# Patient Record
Sex: FEMALE | Race: WHITE | NOT HISPANIC OR LATINO | Employment: OTHER | URBAN - METROPOLITAN AREA
[De-identification: names, ages, dates, MRNs, and addresses within clinical notes are randomized per-mention and may not be internally consistent; named-entity substitution may affect disease eponyms.]

---

## 2017-02-27 RX ORDER — SERTRALINE HYDROCHLORIDE 100 MG/1
TABLET, FILM COATED ORAL
Qty: 180 TABLET | Refills: 0 | Status: SHIPPED | OUTPATIENT
Start: 2017-02-27 | End: 2017-06-03 | Stop reason: SDUPTHER

## 2017-02-27 NOTE — TELEPHONE ENCOUNTER
----- Message from Samantha Aldo sent at 2/27/2017  2:26 PM CST -----  Contact: patient  Patient calling in regards to requesting a refill for Sertraline (Zoloft). She is completely out.  Call back . Please advise.  Thanks!  [address] [place] - [address]  [address]  [place] [address]  Phone: [phone] Fax: [phone]

## 2017-03-31 DIAGNOSIS — Z12.39 BREAST CANCER SCREENING: Primary | ICD-10-CM

## 2017-06-06 RX ORDER — SERTRALINE HYDROCHLORIDE 100 MG/1
TABLET, FILM COATED ORAL
Qty: 180 TABLET | Refills: 0 | Status: SHIPPED | OUTPATIENT
Start: 2017-06-06 | End: 2017-09-08 | Stop reason: SDUPTHER

## 2017-06-07 RX ORDER — SERTRALINE HYDROCHLORIDE 100 MG/1
TABLET, FILM COATED ORAL
Qty: 180 TABLET | Refills: 0 | OUTPATIENT
Start: 2017-06-07

## 2017-09-08 RX ORDER — SERTRALINE HYDROCHLORIDE 100 MG/1
TABLET, FILM COATED ORAL
Qty: 120 TABLET | Refills: 0 | Status: SHIPPED | OUTPATIENT
Start: 2017-09-08 | End: 2017-10-03 | Stop reason: SDUPTHER

## 2017-09-08 NOTE — TELEPHONE ENCOUNTER
I have refilled Rx x 2 months. She needs to f/u with  upon her rtc, as it has been > 1 year since LOV.

## 2017-09-08 NOTE — TELEPHONE ENCOUNTER
----- Message from Chantal Ring sent at 9/8/2017 11:35 AM CDT -----  Contact: self  Patient needs a refill on Sertraline 100 mg 180 pills 90 day supply called into [facility] pharmacy at [phone].  Please call patient at [phone] if you have any questions. Thanks!     [address] [place] - [address] [facility] [address]  [address] [facility]  [place] [address]  Phone: [phone] Fax: [phone]

## 2017-09-08 NOTE — TELEPHONE ENCOUNTER
Spoke w/ pt. Verified pharmacy and allergies. Please review!! Thank you!    Last seen on 8/26/2016.    Last lab set done on 8/19/2016.    Last refill was 6/6/2017 w/ 180 tabs no refills by dr. Adam.    Pt was informed that she may need an appointment prior to refill and she stated that she was fine and did not feel that she needed to be seen by if she does she only wants to be seen my dr. Adam upon return.

## 2017-09-19 ENCOUNTER — PATIENT OUTREACH (OUTPATIENT)
Dept: ADMINISTRATIVE | Facility: HOSPITAL | Age: 72
End: 2017-09-19

## 2017-09-19 NOTE — LETTER
September 19, 2017    Zara Ann  [address] [place] [address]             [facility]  [address]  [facility] [address]  Phone: [phone] Dear Mrs. Ann:    [facility] is committed to your overall health.  To help you get the most out of each of your visits, we will review your information to make sure you are up to date on all of your recommended tests and/or procedures.      Magdalena Shrestha [facility] has found that you may be due for a mammogram and a flu immunization.     Medicare does not cover all immunizations to be given in the clinic.  Check your benefits to ensure that you do not need to receive your immunizations at the pharmacy.    If you have had any of the above done at another facility, please bring the records or information with you so that your record at [facility] will be complete.  If you would like to schedule any of these, please contact me.    If you are currently taking medication, please bring it with you to your appointment for review.    Also, if you have any type of Advanced Directives, please bring them with you to your office visit so we may scan them into your chart.    If you have any questions or concerns, please don't hesitate to call.    Thank you for letting us care for you,  Danae Fry LPN Clinical Care Coordinator  [facility]  [phone]

## 2017-10-03 ENCOUNTER — OFFICE VISIT (OUTPATIENT)
Dept: FAMILY MEDICINE | Facility: CLINIC | Age: 72
End: 2017-10-03
Payer: MEDICARE

## 2017-10-03 VITALS
SYSTOLIC BLOOD PRESSURE: 130 MMHG | HEART RATE: 69 BPM | WEIGHT: 155.13 LBS | OXYGEN SATURATION: 98 % | BODY MASS INDEX: 28.55 KG/M2 | DIASTOLIC BLOOD PRESSURE: 75 MMHG | TEMPERATURE: 98 F | HEIGHT: 62 IN

## 2017-10-03 DIAGNOSIS — K51.00 ULCERATIVE PANCOLITIS WITHOUT COMPLICATION: ICD-10-CM

## 2017-10-03 DIAGNOSIS — F33.40 RECURRENT MAJOR DEPRESSIVE DISORDER, IN REMISSION: ICD-10-CM

## 2017-10-03 DIAGNOSIS — R09.89 CAROTID BRUIT, UNSPECIFIED LATERALITY: ICD-10-CM

## 2017-10-03 DIAGNOSIS — Z00.00 GENERAL MEDICAL EXAM: Primary | ICD-10-CM

## 2017-10-03 DIAGNOSIS — J45.20 MILD INTERMITTENT REACTIVE AIRWAY DISEASE WITHOUT COMPLICATION: ICD-10-CM

## 2017-10-03 DIAGNOSIS — K76.0 FATTY LIVER: ICD-10-CM

## 2017-10-03 DIAGNOSIS — M85.80 OSTEOPENIA, UNSPECIFIED LOCATION: ICD-10-CM

## 2017-10-03 PROCEDURE — 99499 UNLISTED E&M SERVICE: CPT | Mod: S$GLB,,, | Performed by: NURSE PRACTITIONER

## 2017-10-03 PROCEDURE — 99397 PER PM REEVAL EST PAT 65+ YR: CPT | Mod: S$GLB,,, | Performed by: NURSE PRACTITIONER

## 2017-10-03 PROCEDURE — 99999 PR PBB SHADOW E&M-EST. PATIENT-LVL V: CPT | Mod: PBBFAC,,, | Performed by: NURSE PRACTITIONER

## 2017-10-03 RX ORDER — SERTRALINE HYDROCHLORIDE 100 MG/1
TABLET, FILM COATED ORAL
Qty: 120 TABLET | Refills: 0 | Status: SHIPPED | OUTPATIENT
Start: 2017-10-03 | End: 2017-12-17 | Stop reason: SDUPTHER

## 2017-10-03 RX ORDER — NAPROXEN 500 MG/1
TABLET ORAL
COMMUNITY
Start: 2017-08-19 | End: 2018-06-13

## 2017-10-03 NOTE — PROGRESS NOTES
Subjective:       Patient ID: Zara Ann is a 72 y.o. female.    Chief Complaint: Annual Exam    HPI     Patient presents to clinic for an annual exam.   Reports that she slipped and fell while visiting [place]. R leg pain has improved. Xrays were negative.   Recurrent depression - maintained on Zoloft for several years with relief of sx. Colonoscopy was last update in 2016 with Dr. Dunham. She will be due to repeat in 2 years.   She is not interested in a mammogram. She would prefer to obtain a flu shot at her local pharmacy.     Review of Systems   Constitutional: Negative for chills and fever.   Respiratory: Negative for cough, shortness of breath and wheezing.    Cardiovascular: Negative for chest pain, palpitations and leg swelling.   Gastrointestinal: Negative for constipation, diarrhea, nausea and vomiting.   Genitourinary: Negative for difficulty urinating, dysuria, frequency, hematuria and urgency.   Skin: Negative for rash and wound.   Neurological: Positive for headaches (occasional ). Negative for dizziness and light-headedness.   Psychiatric/Behavioral: Positive for dysphoric mood (relates to marital stress and recent death of her sister. ) and sleep disturbance. The patient is not nervous/anxious.        Objective:      Physical Exam   Constitutional: She is oriented to person, place, and time. She appears well-developed and well-nourished.   HENT:   Head: Normocephalic and atraumatic.   Right Ear: External ear normal.   Left Ear: External ear normal.   Nose: Nose normal.   Mouth/Throat: Oropharynx is clear and moist.   Eyes: Pupils are equal, round, and reactive to light. Right eye exhibits no discharge. Left eye exhibits no discharge.   Neck: Normal range of motion. Neck supple. Carotid bruit is present. No thyromegaly present.   Cardiovascular: Normal rate, regular rhythm, normal heart sounds and intact distal pulses.    No murmur heard.  Pulmonary/Chest: Effort normal and breath sounds  normal. No respiratory distress. She has no wheezes. She has no rales.   Abdominal: Soft. Bowel sounds are normal. She exhibits no distension and no mass. There is no tenderness. There is no guarding.   Musculoskeletal: Normal range of motion. She exhibits no edema, tenderness or deformity.   Lymphadenopathy:     She has no cervical adenopathy.   Neurological: She is alert and oriented to person, place, and time. No cranial nerve deficit.   Skin: Skin is warm and dry. Capillary refill takes less than 2 seconds.   Psychiatric: She has a normal mood and affect. Her behavior is normal.   Nursing note and vitals reviewed.      Assessment:       1. General medical exam    2. Recurrent major depressive disorder, in remission    3. Ulcerative pancolitis without complication    4. Mild intermittent reactive airway disease without complication    5. Osteopenia, unspecified location    6. Fatty liver    7. Carotid bruit, unspecified laterality        Plan:   Zara was seen today for annual exam.    Diagnoses and all orders for this visit:    General medical exam  -     Comprehensive metabolic panel; Future  -     Lipid panel; Future  -     CBC auto differential; Future  -     TSH; Future  -     Hemoglobin A1c; Future  -     Vitamin D; Future  Update labs. Anticipatory guidance reviewed.   Update colonoscopy 2018.    Recurrent major depressive disorder, in remission  -     sertraline (ZOLOFT) 100 MG tablet; TAKE ONE TABLET BY MOUTH TWICE DAILY  Side effects and precautions of medication use reviewed with patient, expressed understanding. No questions or concerns.  Self-care, sx monitoring, and counseling reviewed. Patient receptive to discussion.   Will place referral for therapy due to recent losses.     Ulcerative pancolitis without complication  Stable. Repeat colonoscopy in 2018.    Mild intermittent reactive airway disease without complication  Stable. Continue current regimen.     Osteopenia, unspecified  location  Stable. Continue current regimen.     Fatty liver  Stable. Continue current regimen.     Carotid bruit, unspecified laterality  -     US Carotid Bilateral; Future    .

## 2017-10-12 ENCOUNTER — HOSPITAL ENCOUNTER (OUTPATIENT)
Dept: RADIOLOGY | Facility: HOSPITAL | Age: 72
Discharge: HOME | End: 2017-10-12
Attending: NURSE PRACTITIONER
Payer: MEDICARE

## 2017-10-12 DIAGNOSIS — R09.89 CAROTID BRUIT, UNSPECIFIED LATERALITY: ICD-10-CM

## 2017-10-12 PROCEDURE — 93880 EXTRACRANIAL BILAT STUDY: CPT | Mod: 26,,, | Performed by: RADIOLOGY

## 2017-10-12 PROCEDURE — 93880 EXTRACRANIAL BILAT STUDY: CPT | Mod: TC,PO

## 2017-10-19 ENCOUNTER — TELEPHONE (OUTPATIENT)
Dept: FAMILY MEDICINE | Facility: CLINIC | Age: 72
End: 2017-10-19

## 2017-10-19 NOTE — TELEPHONE ENCOUNTER
----- Message from Mohsen Merlos sent at 10/19/2017  9:17 AM CDT -----  Contact: pt   Pt is calling to see if she can get her Lab Orders faxed over to [facility] in [place]  Call Back#[phone]   Thanks

## 2017-10-27 LAB
1,25(OH)2D SERPL-MCNC: 33 PG/ML (ref 18–72)
1,25(OH)2D2 SERPL-MCNC: <8 PG/ML
1,25(OH)2D3 SERPL-MCNC: 33 PG/ML
ALBUMIN SERPL-MCNC: 4.7 G/DL (ref 3.6–5.1)
ALBUMIN/GLOB SERPL: 1.7 (CALC) (ref 1–2.5)
ALP SERPL-CCNC: 72 U/L (ref 33–130)
ALT SERPL-CCNC: 17 U/L (ref 6–29)
AST SERPL-CCNC: 24 U/L (ref 10–35)
BASOPHILS # BLD AUTO: 18 CELLS/UL (ref 0–200)
BASOPHILS NFR BLD AUTO: 0.4 %
BILIRUB SERPL-MCNC: 0.8 MG/DL (ref 0.2–1.2)
BUN SERPL-MCNC: 18 MG/DL (ref 7–25)
BUN/CREAT SERPL: NORMAL (CALC) (ref 6–22)
CALCIUM SERPL-MCNC: 9.7 MG/DL (ref 8.6–10.4)
CHLORIDE SERPL-SCNC: 104 MMOL/L (ref 98–110)
CHOLEST SERPL-MCNC: 211 MG/DL
CHOLEST/HDLC SERPL: 3.8 (CALC)
CO2 SERPL-SCNC: 30 MMOL/L (ref 20–31)
COPY(IES) SENT TO:: NORMAL
CREAT SERPL-MCNC: 0.83 MG/DL (ref 0.6–0.93)
EOSINOPHIL # BLD AUTO: 149 CELLS/UL (ref 15–500)
EOSINOPHIL NFR BLD AUTO: 3.3 %
ERYTHROCYTE [DISTWIDTH] IN BLOOD BY AUTOMATED COUNT: 13.4 % (ref 11–15)
GFR SERPL CREATININE-BSD FRML MDRD: 71 ML/MIN/1.73M2
GLOBULIN SER CALC-MCNC: 2.8 G/DL (CALC) (ref 1.9–3.7)
GLUCOSE SERPL-MCNC: 97 MG/DL (ref 65–99)
HBA1C MFR BLD: 4.5 % OF TOTAL HGB
HCT VFR BLD AUTO: 39.1 % (ref 35–45)
HDLC SERPL-MCNC: 56 MG/DL
HGB BLD-MCNC: 13.4 G/DL (ref 11.7–15.5)
LDLC SERPL CALC-MCNC: 135 MG/DL (CALC)
LYMPHOCYTES # BLD AUTO: 842 CELLS/UL (ref 850–3900)
LYMPHOCYTES NFR BLD AUTO: 18.7 %
MCH RBC QN AUTO: 30.9 PG (ref 27–33)
MCHC RBC AUTO-ENTMCNC: 34.3 G/DL (ref 32–36)
MCV RBC AUTO: 90.3 FL (ref 80–100)
MONOCYTES # BLD AUTO: 405 CELLS/UL (ref 200–950)
MONOCYTES NFR BLD AUTO: 9 %
NEUTROPHILS # BLD AUTO: 3087 CELLS/UL (ref 1500–7800)
NEUTROPHILS NFR BLD AUTO: 68.6 %
NONHDLC SERPL-MCNC: 155 MG/DL (CALC)
PLATELET # BLD AUTO: 154 THOUSAND/UL (ref 140–400)
PMV BLD REES-ECKER: 9.2 FL (ref 7.5–12.5)
POTASSIUM SERPL-SCNC: 4.4 MMOL/L (ref 3.5–5.3)
PROT SERPL-MCNC: 7.5 G/DL (ref 6.1–8.1)
RBC # BLD AUTO: 4.33 MILLION/UL (ref 3.8–5.1)
SODIUM SERPL-SCNC: 143 MMOL/L (ref 135–146)
TRIGL SERPL-MCNC: 96 MG/DL
TSH SERPL-ACNC: 2.04 MIU/L (ref 0.4–4.5)
WBC # BLD AUTO: 4.5 THOUSAND/UL (ref 3.8–10.8)

## 2017-11-21 ENCOUNTER — TELEPHONE (OUTPATIENT)
Dept: FAMILY MEDICINE | Facility: CLINIC | Age: 72
End: 2017-11-21

## 2017-11-21 NOTE — TELEPHONE ENCOUNTER
----- Message from Wilfrido Poe sent at 11/21/2017 10:38 AM CST -----  Contact: patient  Patient called requesting labs be faxed to Dr. Angel [phone]. If any questions call back at [phone]. Thanks,

## 2017-12-17 DIAGNOSIS — F33.40 RECURRENT MAJOR DEPRESSIVE DISORDER, IN REMISSION: ICD-10-CM

## 2017-12-18 RX ORDER — SERTRALINE HYDROCHLORIDE 100 MG/1
TABLET, FILM COATED ORAL
Qty: 120 TABLET | Refills: 0 | Status: SHIPPED | OUTPATIENT
Start: 2017-12-18 | End: 2018-06-13 | Stop reason: SDUPTHER

## 2018-01-26 ENCOUNTER — TELEPHONE (OUTPATIENT)
Dept: ADMINISTRATIVE | Facility: HOSPITAL | Age: 73
End: 2018-01-26

## 2018-03-20 RX ORDER — SERTRALINE HYDROCHLORIDE 100 MG/1
TABLET, FILM COATED ORAL
Qty: 180 TABLET | Refills: 1 | Status: SHIPPED | OUTPATIENT
Start: 2018-03-20 | End: 2018-09-25 | Stop reason: SDUPTHER

## 2018-05-24 ENCOUNTER — TELEPHONE (OUTPATIENT)
Dept: FAMILY MEDICINE | Facility: CLINIC | Age: 73
End: 2018-05-24

## 2018-05-24 DIAGNOSIS — R30.0 DYSURIA: Primary | ICD-10-CM

## 2018-05-24 NOTE — TELEPHONE ENCOUNTER
----- Message from Thomas Fry sent at 5/24/2018 10:24 AM CDT -----  Contact: Patient  Type: Needs Medical Advice    Who Called:  Zara  Symptoms (please be specific):  UTI  How long has patient had these symptoms:  About four days now  Pharmacy name and phone #:  [facility] Phone: [phone] Fax: [phone]  Best Call Back Number: [phone]  Additional Information:  Would like to know if she can just come in for a urine sample, instead of getting an appointment.

## 2018-05-24 NOTE — TELEPHONE ENCOUNTER
Urine orders done. Please ask her to be seen in UC or [facility] for full eval as well given bleeding and fever.

## 2018-05-24 NOTE — TELEPHONE ENCOUNTER
----- Message from Kiki Blount sent at 5/24/2018  4:13 PM CDT -----  Contact: Pt  Type:  Patient Returning Call    Who Called: Joanne  Who Left Message for Patient: Racheal  Does the patient know what this is regarding?: results for UTI  Best Call Back Number: .[phone] (home)  Additional Information:

## 2018-05-24 NOTE — TELEPHONE ENCOUNTER
"Pt c/o dysuria x 4-5 days, she states she has seen blood after wiping on the tissue in the beginning but no longer. She is taking OTC cranberry pills and drinking cranberry juice.  She c/o low grade fever, no chills but states she "just does not feel good".  Pt dropped off urine sample to  and is requesting urinalysis.  Please advise.      "

## 2018-05-24 NOTE — TELEPHONE ENCOUNTER
----- Message from Juan Riley sent at 5/24/2018  3:54 PM CDT -----  Contact: same  Patient called in and stated she dropped off a urine sample this morning and wanted to see if results were in and when the Rx would be called in for her??      [address] [place] - [address]  [address]  [place] [address]  Phone: [phone] Fax: [phone]    Patient call back number is [phone] or cell number is [phone]

## 2018-05-25 ENCOUNTER — LAB VISIT (OUTPATIENT)
Dept: LAB | Facility: HOSPITAL | Age: 73
End: 2018-05-25
Attending: FAMILY MEDICINE
Payer: MEDICARE

## 2018-05-25 DIAGNOSIS — R30.0 DYSURIA: ICD-10-CM

## 2018-05-25 LAB
BACTERIA #/AREA URNS AUTO: ABNORMAL /HPF
BILIRUB UR QL STRIP: NEGATIVE
CAOX CRY UR QL COMP ASSIST: ABNORMAL
CLARITY UR REFRACT.AUTO: ABNORMAL
COLOR UR AUTO: YELLOW
GLUCOSE UR QL STRIP: NEGATIVE
HGB UR QL STRIP: NEGATIVE
KETONES UR QL STRIP: NEGATIVE
LEUKOCYTE ESTERASE UR QL STRIP: ABNORMAL
MICROSCOPIC COMMENT: ABNORMAL
NITRITE UR QL STRIP: NEGATIVE
PH UR STRIP: 7 [PH] (ref 5–8)
PROT UR QL STRIP: NEGATIVE
RBC #/AREA URNS AUTO: 3 /HPF (ref 0–4)
SP GR UR STRIP: 1.02 (ref 1–1.03)
URN SPEC COLLECT METH UR: ABNORMAL
UROBILINOGEN UR STRIP-ACNC: NEGATIVE EU/DL
WBC #/AREA URNS AUTO: >100 /HPF (ref 0–5)
WBC CLUMPS UR QL AUTO: ABNORMAL

## 2018-05-25 PROCEDURE — 87088 URINE BACTERIA CULTURE: CPT

## 2018-05-25 PROCEDURE — 87086 URINE CULTURE/COLONY COUNT: CPT

## 2018-05-25 PROCEDURE — 81001 URINALYSIS AUTO W/SCOPE: CPT

## 2018-05-25 RX ORDER — CIPROFLOXACIN 500 MG/1
500 TABLET ORAL 2 TIMES DAILY
Qty: 10 TABLET | Refills: 0 | Status: SHIPPED | OUTPATIENT
Start: 2018-05-25 | End: 2018-06-13

## 2018-05-25 RX ORDER — MESALAMINE 800 MG/1
800 TABLET, DELAYED RELEASE ORAL 2 TIMES DAILY
COMMUNITY
End: 2020-07-22

## 2018-05-25 NOTE — TELEPHONE ENCOUNTER
ua suggestive of UTI. cx will take 72hrs. Given her extensive allergy list, we can start her on cipro over the weekend in tx. If cx indicates cipro will be ineffective, we'll call her to discuss.  Increase water intake. Needs to be seen over the weekend if worsening or febrile.

## 2018-05-27 LAB — BACTERIA UR CULT: NORMAL

## 2018-05-28 ENCOUNTER — TELEPHONE (OUTPATIENT)
Dept: FAMILY MEDICINE | Facility: CLINIC | Age: 73
End: 2018-05-28

## 2018-05-28 RX ORDER — CLINDAMYCIN HYDROCHLORIDE 150 MG/1
150 CAPSULE ORAL 3 TIMES DAILY
Qty: 21 CAPSULE | Refills: 0 | Status: SHIPPED | OUTPATIENT
Start: 2018-05-28 | End: 2018-06-13 | Stop reason: ALTCHOICE

## 2018-05-28 NOTE — TELEPHONE ENCOUNTER
She was started on Cipro on Friday. Find out how her symptoms are doing. If not improved, we will change there antibiotics

## 2018-05-28 NOTE — TELEPHONE ENCOUNTER
----- Message from Claudette Agarwal sent at 5/28/2018 11:56 AM CDT -----  Type:  Test Results    Who Called:  patient  Name of Test (Lab/Mammo/Etc): urine  Date of Test:  5/25/18  Ordering Provider:  Elder  Where the test was performed:  [facility]  Best Call Back Number:  [phone] (home)     Additional Information:  Na

## 2018-05-28 NOTE — TELEPHONE ENCOUNTER
Pt states that burning during urination is gone. However pt states she is weak and has pretty much sleeping the last 3 days. Pt would like advise on how to proceed.    Review of patient's allergies indicates:  Allergen Reactions   Macrobid [nitrofurantoin monohyd/m-cryst] Shortness Of Breath   Pcn [penicillins] Hives   Sulfa (sulfonamide antibiotics) Hives   Cephalosporins    Hydrocod-cpm-pe-acetaminophen    Hydrocortisone Nausea Only      Pt wanted provider to be aware of allergies again.

## 2018-06-11 ENCOUNTER — TELEPHONE (OUTPATIENT)
Dept: FAMILY MEDICINE | Facility: CLINIC | Age: 73
End: 2018-06-11

## 2018-06-11 NOTE — TELEPHONE ENCOUNTER
----- Message from Shani Nava sent at 6/11/2018 11:21 AM CDT -----  Type:  Sooner Apoointment Request    Caller is requesting a sooner appointment.  Caller declined first available appointment listed below.  Caller will not accept being placed on the waitlist and is requesting a message be sent to doctor.    Name of Caller:  Patient  When is the first available appointment?  7/19/18  Symptoms:  Fingers on both hands feeling numb, pins and needles  Best Call Back Number:  [phone]  Additional Information:  Patient is scared and would like to come in today. Please call back to advice.

## 2018-06-11 NOTE — TELEPHONE ENCOUNTER
Pt states the fingers in both hand went numb at 5 am this morning. Pt states its only her fingers. Pt was advised to go to  pt declined. Offered pt 1st opening on 6-, pt was wondering if she needed to be seen sooner. Pt wants a message sent to provider.

## 2018-06-13 ENCOUNTER — HOSPITAL ENCOUNTER (OUTPATIENT)
Dept: RADIOLOGY | Facility: HOSPITAL | Age: 73
Discharge: HOME | End: 2018-06-13
Attending: FAMILY MEDICINE
Payer: MEDICARE

## 2018-06-13 ENCOUNTER — OFFICE VISIT (OUTPATIENT)
Dept: FAMILY MEDICINE | Facility: CLINIC | Age: 73
End: 2018-06-13
Payer: MEDICARE

## 2018-06-13 VITALS
TEMPERATURE: 98 F | SYSTOLIC BLOOD PRESSURE: 100 MMHG | OXYGEN SATURATION: 97 % | DIASTOLIC BLOOD PRESSURE: 70 MMHG | HEIGHT: 62 IN | WEIGHT: 157.31 LBS | BODY MASS INDEX: 28.95 KG/M2 | HEART RATE: 76 BPM

## 2018-06-13 DIAGNOSIS — G62.9 NEUROPATHY: ICD-10-CM

## 2018-06-13 DIAGNOSIS — F33.40 RECURRENT MAJOR DEPRESSIVE DISORDER, IN REMISSION: ICD-10-CM

## 2018-06-13 DIAGNOSIS — Z12.39 SCREENING BREAST EXAMINATION: ICD-10-CM

## 2018-06-13 DIAGNOSIS — Z78.0 POSTMENOPAUSAL STATE: ICD-10-CM

## 2018-06-13 DIAGNOSIS — K76.0 FATTY LIVER: ICD-10-CM

## 2018-06-13 DIAGNOSIS — R73.9 HYPERGLYCEMIA: ICD-10-CM

## 2018-06-13 DIAGNOSIS — R20.0 NUMBNESS IN BOTH HANDS: ICD-10-CM

## 2018-06-13 DIAGNOSIS — M54.2 NECK PAIN: ICD-10-CM

## 2018-06-13 DIAGNOSIS — F33.1 MODERATE EPISODE OF RECURRENT MAJOR DEPRESSIVE DISORDER: ICD-10-CM

## 2018-06-13 DIAGNOSIS — D48.9 NEOPLASM, UNCERTAIN WHETHER BENIGN OR MALIGNANT: Primary | ICD-10-CM

## 2018-06-13 DIAGNOSIS — M10.9 GOUT, UNSPECIFIED CAUSE, UNSPECIFIED CHRONICITY, UNSPECIFIED SITE: ICD-10-CM

## 2018-06-13 PROCEDURE — 99214 OFFICE O/P EST MOD 30 MIN: CPT | Mod: S$GLB,,, | Performed by: FAMILY MEDICINE

## 2018-06-13 PROCEDURE — 72040 X-RAY EXAM NECK SPINE 2-3 VW: CPT | Mod: TC,PN

## 2018-06-13 PROCEDURE — 72040 X-RAY EXAM NECK SPINE 2-3 VW: CPT | Mod: 26,,, | Performed by: RADIOLOGY

## 2018-06-13 PROCEDURE — 77067 SCR MAMMO BI INCL CAD: CPT | Mod: TC,PO

## 2018-06-13 PROCEDURE — 77080 DXA BONE DENSITY AXIAL: CPT | Mod: 26,,, | Performed by: RADIOLOGY

## 2018-06-13 PROCEDURE — 99499 UNLISTED E&M SERVICE: CPT | Mod: S$GLB,,, | Performed by: FAMILY MEDICINE

## 2018-06-13 PROCEDURE — 99999 PR PBB SHADOW E&M-EST. PATIENT-LVL V: CPT | Mod: PBBFAC,,, | Performed by: FAMILY MEDICINE

## 2018-06-13 PROCEDURE — 77080 DXA BONE DENSITY AXIAL: CPT | Mod: TC,PO

## 2018-06-13 PROCEDURE — 77067 SCR MAMMO BI INCL CAD: CPT | Mod: 26,,, | Performed by: RADIOLOGY

## 2018-06-13 PROCEDURE — 77063 BREAST TOMOSYNTHESIS BI: CPT | Mod: 26,,, | Performed by: RADIOLOGY

## 2018-06-13 RX ORDER — GABAPENTIN 300 MG/1
300 CAPSULE ORAL NIGHTLY
Qty: 30 CAPSULE | Refills: 1 | Status: SHIPPED | OUTPATIENT
Start: 2018-06-13 | End: 2019-02-11 | Stop reason: SDUPTHER

## 2018-06-13 RX ORDER — ASPIRIN 81 MG/1
81 TABLET ORAL NIGHTLY
Status: ON HOLD | COMMUNITY
End: 2018-10-24 | Stop reason: SDUPTHER

## 2018-06-13 NOTE — PROGRESS NOTES
Subjective:       Patient ID: Zara Ann is a 73 y.o. female.    Chief Complaint: Numbness (both hands)    HPI  Patient in the office to review numbness in both hands.  Recalls onset 3 days ago in the early morning. R hand in particular was completely numb for about a day. Improved over the course of the next 24hrs starting with pins and needles. It recurred the next morning again with numbness. This morning, to note, her hands were not numb. No weakness. She was still able to hold a pen and write.    Incidentally, had completed course of cipro prior to onset.    Also, notes 20 years ago, saw neurology for difficulty raising her arms above her head. States that imaging showed cervical spine issues and did exercises thru [facility] to resolve/improve. She does not recall any prior dx of neuropathy.    Also, a mole on her L arm that is new, increasing in size.  Also, soles of feet are numb/change in sensation x over 1 year.     Bladder sx are resolved. Reports dyspnea while on cipro and clinda which has since resolved.    Review of Systems   Constitutional: Negative for chills, fever and unexpected weight change.   HENT: Negative for congestion and postnasal drip.    Respiratory: Negative for cough and shortness of breath.    Cardiovascular: Negative for chest pain, palpitations and leg swelling.   Gastrointestinal: Negative for constipation, diarrhea and nausea.   Genitourinary: Negative for difficulty urinating, dysuria and hematuria.        Uti resolved   Musculoskeletal: Negative for back pain, gait problem and joint swelling.   Skin: Negative for color change and rash.   Neurological: Positive for numbness. Negative for dizziness, facial asymmetry, speech difficulty, weakness and headaches.   Psychiatric/Behavioral: Negative for sleep disturbance. The patient is not nervous/anxious.        Objective:      Physical Exam   Constitutional: She is oriented to person, place, and time. She appears well-developed  and well-nourished. No distress.   HENT:   Head: Normocephalic and atraumatic.   Eyes: Conjunctivae are normal. Right eye exhibits no discharge. Left eye exhibits no discharge. No scleral icterus.   Neck: Normal range of motion. Neck supple.   Cardiovascular: Normal rate and regular rhythm.    Pulmonary/Chest: Effort normal and breath sounds normal. No respiratory distress.   Abdominal: Soft. She exhibits no distension.   Musculoskeletal: Normal range of motion. She exhibits no edema.   Tinel, phalens neg.   Neurological: She is alert and oriented to person, place, and time. She has normal strength. A sensory deficit (great toe, bilateral; hands intact) is present. No cranial nerve deficit.   Skin: Skin is warm and dry. No rash noted.        Psychiatric: Her behavior is normal. She exhibits a depressed mood (having marital issues).   occ scattered thought process   Nursing note and vitals reviewed.        Neoplasm, uncertain whether benign or malignant  -     Ambulatory referral to Dermatology  For review with derm.  Neck pain  -     X-Ray Cervical Spine AP And Lateral; Future; Expected date: 06/13/2018  -     MRI Cervical Spine Without Contrast; Future; Expected date: 06/13/2018  -     Ambulatory referral to Neurology  -     gabapentin (NEURONTIN) 300 MG capsule; Take 1 capsule (300 mg total) by mouth every evening.  Dispense: 30 capsule; Refill: 1  CTS vs cervical stenosis. Update imaging and f/u in neuro. Can trial gabapentin for sx relief.   Numbness in both hands  As above.    Neuropathy  Pedal neuropathic change. Will need to consider emgs for assessment in neuro. A1c last checked 10/2017 and wnl.  Recurrent major depressive disorder, in remission  Uncontrolled of late due to personal stressors. On max dose of zoloft. Relatively med avoidant. Recommend counseling, which pt agrees with.  Fatty liver  Noted previously, will monitor.  Gout, unspecified cause, unspecified chronicity, unspecified site  Update uric  acid level.  Hyperglycemia  -     Hemoglobin A1c; Future; Expected date: 06/13/2018  Update A1c for monitoring. States nonfasting BS at home in excess of 170 on 's glucometer.  Moderate episode of recurrent major depressive disorder  -     Ambulatory referral to Psychology  As above.

## 2018-06-26 ENCOUNTER — HOSPITAL ENCOUNTER (OUTPATIENT)
Dept: RADIOLOGY | Facility: HOSPITAL | Age: 73
Discharge: HOME | End: 2018-06-26
Attending: FAMILY MEDICINE
Payer: MEDICARE

## 2018-06-26 DIAGNOSIS — R20.0 NUMBNESS IN BOTH HANDS: ICD-10-CM

## 2018-06-26 DIAGNOSIS — M54.2 NECK PAIN: ICD-10-CM

## 2018-06-26 DIAGNOSIS — G62.9 NEUROPATHY: ICD-10-CM

## 2018-06-26 PROCEDURE — 72141 MRI NECK SPINE W/O DYE: CPT | Mod: TC,PO

## 2018-06-26 PROCEDURE — 72141 MRI NECK SPINE W/O DYE: CPT | Mod: 26,,, | Performed by: RADIOLOGY

## 2018-06-28 ENCOUNTER — TELEPHONE (OUTPATIENT)
Dept: FAMILY MEDICINE | Facility: CLINIC | Age: 73
End: 2018-06-28

## 2018-06-28 DIAGNOSIS — M50.00 CERVICAL DISC DISEASE WITH MYELOPATHY: Primary | ICD-10-CM

## 2018-06-28 NOTE — TELEPHONE ENCOUNTER
Spoke w/ pt. Informed pt about results and recommendations per provider. pt verbalized understanding.    appt on 8-.

## 2018-06-28 NOTE — TELEPHONE ENCOUNTER
Multilevel degenerative changes, disc issues, narrowing noted on exam. Recommend she schedule an appt with Dr Gomez to discuss injections or similar that may be of sx benefit. Referral done.   A copy of her result was also sent to their office.

## 2018-06-28 NOTE — TELEPHONE ENCOUNTER
----- Message from Juan Riley sent at 6/28/2018  3:52 PM CDT -----  Contact: same  Type:  Test Results    Who Called:  patient  Name of Test (Lab/Mammo/Etc):  MRI  Date of Test:  6/26/18  Ordering Provider:  Elder  Where the test was performed:  [address] [facility] [address]  Best Call Back Number:  [phone]  Additional Information:  n/a

## 2018-06-28 NOTE — TELEPHONE ENCOUNTER
Tried to reach pt. No answer, left msg to call back.    Contacting to inform pt of results and recommendations.

## 2018-07-16 ENCOUNTER — TELEPHONE (OUTPATIENT)
Dept: FAMILY MEDICINE | Facility: CLINIC | Age: 73
End: 2018-07-16

## 2018-07-16 ENCOUNTER — TELEPHONE (OUTPATIENT)
Dept: NEUROLOGY | Facility: CLINIC | Age: 73
End: 2018-07-16

## 2018-07-16 ENCOUNTER — OFFICE VISIT (OUTPATIENT)
Dept: FAMILY MEDICINE | Facility: CLINIC | Age: 73
End: 2018-07-16
Payer: MEDICARE

## 2018-07-16 VITALS
OXYGEN SATURATION: 96 % | WEIGHT: 159 LBS | RESPIRATION RATE: 16 BRPM | BODY MASS INDEX: 29.26 KG/M2 | TEMPERATURE: 98 F | SYSTOLIC BLOOD PRESSURE: 116 MMHG | DIASTOLIC BLOOD PRESSURE: 82 MMHG | HEIGHT: 62 IN | HEART RATE: 68 BPM

## 2018-07-16 DIAGNOSIS — B02.31 HERPES ZOSTER CONJUNCTIVITIS: Primary | ICD-10-CM

## 2018-07-16 PROCEDURE — 99214 OFFICE O/P EST MOD 30 MIN: CPT | Mod: S$GLB,,, | Performed by: FAMILY MEDICINE

## 2018-07-16 PROCEDURE — 99999 PR PBB SHADOW E&M-EST. PATIENT-LVL IV: CPT | Mod: PBBFAC,,, | Performed by: FAMILY MEDICINE

## 2018-07-16 RX ORDER — PREDNISONE 20 MG/1
20 TABLET ORAL DAILY
Qty: 5 TABLET | Refills: 0 | Status: SHIPPED | OUTPATIENT
Start: 2018-07-16 | End: 2018-07-26

## 2018-07-16 RX ORDER — VALACYCLOVIR HYDROCHLORIDE 1 G/1
1000 TABLET, FILM COATED ORAL 3 TIMES DAILY
Qty: 21 TABLET | Refills: 0 | Status: SHIPPED | OUTPATIENT
Start: 2018-07-16 | End: 2018-07-25 | Stop reason: SDUPTHER

## 2018-07-16 NOTE — TELEPHONE ENCOUNTER
----- Message from Rissa Whitt sent at 7/16/2018 11:46 AM CDT -----  Contact: Aneta doss/ Gabriele doss/ Jessica calling to speak to the Nurse regarding prescription for predniSONE (DELTASONE) 20 MG tablet. They are needing to verify quantity and directions. Please advise. Call to pod. No answer.   Call back   Thanks!     [facility] [address] [place]  [address]  [address]  [place] [address]  Phone: [phone] Fax: [phone]

## 2018-07-16 NOTE — PROGRESS NOTES
Subjective:       Patient ID: Zara Ann is a 73 y.o. female.    Chief Complaint: Pain (pt c/o pain to left forehead and scalp)    HPI  Patient with c/o rash and burning pain to L forehead and scalp about 1 week ago. She notes there is now a rash on her eyelid and forehead. Afebrile. Vision on affected side is intact. Her L hearing was prev down, but she doesn't note any recent change.  Pain is localized to L forehead and scalp.    Review of Systems   Constitutional: Negative for fatigue and fever.   HENT: Negative for congestion and postnasal drip.    Respiratory: Negative for cough and shortness of breath.    Skin: Positive for rash. Negative for wound.   Neurological: Positive for headaches. Negative for light-headedness and numbness.       Objective:      Physical Exam   Constitutional: She is oriented to person, place, and time. She appears well-developed and well-nourished. No distress.   HENT:   Head: Normocephalic and atraumatic.       Eyes: Conjunctivae are normal. Right eye exhibits no discharge. Left eye exhibits no discharge. No scleral icterus.   Neck: Normal range of motion. Neck supple.   Cardiovascular: Normal rate.    Pulmonary/Chest: Effort normal. No respiratory distress.   Abdominal: Soft. She exhibits no distension.   Musculoskeletal: Normal range of motion. She exhibits no edema.   Neurological: She is alert and oriented to person, place, and time.   Skin: Skin is warm and dry. No rash noted.   Psychiatric: She has a normal mood and affect. Her behavior is normal.   Nursing note and vitals reviewed.        Herpes zoster conjunctivitis  -     valACYclovir (VALTREX) 1000 MG tablet; Take 1 tablet (1,000 mg total) by mouth 3 (three) times daily. for 7 days  Dispense: 21 tablet; Refill: 0  -     predniSONE (DELTASONE) 20 MG tablet; Take 1 tablet (20 mg total) by mouth once daily. for 10 days  Dispense: 5 tablet; Refill: 0  -     Ambulatory referral to Ophthalmology    Side effects and  precautions of medication use reviewed with patient, expressed understanding. No questions or concerns. Expected course of illness and sx tx incl otc med use reviewed. Notify MD if sx persist or worsen.   Pt has gabapentin at home, recommend qd-tid prn pain. Sedation risks reviewed.

## 2018-07-16 NOTE — TELEPHONE ENCOUNTER
Spoke to Aneta at [facility] pharmacy re: prednisone 20mg, clarify script 1 tab (20mg) once daily for 5 days per Dr Adam verbal confirmation.

## 2018-07-16 NOTE — TELEPHONE ENCOUNTER
Called and spoke with patient. Appointment rescheduled to a sooner date. Patient verbalized understanding.

## 2018-07-17 ENCOUNTER — OFFICE VISIT (OUTPATIENT)
Dept: OPTOMETRY | Facility: CLINIC | Age: 73
End: 2018-07-17
Payer: MEDICARE

## 2018-07-17 DIAGNOSIS — B02.39 HERPES ZOSTER DERMATITIS OF EYELID: Primary | ICD-10-CM

## 2018-07-17 PROCEDURE — 99999 PR PBB SHADOW E&M-EST. PATIENT-LVL III: CPT | Mod: PBBFAC,,, | Performed by: OPTOMETRIST

## 2018-07-17 PROCEDURE — 92004 COMPRE OPH EXAM NEW PT 1/>: CPT | Mod: S$GLB,,, | Performed by: OPTOMETRIST

## 2018-07-17 RX ORDER — ERYTHROMYCIN 5 MG/G
OINTMENT OPHTHALMIC
Qty: 3.5 G | Refills: 1 | Status: SHIPPED | OUTPATIENT
Start: 2018-07-17 | End: 2018-07-24

## 2018-07-17 NOTE — PROGRESS NOTES
HPI     Eye Problem    Additional comments: ref by Dr. Adam for shingles with lesions left side   x 1 week, started on valtrex x 1 day -- OS looks clear, no visual   complaints           Eye Pain    Additional comments: severe pain around OS w/ some discharge, tearing --   +acyclovir shanti            Comments   Agree above  Dx HZ left sided trigeminal -- this past week  Started anti viral yesterday  Reports not tolerating the neurontin due to sleepiness         Last edited by VASYL Hernández, OD on 7/17/2018  1:16 PM. (History)        ROS     Positive for: Eyes    Negative for: Constitutional, Gastrointestinal, Neurological, Skin,   Genitourinary, Musculoskeletal, HENT, Endocrine, Cardiovascular,   Respiratory, Psychiatric, Allergic/Imm, Heme/Lymph    Last edited by VASYL Hernández, OD on 7/17/2018  9:57 AM. (History)        Assessment /Plan     For exam results, see Encounter Report.    Herpes zoster dermatitis of eyelid  -     erythromycin (ROMYCIN) ophthalmic ointment; Apply sparingly to eyelid and lesion OS, tid x 7 days  Dispense: 3.5 g; Refill: 1      Mild eyelid involvement OS  No Gomez's sign  No cornea / globe involvement    IOP / DFE wnl, discussed s/s of ocular involvement, knows to call if any concerns  Rx EES shanti to eyelid skin / lesion, tid x 7 days or prn

## 2018-07-17 NOTE — PATIENT INSTRUCTIONS
"DRY EYES:  Use Over The Counter artificial tears as needed for dry eye symptoms.  Some common brands include:  Systane, Optive, and Refresh.  These drops can be used as frequently as desired, but may be most helpful use during long periods of concentrated work.  For example, reading / working at the computer.  Avoid drops that "get redness out", as these contain medication that may further irritate the eyes.    ALLERGY EYES / SYMPTOMS:    Over the counter medications include--Zaditor and Alaway  Use as directed 1-2 drops daily for symptoms of itching / watering eyes.  These drops will not help for dry eye or exposure symptoms.      Shingles  Shingles is a viral infection caused by the same virus as chicken pox. Anyone who has had chicken pox may get shingles later in life. The virus stays in the body, but remains dormant (asleep). Shingles often occurs in older persons or persons with lowered immunity. But it can affect anyone at any age.  Shingles starts as a tingling patch of skin on one side of the body. Small, painful blisters may then appear. The rash does not spread to the rest of the body.  Exposure to shingles cannot cause shingles. However, it can cause chicken pox in anyone who has not had chicken pox or has not been vaccinated. The contagious period ends when all blisters have crusted over (generally about 2 weeks after the illness begins).  After the blisters heal, the affected skin may be sensitive or painful for months (neuralgia). This often gradually goes away.  A shingles vaccine is available. This can help prevent shingles or make it less painful. It is generally recommended for adults over the age of 60 who have had chicken pox in the past, but who have never had shingles. Adults over 60 who have had neither chicken pox nor shingles can prevent both diseases with the chicken pox vaccine. Ask your healthcare provider about these vaccines.  Home care  · Medicines may be prescribed to help relieve " pain. Take these medicines as directed. Ask your healthcare provider or pharmacist before using over-the-counter medicines for helping treat pain and itching.  · In certain cases, antiviral medicines may be prescribed to reduce pain, shorten the illness, and prevent neuralgia. Take these medicines as directed.  · Compresses made from a solution of cool water mixed with cornstarch or baking soda may help relieve pain and itching.   · Gently wash skin daily with soap and water to help prevent infection.  Be certain to rinse off all of the soap, which can be irritating.  · Trim fingernails and try not to scratch. Scratching the sores may leave scars.  · Stay home from work or school until all blisters have formed a crust and you are no longer contagious.  Follow-up care  Follow up with your healthcare provider or as directed by our staff.  When to seek medical advice  · Fever of 100.4°F (38°C) or higher, or as directed by your healthcare provider  · Affected skin is on the face or neck and any of the following occur:  ¨ Headache  ¨ Eye pain  ¨ Changes in vision  ¨ Sores near the eye  ¨ Weakness of facial muscles  · Pain, redness, or swelling of a joint  · Signs of skin infection: colored drainage from the sores, warmth, increasing redness, or increasing pain  Date Last Reviewed: 9/25/2015  © 3330-1600 [employer]. [address] [place] [address], [place] [address]. All rights reserved. This information is not intended as a substitute for professional medical care. Always follow your healthcare professional's instructions.

## 2018-07-17 NOTE — LETTER
July 17, 2018      Ashlie Adam MD  [address]  [place] [address]           [facility]  [address] [facility] [address]  Phone: [phone]  Fax: [phone]          Patient: Zara Ann   MR Number: [id number]   YOB: 1945   Date of Visit: 7/17/2018       Dear Dr. Ashlie Adam:    Thank you for referring Zara Ann to me for evaluation. Attached you will find relevant portions of my assessment and plan of care.    HZ involving eyelid OS, no cornea/ globe manifestation. I'll f/u prn if any changes.    If you have questions, please do not hesitate to call me. I look forward to following Zara Ann along with you.    Sincerely,    VASYL Hernández, OD    Enclosure  CC:  No Recipients    If you would like to receive this communication electronically, please contact [email or web address] or [phone] to request more information on [employer] access.    For providers and/or their staff who would like to refer a patient to [facility], please contact us through our one-stop-shop provider referral line, [facility], at [phone].    If you feel you have received this communication in error or would no longer like to receive these types of communications, please e-mail [email or web address]

## 2018-07-25 DIAGNOSIS — B02.31 HERPES ZOSTER CONJUNCTIVITIS: ICD-10-CM

## 2018-07-25 NOTE — TELEPHONE ENCOUNTER
----- Message from Whitloren Alcaraz sent at 7/25/2018  4:38 PM CDT -----  Rx Valacyclouir.  Please send into [facility].  Any questions call [phone].

## 2018-07-26 RX ORDER — VALACYCLOVIR HYDROCHLORIDE 1 G/1
1000 TABLET, FILM COATED ORAL 3 TIMES DAILY
Qty: 21 TABLET | Refills: 0 | Status: SHIPPED | OUTPATIENT
Start: 2018-07-26 | End: 2018-08-22 | Stop reason: SDUPTHER

## 2018-07-26 NOTE — TELEPHONE ENCOUNTER
----- Message from Irina Leyva sent at 7/26/2018 11:54 AM CDT -----  Contact: self  Patient want to know if any other shingle medication has been prescribed for her, please call back at [phone] (home)

## 2018-07-26 NOTE — TELEPHONE ENCOUNTER
An additional course of valtrex is usually not needed unless she is having new blisters appear. How is the rash at this point?

## 2018-07-26 NOTE — TELEPHONE ENCOUNTER
Patient states that the rash on her face is gone but her hair/head hurts. Not sure if it is there or not. States that she read where you need to be on the treatment for an extended period of time. Please advise

## 2018-08-01 ENCOUNTER — TELEPHONE (OUTPATIENT)
Dept: FAMILY MEDICINE | Facility: CLINIC | Age: 73
End: 2018-08-01

## 2018-08-01 ENCOUNTER — NURSE TRIAGE (OUTPATIENT)
Dept: ADMINISTRATIVE | Facility: CLINIC | Age: 73
End: 2018-08-01

## 2018-08-01 ENCOUNTER — OFFICE VISIT (OUTPATIENT)
Dept: OPTOMETRY | Facility: CLINIC | Age: 73
End: 2018-08-01
Payer: MEDICARE

## 2018-08-01 ENCOUNTER — TELEPHONE (OUTPATIENT)
Dept: OPTOMETRY | Facility: CLINIC | Age: 73
End: 2018-08-01

## 2018-08-01 DIAGNOSIS — B02.22 POST-HERPETIC TRIGEMINAL NEURALGIA: Primary | ICD-10-CM

## 2018-08-01 PROCEDURE — 99999 PR PBB SHADOW E&M-EST. PATIENT-LVL III: CPT | Mod: PBBFAC,,, | Performed by: OPTOMETRIST

## 2018-08-01 PROCEDURE — 92012 INTRM OPH EXAM EST PATIENT: CPT | Mod: S$GLB,,, | Performed by: OPTOMETRIST

## 2018-08-01 NOTE — TELEPHONE ENCOUNTER
She is going to visit her grandchildren, her daughter in law told her that she doesn't want her to come visit because the children might catch chicken pox from her because she had shingles.  She said all lesions are gone and healed.  The children are 9 and 5. Please call her with advice.

## 2018-08-01 NOTE — TELEPHONE ENCOUNTER
----- Message from Claudy Graves sent at 8/1/2018  9:09 AM CDT -----  Contact: Pt  Type: Needs Medical Advice    Who Called:  Pt  Symptoms (please be specific):  Sharp pain in left eye after shingles went away  How long has patient had these symptoms:  Past 3 days  Pharmacy name and phone #:    [facility] [place] [facility] [id number] - [place] [address]  [address]  [place] [address]  Phone: [phone] Fax: [phone]    Best Call Back Number: [phone]   Additional Information: please call pt to advise.  Pt is leaving town tomorrow morning

## 2018-08-01 NOTE — PROGRESS NOTES
"HPI     Eye Pain    Additional comments: stabbing pains OS x 2 days, some relief when closing   eye -- +erythromycin shanti  // no redness, discharge or blurred VA            Comments   Agree above  Notes worsening pain / discomfort on Left sided and around OS  VA is "normal"           Last edited by VASYL Hernández, OD on 8/1/2018  1:21 PM. (History)        ROS     Positive for: Eyes    Negative for: Constitutional, Gastrointestinal, Neurological, Skin,   Genitourinary, Musculoskeletal, HENT, Endocrine, Cardiovascular,   Respiratory, Psychiatric, Allergic/Imm, Heme/Lymph    Last edited by VASYL Hernández, OD on 8/1/2018  1:21 PM. (History)        Assessment /Plan     For exam results, see Encounter Report.    Post-herpetic trigeminal neuralgia      No active keratitis / uveitis OS  Ocular health appears wnl   Discussed above and reassured, reviewed s/s of keratitis and uveitis related to HZ  Consider taking the gabapentin to help with discomfort. Patient concerned with sleepiness that occurs with full dosing, advised to try and take 1/3 or 1/2 capsule dose  ATs may help as well, gave otc suggestions  RTC prn                   "

## 2018-08-01 NOTE — TELEPHONE ENCOUNTER
----- Message from Candi Carrillo sent at 8/1/2018  3:57 PM CDT -----  Contact: self [phone]  She is going to visit her grandchildren, her daughter in law told her that she doesn't want her to come visit because the children might catch chicken pox from her because she had shingles.  She said all lesions are gone and healed.  The children are 9 and 5. Please call her with advice.  Thank you!

## 2018-08-02 NOTE — TELEPHONE ENCOUNTER
Called patient to discuss recommendations. She stated that someone was supposed to call her back yesterday (notably, she had called for advice at 3:57 PM), expressed anger that no one called her back yesterday (she had received a call back from LPN in this clinic at 4:37), and stated that she did not need assistance at this time.

## 2018-08-21 ENCOUNTER — OFFICE VISIT (OUTPATIENT)
Dept: PAIN MEDICINE | Facility: CLINIC | Age: 73
End: 2018-08-21
Payer: MEDICARE

## 2018-08-21 VITALS
HEART RATE: 65 BPM | OXYGEN SATURATION: 98 % | SYSTOLIC BLOOD PRESSURE: 152 MMHG | TEMPERATURE: 97 F | DIASTOLIC BLOOD PRESSURE: 69 MMHG | WEIGHT: 153.44 LBS | RESPIRATION RATE: 20 BRPM | BODY MASS INDEX: 28.24 KG/M2 | HEIGHT: 62 IN

## 2018-08-21 DIAGNOSIS — M48.02 CERVICAL STENOSIS OF SPINAL CANAL: Primary | ICD-10-CM

## 2018-08-21 PROCEDURE — 99499 UNLISTED E&M SERVICE: CPT | Mod: S$GLB,,, | Performed by: ANESTHESIOLOGY

## 2018-08-21 PROCEDURE — 99204 OFFICE O/P NEW MOD 45 MIN: CPT | Mod: S$GLB,,, | Performed by: ANESTHESIOLOGY

## 2018-08-21 PROCEDURE — 99999 PR PBB SHADOW E&M-EST. PATIENT-LVL IV: CPT | Mod: PBBFAC,,, | Performed by: ANESTHESIOLOGY

## 2018-08-21 NOTE — PROGRESS NOTES
This note was completed with dictation software and grammatical errors may exist.    CC:  Hand numbness    HPI:  The patient is a 73-year-old woman with a history of with a history of GERD, osteopenia, ulcer of colitis who presents in referral from Dr. ann for bilateral hand numbness.  Patient states that she made this appointment about 2 months ago when she woke up 1 day without any prior trauma or incidents in the recent past, complained of bilateral hand numbness.  She states that her hands went completely numb and she was having difficulty actually using her hands.  She denies any weakness in her hands, denied any pain throughout her arms or neck.  She states that over the next several days this pain resolved.  During the last month she actually developed about a of shingles and what seems to be a V1 and V2 distribution, she began taking some Gabapentin and this helped, she is no longer taking this, the hand numbness and tingling has not returned, she is not having any weakness in her arms, denies any major balance issues.  She does report that when she extends her neck she does not necessarily have pain but she does get lightheaded.  She reports a history of about 15 years ago having severe neck pain and her left arm went numb and weak.  She states that she was concerned that she may have to have surgery but after 1.5 months of physical therapy, all of her strength returned and she has not had any issues with her neck or arms since then.      ROS:  She reports dizziness, occasional balance issues, joint pain, balance of review of systems is negative.    Past Medical History:   Diagnosis Date    Depression     Diverticulosis     Genital herpes     GERD (gastroesophageal reflux disease)     Gout     History of use of hearing aid in left ear     Osteopenia     RAD (reactive airway disease)     Rheumatic fever     UC (ulcerative colitis)        Past Surgical History:   Procedure Laterality Date     "APPENDECTOMY      CHOLECYSTECTOMY      COLONOSCOPY  2013    every 2 yrs    HYSTERECTOMY      parital - benign - fibroids    Rectocele  1983    with hysterectomy       Social History     Socioeconomic History    Marital status:      Spouse name: None    Number of children: None    Years of education: None    Highest education level: None   Social Needs    Financial resource strain: None    Food insecurity - worry: None    Food insecurity - inability: None    Transportation needs - medical: None    Transportation needs - non-medical: None   Occupational History    None   Tobacco Use    Smoking status: Never Smoker    Smokeless tobacco: Never Used   Substance and Sexual Activity    Alcohol use: Yes     Comment: rarely     Drug use: No    Sexual activity: None   Other Topics Concern    None   Social History Narrative    None         Medications/Allergies: See med card    Vitals:    08/21/18 1020   BP: (!) 152/69   Pulse: 65   Resp: 20   Temp: 96.5 °F (35.8 °C)   TempSrc: Oral   SpO2: 98%   Weight: 69.6 kg (153 lb 7 oz)   Height: 5' 2" (1.575 m)   PainSc:   4   PainLoc: Neck         Physical exam:  Gen: A and O x3, pleasant, well-groomed  Skin: No rashes or obvious lesions  HEENT: PERRLA, no obvious deformities on ears or in canals.Trachea midline.  CVS: Regular rate and rhythm, normal palpable pulses.  Resp: Clear to auscultation bilaterally, no wheezes or rales.  Abdomen: Soft, NT/ND.  Musculoskeletal: No antalgic gait.     Neuro:  Upper extremities: 5/5 strength bilaterally   Reflexes: Brachioradialis 2+, Bicep 2+, Tricep 2+.   Sensory: Intact and symmetrical to light touch and pinprick in C2-T1 dermatomes bilaterally.    Cervical Spine:  Cervical spine: ROM is full in flexion, extension and lateral rotation but has difficulty with extension due to discomfort and dizziness  Spurling's maneuver causes no neck pain to either side.  Myofascial exam: No Tenderness to palpation across cervical " paraspinous region bilaterally.    Imagin18 MRI C-spine  C2-3: There is very mild facet joint arthropathy.  There is a very shallow broad central disc protrusion.  There is no spinal canal or significant foraminal stenosis.  C3-4: There is bilateral uncovertebral spurring and left greater than right facet joint arthropathy.  There is no spinal stenosis.  There is mild bilateral foraminal stenosis.  C4-5: There is disc space narrowing.  There is left greater than right facet joint arthropathy.  There is also uncovertebral spurring.  There is mild left foraminal stenosis.  There is no spinal canal or right foraminal stenosis.  C5-6: There is disc space narrowing and trace anterolisthesis.  There is right greater than left facet joint arthropathy and uncovertebral spurring.  There is a broad disc protrusion/osteophyte which narrows the subarachnoid space.  There is mild spinal stenosis without cord compression.  There is mild-to-moderate left and moderate-to-marked right foraminal stenosis.  C6-7: There is moderate to marked disc space narrowing.  There is bilateral, left greater than right, uncovertebral spurring.  There is a moderate broad disc protrusion/osteophyte which obscures the ventral subarachnoid space and results in mild flattening of the ventral cord surface.  Again, there is subtle changes of signal abnormality in the cord suggestive of mild edema or myelomalacia.  There is moderate central spinal stenosis.  There is marked left and mild-to-moderate right foraminal stenosis.  C7-T1: There is left greater than right facet joint arthropathy.  There is no spinal canal or significant foraminal stenosis.  There are small bilateral foraminal perineural cysts.  T1-2: There are small bilateral foraminal perineural cyst.  Also, there is left greater than right facet joint arthropathy.  There is no spinal canal or significant foraminal stenosis.  There is trace anterolisthesis of C4 on C5 and C5 on C6 with  3 mm retrolisthesis of C6 on C7, exaggerated by endplate osteophyte formation.  There is moderate to marked disc space narrowing at the C6-7 level with mild-to-moderate disc space narrowing at the C4-5 and C5-6 levels.  There is mild degenerative endplate signal change at these levels.  Baseline marrow signal intensity is normal.Spinal canal, cord, epidural space: The spinal canal is developmentally normal.  There is minimal flattening of the ventral cord surface at the C6-7 level where there is a disc protrusion/osteophyte complex.  There is very subtle STIR and T2 hyperintense signal within the cord at this level which may reflect changes of edema or myelomalacia.      Assessment:   The patient is a 73-year-old woman with a history of with a history of GERD, osteopenia, ulcer of colitis who presents in referral from Dr. ann for bilateral hand numbness.      1. Cervical stenosis of spinal canal           Plan:  1.  We discussed that she has stenosis at C6/7 due to disc bulging and actually has signal change in the cord at that level. I suspect this occurred with her left arm weakness episode that happened 15 years ago and since she is neurologically intact at this point, this is not of major concern for neuro surgical referral.  Since she is not even having any symptoms at this point, we discussed having her follow up as needed.  I explained if she ever develops severe arm pain, hand numbness or weakness that she should contact us.  We discussed that if she ever develops bowel or bladder incontinence, balance difficulty, lower extremity weakness I would like her to contact us or present to the emergency department.    Thank you for referring this interesting patient, and I look forward to continuing to collaborate in her care.

## 2018-08-21 NOTE — LETTER
August 21, 2018      Ashlie Adam MD  [address]  [place] [address]           [place] [facility]  [address] [facility] [address]  Phone: [phone]  Fax: [phone]          Patient: Zara Ann   MR Number: [id number]   YOB: 1945   Date of Visit: 8/21/2018       Dear Dr. Ashlie Adam:    Thank you for referring Zara Ann to me for evaluation. Attached you will find relevant portions of my assessment and plan of care.    If you have questions, please do not hesitate to call me. I look forward to following Zara Ann along with you.    Sincerely,    Jeremias Gomez MD    Enclosure  CC:  No Recipients    If you would like to receive this communication electronically, please contact [email or web address] or [phone] to request more information on [employer] [facility] access.    For providers and/or their staff who would like to refer a patient to [facility], please contact us through our one-stop-shop provider referral line, [facility], at [phone].    If you feel you have received this communication in error or would no longer like to receive these types of communications, please e-mail [email or web address]

## 2018-08-22 ENCOUNTER — OFFICE VISIT (OUTPATIENT)
Dept: FAMILY MEDICINE | Facility: CLINIC | Age: 73
End: 2018-08-22
Payer: MEDICARE

## 2018-08-22 VITALS
DIASTOLIC BLOOD PRESSURE: 64 MMHG | TEMPERATURE: 98 F | HEIGHT: 62 IN | WEIGHT: 153.44 LBS | SYSTOLIC BLOOD PRESSURE: 120 MMHG | BODY MASS INDEX: 28.24 KG/M2 | RESPIRATION RATE: 18 BRPM

## 2018-08-22 DIAGNOSIS — B02.31 HERPES ZOSTER CONJUNCTIVITIS: ICD-10-CM

## 2018-08-22 PROCEDURE — 99999 PR PBB SHADOW E&M-EST. PATIENT-LVL III: CPT | Mod: PBBFAC,,, | Performed by: FAMILY MEDICINE

## 2018-08-22 PROCEDURE — 99213 OFFICE O/P EST LOW 20 MIN: CPT | Mod: S$GLB,,, | Performed by: FAMILY MEDICINE

## 2018-08-22 RX ORDER — VALACYCLOVIR HYDROCHLORIDE 1 G/1
1000 TABLET, FILM COATED ORAL DAILY PRN
Qty: 10 TABLET | Refills: 0 | Status: SHIPPED | OUTPATIENT
Start: 2018-08-22 | End: 2020-07-22 | Stop reason: SDUPTHER

## 2018-08-22 NOTE — PROGRESS NOTES
Subjective:       Patient ID: Zara Ann is a 73 y.o. female.    Chief Complaint: Follow-up    HPI  Patient in the office for shingles f/u.   Lesions and rash resolved fully.   She did see the optometrist as well who noted a normal exam.  She does recall having some shooting pain/headache to the R face.   She had re-eval in optometry when the pain started.   Labs 6/2018 rev.    Review of Systems   Constitutional: Negative for fatigue and fever.   HENT: Negative for congestion and postnasal drip.    Respiratory: Negative for cough and shortness of breath.    Skin: Negative for rash and wound.   Neurological: Negative for light-headedness, numbness and headaches.       Objective:      Physical Exam   Constitutional: She is oriented to person, place, and time. She appears well-developed and well-nourished. No distress.   HENT:   Head: Normocephalic and atraumatic.   Eyes: Conjunctivae are normal. Right eye exhibits no discharge. Left eye exhibits no discharge. No scleral icterus.   Neck: Normal range of motion. Neck supple.   Cardiovascular: Normal rate.   Pulmonary/Chest: Effort normal. No respiratory distress.   Abdominal: Soft. She exhibits no distension.   Musculoskeletal: Normal range of motion. She exhibits no edema.   Neurological: She is alert and oriented to person, place, and time.   Skin: Skin is warm and dry. No rash noted.   Rash resolved   Psychiatric: She has a normal mood and affect. Her behavior is normal.   Nursing note and vitals reviewed.        Herpes zoster conjunctivitis  -     valACYclovir (VALTREX) 1000 MG tablet; Take 1 tablet (1,000 mg total) by mouth daily as needed (outbreak).  Dispense: 10 tablet; Refill: 0  Appears resolved.  Pt requests valtrex rx on hand for any suspected recurrence. Discussed that she needs to let me know if she has to start the rx and that the dose is different for herpes vs shingles. Pt expressed understanding.

## 2018-09-04 ENCOUNTER — INITIAL CONSULT (OUTPATIENT)
Dept: DERMATOLOGY | Facility: CLINIC | Age: 73
End: 2018-09-04
Payer: MEDICARE

## 2018-09-04 VITALS — RESPIRATION RATE: 16 BRPM | HEIGHT: 62 IN | BODY MASS INDEX: 28.16 KG/M2 | WEIGHT: 153 LBS

## 2018-09-04 DIAGNOSIS — D22.9 MULTIPLE BENIGN NEVI: ICD-10-CM

## 2018-09-04 DIAGNOSIS — L82.1 SEBORRHEIC KERATOSES: ICD-10-CM

## 2018-09-04 DIAGNOSIS — Z12.83 SKIN CANCER SCREENING: ICD-10-CM

## 2018-09-04 DIAGNOSIS — D48.5 NEOPLASM OF UNCERTAIN BEHAVIOR OF SKIN: Primary | ICD-10-CM

## 2018-09-04 PROCEDURE — 88305 TISSUE EXAM BY PATHOLOGIST: CPT | Performed by: PATHOLOGY

## 2018-09-04 PROCEDURE — 11100 PR BIOPSY OF SKIN LESION: CPT | Mod: S$PBB,,, | Performed by: DERMATOLOGY

## 2018-09-04 PROCEDURE — 99202 OFFICE O/P NEW SF 15 MIN: CPT | Mod: 25,S$PBB,, | Performed by: DERMATOLOGY

## 2018-09-04 PROCEDURE — 99213 OFFICE O/P EST LOW 20 MIN: CPT | Mod: PBBFAC,25,PO | Performed by: DERMATOLOGY

## 2018-09-04 PROCEDURE — 88341 IMHCHEM/IMCYTCHM EA ADD ANTB: CPT | Mod: 26,,, | Performed by: PATHOLOGY

## 2018-09-04 PROCEDURE — 99999 PR PBB SHADOW E&M-EST. PATIENT-LVL III: CPT | Mod: PBBFAC,,, | Performed by: DERMATOLOGY

## 2018-09-04 PROCEDURE — 1101F PT FALLS ASSESS-DOCD LE1/YR: CPT | Mod: CPTII,,, | Performed by: DERMATOLOGY

## 2018-09-04 PROCEDURE — 88342 IMHCHEM/IMCYTCHM 1ST ANTB: CPT | Mod: 26,,, | Performed by: PATHOLOGY

## 2018-09-04 PROCEDURE — 11100 PR BIOPSY OF SKIN LESION: CPT | Mod: PBBFAC,PO | Performed by: DERMATOLOGY

## 2018-09-04 NOTE — LETTER
September 4, 2018      Ashlie Adam MD  [address]  [place] [address]           [facility]  [address] [facility] [address] [place] [address]  Phone: [phone]  Fax: [phone]          Patient: Zara Ann   MR Number: [id number]   YOB: 1945   Date of Visit: 9/4/2018       Dear Dr. Ashlie Adam:    Thank you for referring Zara Ann to me for evaluation. Attached you will find relevant portions of my assessment and plan of care.    If you have questions, please do not hesitate to call me. I look forward to following Zara nAn along with you.    Sincerely,    Ashlie Collazo MD    Enclosure  CC:  No Recipients    If you would like to receive this communication electronically, please contact [email or web address] or [phone] to request more information on [employer] Link access.    For providers and/or their staff who would like to refer a patient to [facility], please contact us through our one-stop-shop provider referral line, [facility], at [phone].    If you feel you have received this communication in error or would no longer like to receive these types of communications, please e-mail [email or web address]

## 2018-09-04 NOTE — PROGRESS NOTES
Subjective:       Patient ID:  Zara Ann is a 73 y.o. female who presents for   Chief Complaint   Patient presents with    Skin Check    Lesion     left upper arm      Initial visit for skin check   Lesion to Left upper arm - years , dark in color . Has increased in size in last 10 yrs .      No phx skin ca   No known fhx skin ca     Past Medical History:  No date: Depression  No date: Diverticulosis  No date: Genital herpes  No date: GERD (gastroesophageal reflux disease)  No date: Gout  No date: History of use of hearing aid in left ear  No date: Osteopenia  No date: RAD (reactive airway disease)  No date: Rheumatic fever  No date: UC (ulcerative colitis)          Review of Systems   Skin: Positive for sun sensitivity, activity-related sunscreen use and wears hat. Negative for daily sunscreen use, sensitivity to antibiotic ointment, sensitivity to bandage adhesive and tendency to form keloidal scars.   Hematologic/Lymphatic: Bruises/bleeds easily.        Objective:    Physical Exam   Constitutional: She appears well-developed and well-nourished. No distress.   HENT:   Mouth/Throat: Lips normal.    Eyes: Lids are normal.  No conjunctival no injection.   Cardiovascular: There is no local extremity swelling and no dependent edema.     Neurological: She is alert and oriented to person, place, and time. She is not disoriented.   Psychiatric: She has a normal mood and affect.   Skin:   Areas Examined (abnormalities noted in diagram):   Head / Face Inspection Performed  Neck Inspection Performed  Chest / Axilla Inspection Performed  Abdomen Inspection Performed  Back Inspection Performed  RUE Inspected  LUE Inspection Performed  RLE Inspected  LLE Inspection Performed              Diagram Legend     Erythematous scaling macule/papule c/w actinic keratosis       Vascular papule c/w angioma      Pigmented verrucoid papule/plaque c/w seborrheic keratosis      Yellow umbilicated papule c/w sebaceous hyperplasia       Irregularly shaped tan macule c/w lentigo     1-2 mm smooth white papules consistent with Milia      Movable subcutaneous cyst with punctum c/w epidermal inclusion cyst      Subcutaneous movable cyst c/w pilar cyst      Firm pink to brown papule c/w dermatofibroma      Pedunculated fleshy papule(s) c/w skin tag(s)      Evenly pigmented macule c/w junctional nevus     Mildly variegated pigmented, slightly irregular-bordered macule c/w mildly atypical nevus      Flesh colored to evenly pigmented papule c/w intradermal nevus       Pink pearly papule/plaque c/w basal cell carcinoma      Erythematous hyperkeratotic cursted plaque c/w SCC      Surgical scar with no sign of skin cancer recurrence      Open and closed comedones      Inflammatory papules and pustules      Verrucoid papule consistent consistent with wart     Erythematous eczematous patches and plaques     Dystrophic onycholytic nail with subungual debris c/w onychomycosis     Umbilicated papule    Erythematous-base heme-crusted tan verrucoid plaque consistent with inflamed seborrheic keratosis     Erythematous Silvery Scaling Plaque c/w Psoriasis     See annotation      Assessment / Plan:      Pathology Orders:     Normal Orders This Visit    Tissue Specimen To Pathology, Dermatology     Questions:    Directional Terms:  Other(comment)    Clinical information:  nevus r/o atypia    Specific Site:  left arm        Neoplasm of uncertain behavior of skin  -     Tissue Specimen To Pathology, Dermatology    Shave biopsy procedure note:    Shave biopsy performed after verbal consent including risk of infection, scar, recurrence, need for additional treatment of site. Area prepped with alcohol, anesthetized with approximately 1.0cc of 1% lidocaine with epinephrine. Lesional tissue shaved with razor blade. Hemostasis achieved with application of aluminum chloride followed by hyfrecation. No complications. Dressing applied. Wound care explained.          Skin  cancer screening  Upper body skin examination performed today including at least 6 points as noted in physical examination. No lesions suspicious for malignancy noted.        Seborrheic keratoses, trunk  These are benign inherited growths without a malignant potential. Reassurance given to patient. No treatment is necessary.       Multiple benign nevi  Discussed ABCDE's of nevi.  Monitor for new mole or moles that are becoming bigger, darker, irritated, or developing irregular borders.                Follow-up in about 6 months (around 3/4/2019).

## 2018-09-07 ENCOUNTER — OFFICE VISIT (OUTPATIENT)
Dept: NEUROLOGY | Facility: CLINIC | Age: 73
End: 2018-09-07
Payer: MEDICARE

## 2018-09-07 VITALS
DIASTOLIC BLOOD PRESSURE: 74 MMHG | WEIGHT: 152.13 LBS | RESPIRATION RATE: 16 BRPM | HEIGHT: 62 IN | HEART RATE: 72 BPM | SYSTOLIC BLOOD PRESSURE: 130 MMHG | BODY MASS INDEX: 27.99 KG/M2

## 2018-09-07 DIAGNOSIS — R20.0 NUMBNESS AND TINGLING OF UPPER AND LOWER EXTREMITIES OF BOTH SIDES: Primary | ICD-10-CM

## 2018-09-07 DIAGNOSIS — M50.023 CERVICAL DISC DISORDER AT C6-C7 LEVEL WITH MYELOPATHY: ICD-10-CM

## 2018-09-07 DIAGNOSIS — R20.2 NUMBNESS AND TINGLING OF UPPER AND LOWER EXTREMITIES OF BOTH SIDES: Primary | ICD-10-CM

## 2018-09-07 PROCEDURE — 99999 PR PBB SHADOW E&M-EST. PATIENT-LVL V: CPT | Mod: PBBFAC,,, | Performed by: PSYCHIATRY & NEUROLOGY

## 2018-09-07 PROCEDURE — 1101F PT FALLS ASSESS-DOCD LE1/YR: CPT | Mod: CPTII,,, | Performed by: PSYCHIATRY & NEUROLOGY

## 2018-09-07 PROCEDURE — 99204 OFFICE O/P NEW MOD 45 MIN: CPT | Mod: S$PBB,,, | Performed by: PSYCHIATRY & NEUROLOGY

## 2018-09-07 PROCEDURE — 99215 OFFICE O/P EST HI 40 MIN: CPT | Mod: PBBFAC,PO | Performed by: PSYCHIATRY & NEUROLOGY

## 2018-09-07 NOTE — LETTER
September 7, 2018      Ashlie Adam MD  [address]  [place] [address]           [facility] [place]  [address] [facility] [address] [place] [address]  Phone: [phone]  Fax: [phone]          Patient: Zara Ann   MR Number: [id number]   YOB: 1945   Date of Visit: 9/7/2018       Dear Dr. Ashlie Adam:    Thank you for referring Zara Ann to me for evaluation. Attached you will find relevant portions of my assessment and plan of care.    If you have questions, please do not hesitate to call me. I look forward to following Zara Ann along with you.    Sincerely,    Kallie Page MD    Enclosure  CC:  No Recipients    If you would like to receive this communication electronically, please contact [email or web address] or [phone] to request more information on [employer] Link access.    For providers and/or their staff who would like to refer a patient to [facility], please contact us through our one-stop-shop provider referral line, [facility], at [phone].    If you feel you have received this communication in error or would no longer like to receive these types of communications, please e-mail [email or web address]

## 2018-09-07 NOTE — PATIENT INSTRUCTIONS
TESTING:  -- repeat cervical MRI with contrast to learn if the white spot on your cord is new or old (we MAY be able to show this with contrast)  -- get a thoracic spine MRI with contrast to look for other spots  -- get a nerve conduction study of all 4 limbs to look for a peripheral neuropathy and/or multiple compression neuropathies     PREVENTION OF PAIN:   -- none at this time     AS-NEEDED TREATMENT OF PAIN:  -- none at this time

## 2018-09-10 ENCOUNTER — TELEPHONE (OUTPATIENT)
Dept: NEUROLOGY | Facility: CLINIC | Age: 73
End: 2018-09-10

## 2018-09-10 NOTE — TELEPHONE ENCOUNTER
Tried to call the patient in regards to scheduling her EMG and MRIs. Tried to call [phone], number was disconnected. Left voicemail on [phone] instructing to call back to schedule.         Called Dr. Barnes's office. They are able to get the patient in 9/29 and 10/2.

## 2018-09-12 ENCOUNTER — TELEPHONE (OUTPATIENT)
Dept: DERMATOLOGY | Facility: CLINIC | Age: 73
End: 2018-09-12

## 2018-09-14 ENCOUNTER — TELEPHONE (OUTPATIENT)
Dept: NEUROLOGY | Facility: CLINIC | Age: 73
End: 2018-09-14

## 2018-09-14 ENCOUNTER — HOSPITAL ENCOUNTER (OUTPATIENT)
Dept: RADIOLOGY | Facility: HOSPITAL | Age: 73
Discharge: HOME | End: 2018-09-14
Attending: PSYCHIATRY & NEUROLOGY
Payer: MEDICARE

## 2018-09-14 DIAGNOSIS — R20.2 NUMBNESS AND TINGLING OF UPPER AND LOWER EXTREMITIES OF BOTH SIDES: ICD-10-CM

## 2018-09-14 DIAGNOSIS — R20.0 NUMBNESS AND TINGLING OF UPPER AND LOWER EXTREMITIES OF BOTH SIDES: ICD-10-CM

## 2018-09-14 DIAGNOSIS — R20.2 NUMBNESS AND TINGLING OF UPPER AND LOWER EXTREMITIES OF BOTH SIDES: Primary | ICD-10-CM

## 2018-09-14 DIAGNOSIS — R20.0 NUMBNESS AND TINGLING OF UPPER AND LOWER EXTREMITIES OF BOTH SIDES: Primary | ICD-10-CM

## 2018-09-14 PROCEDURE — 72156 MRI NECK SPINE W/O & W/DYE: CPT | Mod: TC

## 2018-09-14 PROCEDURE — A9585 GADOBUTROL INJECTION: HCPCS | Performed by: PSYCHIATRY & NEUROLOGY

## 2018-09-14 PROCEDURE — 25500020 PHARM REV CODE 255: Performed by: PSYCHIATRY & NEUROLOGY

## 2018-09-14 PROCEDURE — 72157 MRI CHEST SPINE W/O & W/DYE: CPT | Mod: 26,,, | Performed by: RADIOLOGY

## 2018-09-14 PROCEDURE — 72157 MRI CHEST SPINE W/O & W/DYE: CPT | Mod: TC

## 2018-09-14 PROCEDURE — 72156 MRI NECK SPINE W/O & W/DYE: CPT | Mod: 26,,, | Performed by: RADIOLOGY

## 2018-09-14 RX ORDER — GADOBUTROL 604.72 MG/ML
6 INJECTION INTRAVENOUS
Status: COMPLETED | OUTPATIENT
Start: 2018-09-14 | End: 2018-09-14

## 2018-09-14 RX ORDER — GADOBUTROL 604.72 MG/ML
INJECTION INTRAVENOUS
Status: DISCONTINUED
Start: 2018-09-14 | End: 2018-09-15 | Stop reason: HOSPADM

## 2018-09-14 RX ADMIN — GADOBUTROL 6 ML: 604.72 INJECTION INTRAVENOUS at 03:09

## 2018-09-14 NOTE — TELEPHONE ENCOUNTER
----- Message from Kallie Page MD sent at 9/14/2018 12:43 PM CDT -----  Regarding: MRI  Changing orders to urgent. Patient is OK with going to [place]. Please call her.     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 The cord compression on the right especially is pretty convincing. I would do a single level anterior decompression as a protective measure.    S    ----- Message -----  From: Kallie Page MD  Sent: 9/7/2018   4:15 PM  To: Demetri Caro MD    Hey what do you think of this lady's cervical imaging (cord signal change). Has a pretty profound sensory exam with a suspected left sided thoracic sensory level but not much else going for myelopathy.

## 2018-09-14 NOTE — TELEPHONE ENCOUNTER
Tried to call the patient in regards to scheduling both MRIs and her EMG. No answer. Left voicemail to call back.         EMG order faxed to Dr. Blair's office at [phone].

## 2018-09-14 NOTE — TELEPHONE ENCOUNTER
----- Message from Kallie Page MD sent at 9/14/2018 12:55 PM CDT -----  Please message Dr. Caro's staff about the referral.

## 2018-09-18 ENCOUNTER — TELEPHONE (OUTPATIENT)
Dept: FAMILY MEDICINE | Facility: CLINIC | Age: 73
End: 2018-09-18

## 2018-09-18 DIAGNOSIS — R16.1 SPLENOMEGALY: Primary | ICD-10-CM

## 2018-09-18 DIAGNOSIS — I71.40 ABDOMINAL AORTIC ANEURYSM (AAA) WITHOUT RUPTURE: ICD-10-CM

## 2018-09-18 NOTE — TELEPHONE ENCOUNTER
Please let pt know that I reviewed her MRIs from neurology with regards to the possibly enlarged spleen and dilated aorta. Will review with ultrasound and if needed CT. Order for us done.

## 2018-09-19 NOTE — TELEPHONE ENCOUNTER
Pt called, advised of providers message, verbalized understanding. U/S scheduled. Pt aware of appointment time date and location. No further needs-LISET

## 2018-09-20 ENCOUNTER — INITIAL CONSULT (OUTPATIENT)
Dept: DERMATOLOGY | Facility: CLINIC | Age: 73
End: 2018-09-20
Payer: MEDICARE

## 2018-09-20 VITALS
WEIGHT: 152 LBS | SYSTOLIC BLOOD PRESSURE: 130 MMHG | DIASTOLIC BLOOD PRESSURE: 73 MMHG | BODY MASS INDEX: 27.97 KG/M2 | HEART RATE: 70 BPM | HEIGHT: 62 IN

## 2018-09-20 DIAGNOSIS — D03.62 MELANOMA IN SITU OF LEFT UPPER EXTREMITY: Primary | ICD-10-CM

## 2018-09-20 PROCEDURE — 99499 UNLISTED E&M SERVICE: CPT | Mod: S$GLB,,, | Performed by: DERMATOLOGY

## 2018-09-20 PROCEDURE — 99999 PR PBB SHADOW E&M-EST. PATIENT-LVL IV: CPT | Mod: PBBFAC,,, | Performed by: DERMATOLOGY

## 2018-09-20 PROCEDURE — 99214 OFFICE O/P EST MOD 30 MIN: CPT | Mod: S$PBB,,, | Performed by: DERMATOLOGY

## 2018-09-20 PROCEDURE — 99214 OFFICE O/P EST MOD 30 MIN: CPT | Mod: PBBFAC | Performed by: DERMATOLOGY

## 2018-09-20 PROCEDURE — 1101F PT FALLS ASSESS-DOCD LE1/YR: CPT | Mod: CPTII,,, | Performed by: DERMATOLOGY

## 2018-09-20 RX ORDER — AMOXICILLIN 500 MG
CAPSULE ORAL NIGHTLY
COMMUNITY
End: 2020-07-22

## 2018-09-20 NOTE — LETTER
September 20, 2018      Ashlie Collazo MD  [address] [facility] [address]  [facility] [address]           [facility]  [address]  [place] [address]  Phone: [phone]  Fax: [phone]          Patient: Zara Ann   MR Number: [id number]   YOB: 1945   Date of Visit: 9/20/2018       Dear Dr. Ashlie Collazo:    Thank you for referring Zara Ann to me for evaluation. Attached you will find relevant portions of my assessment and plan of care.    If you have questions, please do not hesitate to call me. I look forward to following Zara Ann along with you.    Sincerely,    Mikey May MD    Enclosure  CC:  No Recipients    If you would like to receive this communication electronically, please contact [email or web address] or [phone] to request more information on [employer] Link access.    For providers and/or their staff who would like to refer a patient to [facility], please contact us through our one-stop-shop provider referral line, [facility], at [phone].    If you feel you have received this communication in error or would no longer like to receive these types of communications, please e-mail [email or web address]

## 2018-09-24 ENCOUNTER — TELEPHONE (OUTPATIENT)
Dept: FAMILY MEDICINE | Facility: CLINIC | Age: 73
End: 2018-09-24

## 2018-09-24 NOTE — TELEPHONE ENCOUNTER
----- Message from Candi Carrillo sent at 9/24/2018  1:15 PM CDT -----  Contact: self [phone]  Please call her regarding her test results. Ultrasound and MRIs.  Thank you!

## 2018-09-25 ENCOUNTER — TELEPHONE (OUTPATIENT)
Dept: FAMILY MEDICINE | Facility: CLINIC | Age: 73
End: 2018-09-25

## 2018-09-25 RX ORDER — SERTRALINE HYDROCHLORIDE 100 MG/1
TABLET, FILM COATED ORAL
Qty: 180 TABLET | Refills: 1 | Status: SHIPPED | OUTPATIENT
Start: 2018-09-25 | End: 2019-05-25 | Stop reason: SDUPTHER

## 2018-09-25 NOTE — TELEPHONE ENCOUNTER
----- Message from Mohsen Merlos sent at 9/25/2018  9:35 AM CDT -----  Contact: pt  Pt is requesting a refill on her sertraline (ZOLOFT) 100 MG tablet  Call Back#[phone]  Thanks    [address] [facility] - [address]  [address]  [place] [address]  Phone: [phone] Fax: [phone]

## 2018-09-25 NOTE — TELEPHONE ENCOUNTER
Reviewed results with pt. Pt had questions in regards to her fatty liver and enlarged spleen.     Nonalcoholic fatty liver disease (NAFLD) is a common disease of the liver. It occurs when you have too much fat in the liver. If NAFLD is severe, it can cause liver damage that seems like the damage caused by drinking too much alcohol. But NAFLD is not caused by drinking alcohol. This sheet tells you more about NAFLD and how it can be managed.    Doctors don't know what causes NAFLD. But certain things make the problem more likely to happen. These include:  · Obesity  · Prediabetes or diabetes  · High levels of fat found in the blood (cholesterol and triglycerides)  · Being exposed to certain medicines     advised the since the liver is fatty that it could be contributing to her enlarged spleen.    pt verbalized understanding.

## 2018-09-27 ENCOUNTER — INITIAL CONSULT (OUTPATIENT)
Dept: NEUROSURGERY | Facility: CLINIC | Age: 73
End: 2018-09-27
Payer: MEDICARE

## 2018-09-27 VITALS
BODY MASS INDEX: 28.11 KG/M2 | SYSTOLIC BLOOD PRESSURE: 140 MMHG | DIASTOLIC BLOOD PRESSURE: 66 MMHG | WEIGHT: 152.75 LBS | HEIGHT: 62 IN | HEART RATE: 68 BPM

## 2018-09-27 DIAGNOSIS — F32.A DEPRESSION, UNSPECIFIED DEPRESSION TYPE: ICD-10-CM

## 2018-09-27 DIAGNOSIS — G95.20 CORD COMPRESSION MYELOPATHY: ICD-10-CM

## 2018-09-27 DIAGNOSIS — M48.02 CERVICAL STENOSIS OF SPINAL CANAL: Primary | ICD-10-CM

## 2018-09-27 DIAGNOSIS — M50.30 DDD (DEGENERATIVE DISC DISEASE), CERVICAL: ICD-10-CM

## 2018-09-27 DIAGNOSIS — M85.80 OSTEOPENIA, UNSPECIFIED LOCATION: ICD-10-CM

## 2018-09-27 PROCEDURE — 99214 OFFICE O/P EST MOD 30 MIN: CPT | Mod: PBBFAC,PN | Performed by: PHYSICIAN ASSISTANT

## 2018-09-27 PROCEDURE — 3288F FALL RISK ASSESSMENT DOCD: CPT | Mod: CPTII,,, | Performed by: PHYSICIAN ASSISTANT

## 2018-09-27 PROCEDURE — 1100F PTFALLS ASSESS-DOCD GE2>/YR: CPT | Mod: CPTII,,, | Performed by: PHYSICIAN ASSISTANT

## 2018-09-27 PROCEDURE — 99999 PR PBB SHADOW E&M-EST. PATIENT-LVL IV: CPT | Mod: PBBFAC,,, | Performed by: PHYSICIAN ASSISTANT

## 2018-09-27 PROCEDURE — 99499 UNLISTED E&M SERVICE: CPT | Mod: S$GLB,,, | Performed by: PHYSICIAN ASSISTANT

## 2018-09-27 PROCEDURE — 99204 OFFICE O/P NEW MOD 45 MIN: CPT | Mod: S$PBB,,, | Performed by: PHYSICIAN ASSISTANT

## 2018-09-27 NOTE — LETTER
October 3, 2018      Kallie Page MD  [address] [facility] [address]  [address]  [facility] [address]           [facility] [facility]  [address] [facility] [place] [address]  Phone: [phone]  Fax: [phone]          Patient: Zara Ann   MR Number: [id number]   YOB: 1945   Date of Visit: 9/27/2018       Dear Dr. Kallie Page:    Thank you for referring Zara Ann to me for evaluation. Attached you will find relevant portions of my assessment and plan of care.    If you have questions, please do not hesitate to call me. I look forward to following Zara Ann along with you.    Sincerely,    MUSTAPHA Yorkosure  CC:  No Recipients    If you would like to receive this communication electronically, please contact [email or web address] or [phone] to request more information on [employer] Link access.    For providers and/or their staff who would like to refer a patient to [facility], please contact us through our one-stop-shop provider referral line, [facility], at [phone].    If you feel you have received this communication in error or would no longer like to receive these types of communications, please e-mail [email or web address]

## 2018-09-28 ENCOUNTER — TELEPHONE (OUTPATIENT)
Dept: DERMATOLOGY | Facility: CLINIC | Age: 73
End: 2018-09-28

## 2018-09-28 NOTE — TELEPHONE ENCOUNTER
Returned patient call and I rescheduoled her for 11-12-18, she is having surgery on her neck and had to push her surgery with us back. Jennifer

## 2018-09-28 NOTE — TELEPHONE ENCOUNTER
----- Message from Kenneth Urbina sent at 9/28/2018  3:06 PM CDT -----  Type: Needs Medical Advice    Who Called:  Self   Symptoms (please be specific):  NA   How long has patient had these symptoms:  JERRY   Pharmacy name and phone #:  JERRY   Best Call Back Number: [phone]  Additional Information: Patient need to reschedule appointment.

## 2018-10-03 ENCOUNTER — TELEPHONE (OUTPATIENT)
Dept: NEUROSURGERY | Facility: CLINIC | Age: 73
End: 2018-10-03

## 2018-10-03 DIAGNOSIS — R79.1 ABNORMAL COAGULATION PROFILE: ICD-10-CM

## 2018-10-03 DIAGNOSIS — M50.30 DEGENERATION OF CERVICAL INTERVERTEBRAL DISC: ICD-10-CM

## 2018-10-03 DIAGNOSIS — G95.20 CORD COMPRESSION MYELOPATHY: Primary | ICD-10-CM

## 2018-10-03 DIAGNOSIS — R82.90 ABNORMAL FINDING IN URINE: ICD-10-CM

## 2018-10-03 DIAGNOSIS — M48.02 CERVICAL STENOSIS OF SPINE: ICD-10-CM

## 2018-10-03 DIAGNOSIS — M48.02 STENOSIS OF CERVICAL SPINE: ICD-10-CM

## 2018-10-03 RX ORDER — LIDOCAINE HYDROCHLORIDE 10 MG/ML
1 INJECTION, SOLUTION EPIDURAL; INFILTRATION; INTRACAUDAL; PERINEURAL ONCE
Status: CANCELLED | OUTPATIENT
Start: 2018-10-03 | End: 2018-10-03

## 2018-10-03 RX ORDER — SODIUM CHLORIDE, SODIUM LACTATE, POTASSIUM CHLORIDE, CALCIUM CHLORIDE 600; 310; 30; 20 MG/100ML; MG/100ML; MG/100ML; MG/100ML
INJECTION, SOLUTION INTRAVENOUS CONTINUOUS
Status: CANCELLED | OUTPATIENT
Start: 2018-10-03

## 2018-10-03 NOTE — PROGRESS NOTES
Neurosurgery History & Physical    Patient ID: Zara Ann is a 73 y.o. female.    Chief Complaint   Patient presents with    Neurologic Problem     Cervical DDD. Here to discuss sx options. Patient denies pain at this time. She reports she has numbness in right hand often and occationally in left hand.       Review of Systems   Constitutional: Negative for activity change, chills, fever and unexpected weight change.   HENT: Negative for hearing loss, tinnitus, trouble swallowing and voice change.    Eyes: Negative.  Negative for visual disturbance.   Respiratory: Negative for apnea, chest tightness and shortness of breath.    Cardiovascular: Negative.  Negative for chest pain and palpitations.   Gastrointestinal: Positive for constipation. Negative for abdominal pain, diarrhea, nausea and vomiting.   Genitourinary: Negative.  Negative for difficulty urinating, dysuria and frequency.   Musculoskeletal: Positive for neck pain and neck stiffness. Negative for back pain and gait problem.   Skin: Negative for wound.   Allergic/Immunologic: Negative for immunocompromised state.   Neurological: Positive for numbness. Negative for dizziness, tremors, seizures, facial asymmetry, speech difficulty, weakness, light-headedness and headaches.   Psychiatric/Behavioral: Negative for confusion and decreased concentration.       Past Medical History:   Diagnosis Date    Depression     Diverticulosis     Genital herpes     GERD (gastroesophageal reflux disease)     Gout     History of use of hearing aid in left ear     Osteopenia     RAD (reactive airway disease)     Rheumatic fever     UC (ulcerative colitis)      Past Surgical History:   Procedure Laterality Date    APPENDECTOMY      CHOLECYSTECTOMY      COLONOSCOPY  2013    every 2 yrs    HYSTERECTOMY      parital - benign - fibroids    Rectocele  1983    with hysterectomy     Social History     Socioeconomic History    Marital status:      Spouse  name: Not on file    Number of children: Not on file    Years of education: Not on file    Highest education level: Not on file   Social Needs    Financial resource strain: Not on file    Food insecurity - worry: Not on file    Food insecurity - inability: Not on file    Transportation needs - medical: Not on file    Transportation needs - non-medical: Not on file   Occupational History    Not on file   Tobacco Use    Smoking status: Never Smoker    Smokeless tobacco: Never Used   Substance and Sexual Activity    Alcohol use: Yes     Comment: rarely     Drug use: No    Sexual activity: Not on file   Other Topics Concern    Not on file   Social History Narrative    Not on file     Family History   Problem Relation Age of Onset    Cancer Mother     Cancer Father     Ulcers Sister     Pancreatitis Sister     Breast cancer Sister     Liver cancer Paternal Aunt      Review of patient's allergies indicates:   Allergen Reactions    Ciprofloxacin Shortness Of Breath    Clindamycin Shortness Of Breath    Nitrofurantoin monohyd/m-cryst Shortness Of Breath and Anaphylaxis    Penicillins Hives    Sulfa (sulfonamide antibiotics) Hives    Cephalosporins     Hydrocod-cpm-pe-acetaminophen     Hydrocortisone Nausea Only       Current Outpatient Medications:     acyclovir 5% (ZOVIRAX) 5 % ointment, Apply topically 5 (five) times daily., Disp: 15 g, Rfl: 1    ascorbic acid (VITAMIN C) 500 MG tablet, Take 500 mg by mouth once daily., Disp: , Rfl:     aspirin (ECOTRIN) 81 MG EC tablet, Take 81 mg by mouth once daily., Disp: , Rfl:     calcium-vitamin D3 500 mg(1,250mg) -200 unit per tablet, Take 1 tablet by mouth 2 (two) times daily with meals., Disp: , Rfl:     conjugated estrogens (PREMARIN) vaginal cream, Place 0.5 g vaginally 3 (three) times a week., Disp: 30 g, Rfl: 3    cyanocobalamin, vitamin B-12, (VITAMIN B-12) 50 mcg tablet, Take 50 mcg by mouth once daily., Disp: , Rfl:     fish  "oil-omega-3 fatty acids 300-1,000 mg capsule, Take by mouth once daily., Disp: , Rfl:     gabapentin (NEURONTIN) 300 MG capsule, Take 1 capsule (300 mg total) by mouth every evening., Disp: 30 capsule, Rfl: 1    mesalamine (ASACOL) 800 mg TbEC, Take 800 mg by mouth 2 (two) times daily. , Disp: , Rfl:     milk thistle 175 mg tablet, Take 175 mg by mouth once daily., Disp: , Rfl:     multivitamin capsule, Take 1 capsule by mouth once daily., Disp: , Rfl:     sertraline (ZOLOFT) 100 MG tablet, TAKE 1 TABLET BY MOUTH TWICE DAILY, Disp: 180 tablet, Rfl: 1    TURMERIC ROOT EXTRACT ORAL, Take by mouth once daily., Disp: , Rfl:     valACYclovir (VALTREX) 1000 MG tablet, Take 1 tablet (1,000 mg total) by mouth daily as needed (outbreak)., Disp: 10 tablet, Rfl: 0    Vitals:    09/27/18 0948   BP: (!) 140/66   Pulse: 68   Weight: 69.3 kg (152 lb 12.5 oz)   Height: 5' 2" (1.575 m)   PainSc: 0-No pain       Physical Exam   Constitutional: She is oriented to person, place, and time. Vital signs are normal. She appears well-developed and well-nourished.   HENT:   Head: Normocephalic and atraumatic.   Right Ear: Hearing normal.   Left Ear: Hearing normal.   Nose: Nose normal.   Mouth/Throat: Uvula is midline.   Eyes: Conjunctivae, EOM and lids are normal. Pupils are equal, round, and reactive to light.   Neck: Trachea normal, normal range of motion, full passive range of motion without pain and phonation normal. Neck supple.   Cardiovascular: Normal rate, regular rhythm, intact distal pulses and normal pulses.   Pulmonary/Chest: Effort normal and breath sounds normal.   Abdominal: Soft. Normal appearance.   Musculoskeletal: Normal range of motion.   Feet:   Right Foot:   Protective Sensation: 4 sites tested. 4 sites sensed.   Skin Integrity: Negative for ulcer.   Left Foot:   Protective Sensation: 4 sites tested. 4 sites sensed.   Skin Integrity: Negative for ulcer.   Neurological: She is alert and oriented to person, place, " and time. She has normal strength and normal reflexes. No cranial nerve deficit or sensory deficit. She has a normal Finger-Nose-Finger Test, a normal Heel to Shin Test, a normal Romberg Test and a normal Tandem Gait Test. She displays a negative Romberg sign. Gait normal.   Reflex Scores:       Tricep reflexes are 2+ on the right side and 2+ on the left side.       Bicep reflexes are 2+ on the right side and 2+ on the left side.       Brachioradialis reflexes are 2+ on the right side and 2+ on the left side.       Patellar reflexes are 2+ on the right side and 2+ on the left side.       Achilles reflexes are 2+ on the right side and 2+ on the left side.  Skin: Skin is warm, dry and intact. Capillary refill takes less than 2 seconds.   Psychiatric: She has a normal mood and affect. Her speech is normal and behavior is normal. Judgment and thought content normal. Cognition and memory are normal.   Nursing note and vitals reviewed.      Neurologic Exam     Mental Status   Oriented to person, place, and time.   Follows 3 step commands.   Attention: normal. Concentration: normal.   Speech: speech is normal   Level of consciousness: alert  Knowledge: good.   Able to name object.     Cranial Nerves     CN II   Visual fields full to confrontation.   Visual acuity: normal  Right visual field deficit: none  Left visual field deficit: none     CN III, IV, VI   Pupils are equal, round, and reactive to light.  Extraocular motions are normal.   CN III: no CN III palsy  CN VI: no CN VI palsy    CN V   Facial sensation intact.   Right facial sensation deficit: none  Left facial sensation deficit: none    CN VII   Facial expression full, symmetric.   Right facial weakness: none  Left facial weakness: none    CN VIII   CN VIII normal.   Hearing: intact    CN IX, X   CN IX normal.   CN X normal.     CN XI   CN XI normal.     CN XII   CN XII normal.     Motor Exam   Muscle bulk: normal  Overall muscle tone: normal  Right arm tone:  normal  Left arm tone: normal  Right arm pronator drift: absent  Left arm pronator drift: absent  Right leg tone: normal  Left leg tone: normal    Strength   Strength 5/5 throughout.   Right neck flexion: 5/5  Left neck flexion: 5/5  Right neck extension: 5/5  Left neck extension: 5/5  Right deltoid: 5/5  Left deltoid: 5/5  Right biceps: 5/5  Left biceps: 5/5  Right triceps: 5/5  Left triceps: 5/5  Right wrist flexion: 5/5  Left wrist flexion: 5/5  Right wrist extension: 5/5  Left wrist extension: 5/5  Right interossei: 5/5  Left interossei: 5/5  Right abdominals: 5/5  Left abdominals: 5/5  Right iliopsoas: 5/5  Left iliopsoas: 5/5  Right quadriceps: 5/5  Left quadriceps: 5/5  Right hamstrin/5  Left hamstrin/5  Right glutei: 5/5  Left glutei: 5/5  Right anterior tibial: 5/5  Left anterior tibial: 5/5  Right posterior tibial: 5/5  Left posterior tibial: 5/5  Right peroneal: 5/5  Left peroneal: 5/5  Right gastroc: 5/5  Left gastroc: 5/5    Sensory Exam   Light touch normal.   Right arm light touch: normal  Left arm light touch: normal  Right leg light touch: normal  Left leg light touch: normal  Vibration normal.     Gait, Coordination, and Reflexes     Gait  Gait: normal    Coordination   Romberg: negative  Finger to nose coordination: normal  Heel to shin coordination: normal  Tandem walking coordination: normal    Tremor   Resting tremor: absent  Intention tremor: absent  Action tremor: absent    Reflexes   Right brachioradialis: 2+  Left brachioradialis: 2+  Right biceps: 2+  Left biceps: 2+  Right triceps: 2+  Left triceps: 2+  Right patellar: 2+  Left patellar: 2+  Right achilles: 2+  Left achilles: 2+  Right : 2+  Left : 2+  Right plantar: normal  Left plantar: normal  Right Martin: absent  Left Martin: absent  Right ankle clonus: absent  Left ankle clonus: absent        Visit Diagnosis:  Cervical stenosis of spinal canal    DDD (degenerative disc disease), cervical    Cord compression  myelopathy    Depression, unspecified depression type    Osteopenia, unspecified location        Provider dictation:  Oswestry score: 18%  PHQ:  10      The patient is a very pleasant right-hand-dominant 73-year-old  female presenting today after being referred by Dr. Page in Neurology for cervical stenosis.  She has a past medical history which includes depressed in reactive airway disease, genital herpes, ulcerative pan colitis, fatty liver disease, gout.  She reports she only has mild neck pain but approximately 1 year ago she was unable to raise her left arm or use her left arm and after several sessions of physical therapy that has resolved.  She does report numbness in her right hand in the index middle ring and small finger and occasionally mild left hand numbness.  She does have a few falls that she attributes to regular trips and falls not weakness.  She does feeling when she walks she drifts to the left.  She did have a urinary tract infection was treated with clindamycin and felt that some of the numbness in her hands improved.  She does suffer with mild constipation but she denies any urinary retention or incontinence.  Her main concern is that she is unable to look up without having neck pain or feeling faint.    On physical exam, she is a healthy appearing female.  She has diminished vibratory sense at the left medial malleolus upper extremity vibratory sense is intact.  She has a positive Martin's on the left.  She has 3+ bilateral patellar reflexes but 2+ muscle stretch reflexes throughout.  Her strength is normal.  Her gait and station are normal.    MRI of the cervical spine dated 09/14/2018 independently reviewed.  There is loss of normal lordotic curvature of the cervical spine.  There is spondylosis throughout the cervical spine.  There is a broad posterior disc hernia at C5-C6 causing mild central canal stenosis and mild bilateral foraminal stenosis at C5-C6.  At C6-C7 there is a  right paracentral disc herniation causing moderate to severe central canal stenosis there is flattening of the ventral cord.  There is right worse than left foraminal stenosis    I had an extensive conversation with the patient regarding her stenosis.  Her main concern is she is unable to look up with pain and syncopal episodes.  At this time I have discussed the case with Dr. Caro who has recommended a C5-C7 ACDF for decompression.  We hope that this will help some of her symptoms.  I have discussed the surgery in detail with the patient she is ready to proceed.  She will contact us with questions or concerns.            This note was done using voice recognition software. Please excuse any errors missed in proof reading.

## 2018-10-05 NOTE — TELEPHONE ENCOUNTER
APPT MADE 10-11-18.  
No answer, busy signal  
Tried to reach pt. No answer, left msg to call back.    [facility]. Pt needs preop clearance needed for Sx on 10-.p    
Zara Ann will be having surgery on 10.22.18 with Dr. Caro, Neurosurgeon, at [facility]. We are reaching out to obtain a surgical clearance from Dr. Ashlie Adam to ensure the safety of our patient. The surgery is C5-C7 ACDF and will be under general anesthesia. This procedure typically lasts approximately two hours. We request that the patient hold all anticoagulant/antiinflammatory medications for 5-7 days prior to surgery. Please let us know if our office can be of further assistance.     Thank you,     Tato Aguila RN  P: [phone]  F: [phone]  
yes

## 2018-10-10 ENCOUNTER — OFFICE VISIT (OUTPATIENT)
Dept: FAMILY MEDICINE | Facility: CLINIC | Age: 73
End: 2018-10-10
Payer: MEDICARE

## 2018-10-10 VITALS
WEIGHT: 151.13 LBS | SYSTOLIC BLOOD PRESSURE: 124 MMHG | HEIGHT: 62 IN | DIASTOLIC BLOOD PRESSURE: 84 MMHG | HEART RATE: 72 BPM | BODY MASS INDEX: 27.81 KG/M2

## 2018-10-10 DIAGNOSIS — R20.0 NUMBNESS AND TINGLING OF UPPER AND LOWER EXTREMITIES OF BOTH SIDES: ICD-10-CM

## 2018-10-10 DIAGNOSIS — K51.00 ULCERATIVE PANCOLITIS WITHOUT COMPLICATION: ICD-10-CM

## 2018-10-10 DIAGNOSIS — Z01.818 PREOP EXAMINATION: Primary | ICD-10-CM

## 2018-10-10 DIAGNOSIS — R16.1 SPLEEN ENLARGED: ICD-10-CM

## 2018-10-10 DIAGNOSIS — R20.2 NUMBNESS AND TINGLING OF UPPER AND LOWER EXTREMITIES OF BOTH SIDES: ICD-10-CM

## 2018-10-10 PROCEDURE — 99213 OFFICE O/P EST LOW 20 MIN: CPT | Mod: PBBFAC,PN | Performed by: FAMILY MEDICINE

## 2018-10-10 PROCEDURE — 99999 PR PBB SHADOW E&M-EST. PATIENT-LVL III: CPT | Mod: PBBFAC,,, | Performed by: FAMILY MEDICINE

## 2018-10-10 PROCEDURE — 99214 OFFICE O/P EST MOD 30 MIN: CPT | Mod: S$PBB,,, | Performed by: FAMILY MEDICINE

## 2018-10-10 PROCEDURE — 99499 UNLISTED E&M SERVICE: CPT | Mod: S$GLB,,, | Performed by: FAMILY MEDICINE

## 2018-10-10 PROCEDURE — 1101F PT FALLS ASSESS-DOCD LE1/YR: CPT | Mod: CPTII,,, | Performed by: FAMILY MEDICINE

## 2018-10-10 NOTE — PROGRESS NOTES
Subjective:       Patient ID: Zara Ann is a 73 y.o. female.    Chief Complaint: Pre-op Exam (pt is scheduled for 10/22 for neck surgery with Dr. Caro. )      Zara Ann is in the office for preop.  Scheduled for neck surg with leyla on 10/22.     HPI  Since LOV, had dx of melanoma to lesion on L arm. Planning mohs after her neck surg.  Past Medical History:   Diagnosis Date    Cancer     left arm skin    Depression     Diverticulosis     Fatty liver     Genital herpes     GERD (gastroesophageal reflux disease)     Gout     History of use of hearing aid in left ear     Osteopenia     RAD (reactive airway disease)     Rheumatic fever     UC (ulcerative colitis)          Current Outpatient Medications:     acyclovir 5% (ZOVIRAX) 5 % ointment, Apply topically 5 (five) times daily. (Patient taking differently: Apply topically 5 (five) times daily. ), Disp: 15 g, Rfl: 1    ascorbic acid (VITAMIN C) 500 MG tablet, Take 500 mg by mouth once daily., Disp: , Rfl:     aspirin (ECOTRIN) 81 MG EC tablet, Take 81 mg by mouth every evening. , Disp: , Rfl:     calcium-vitamin D3 500 mg(1,250mg) -200 unit per tablet, Take 1 tablet by mouth once daily. , Disp: , Rfl:     conjugated estrogens (PREMARIN) vaginal cream, Place 0.5 g vaginally 3 (three) times a week., Disp: 30 g, Rfl: 3    gabapentin (NEURONTIN) 300 MG capsule, Take 1 capsule (300 mg total) by mouth every evening. (Patient taking differently: Take 300 mg by mouth nightly as needed. ), Disp: 30 capsule, Rfl: 1    mesalamine (ASACOL) 800 mg TbEC, Take 800 mg by mouth 2 (two) times daily. , Disp: , Rfl:     milk thistle 175 mg tablet, Take 175 mg by mouth 2 (two) times daily. , Disp: , Rfl:     multivitamin capsule, Take 1 capsule by mouth once daily., Disp: , Rfl:     sertraline (ZOLOFT) 100 MG tablet, TAKE 1 TABLET BY MOUTH TWICE DAILY, Disp: 180 tablet, Rfl: 1    [START ON 10/20/2018] chlorhexidine (PERIDEX) 0.12 % solution, Use as  directed 10 mLs in the mouth or throat 2 (two) times daily., Disp: , Rfl:     cyanocobalamin, vitamin B-12, (VITAMIN B-12) 50 mcg tablet, Take 50 mcg by mouth once daily., Disp: , Rfl:     fish oil-omega-3 fatty acids 300-1,000 mg capsule, Take by mouth every evening. , Disp: , Rfl:     FLAXSEED OIL ORAL, Take by mouth once daily., Disp: , Rfl:     [START ON 10/20/2018] mupirocin (BACTROBAN) 2 % ointment, 1 g by Nasal route 2 (two) times daily., Disp: , Rfl:     TURMERIC ROOT EXTRACT ORAL, Take by mouth every evening. , Disp: , Rfl:     valACYclovir (VALTREX) 1000 MG tablet, Take 1 tablet (1,000 mg total) by mouth daily as needed (outbreak)., Disp: 10 tablet, Rfl: 0    The 10-year ASCVD risk score (Claudiamarianne MEDINA Jr., et al., 2013) is: 10.1%    Values used to calculate the score:      Age: 73 years      Sex: Female      Is Non- : No      Diabetic: No      Tobacco smoker: No      Systolic Blood Pressure: 124 mmHg      Is BP treated: No      HDL Cholesterol: 44 mg/dL      Total Cholesterol: 185 mg/dL   Pt holding asa in anticipation of surgery.    Lab Results   Component Value Date    HGBA1C 4.6 06/13/2018     Lab Results   Component Value Date    LDLCALC 110.8 06/13/2018    CREATININE 0.66 10/09/2018   labs 6/2018 rev. preop lab/ekg/cxr pending.    Review of Systems   Constitutional: Negative for fatigue, fever and unexpected weight change.   HENT: Negative for congestion, dental problem, postnasal drip, rhinorrhea, sore throat and trouble swallowing.    Respiratory: Negative for cough and shortness of breath.    Cardiovascular: Negative for chest pain, palpitations and leg swelling.   Gastrointestinal: Negative for constipation, diarrhea and nausea.   Genitourinary: Negative for difficulty urinating and dysuria.   Musculoskeletal: Negative for arthralgias and back pain.   Skin: Negative for color change and rash.   Neurological: Negative for dizziness and headaches.   Psychiatric/Behavioral:  Positive for sleep disturbance. Negative for dysphoric mood (doing ok but not great).           Objective:      Physical Exam   Constitutional: She is oriented to person, place, and time. She appears well-developed and well-nourished. No distress.   HENT:   Head: Normocephalic and atraumatic.   Right Ear: External ear normal.   Left Ear: External ear normal.   Nose: Nose normal.   Mouth/Throat: Oropharynx is clear and moist. No oropharyngeal exudate.   Eyes: Conjunctivae and EOM are normal. Pupils are equal, round, and reactive to light.   Neck: Normal range of motion. Neck supple. No thyromegaly present.   Cardiovascular: Normal rate and regular rhythm.   Pulmonary/Chest: Effort normal and breath sounds normal. No respiratory distress. She has no wheezes.   Abdominal: Soft. Bowel sounds are normal. She exhibits no distension and no mass. There is no tenderness. There is no rebound and no guarding.   Musculoskeletal: Normal range of motion. She exhibits no edema.   Lymphadenopathy:     She has no cervical adenopathy.   Neurological: She is alert and oriented to person, place, and time. No cranial nerve deficit.   Skin: Skin is warm and dry.   Psychiatric: She has a normal mood and affect. Her behavior is normal.   Nursing note and vitals reviewed.      Screening recommendations appropriate to age and health status were reviewed.    Preop examination  Low risk by rcri pending lab/cxr/ekg.  Ulcerative pancolitis without complication  Per gi. Up to date on cscope.  Spleen enlarged  -     US Abdomen Complete; Future; Expected date: 04/10/2019  Incidental finding on previous. Update for review.  Numbness and tingling of upper and lower extremities of both sides  Per surg.

## 2018-10-15 ENCOUNTER — TELEPHONE (OUTPATIENT)
Dept: NEUROLOGY | Facility: CLINIC | Age: 73
End: 2018-10-15

## 2018-10-19 ENCOUNTER — TELEPHONE (OUTPATIENT)
Dept: NEUROSURGERY | Facility: CLINIC | Age: 73
End: 2018-10-19

## 2018-10-19 NOTE — TELEPHONE ENCOUNTER
----- Message from Lali Golden sent at 10/19/2018  3:14 PM CDT -----  Contact: Patient  Type:  Patient Returning Call    Who Called:  Patient  Who Left Message for Patient:  João Song  Does the patient know what this is regarding?:  Surgery  Best Call Back Number:  [phone]  Additional Information:  NA

## 2018-10-19 NOTE — TELEPHONE ENCOUNTER
Pt reports that she has no questions and did not call today; informed pt that I received a message re: questions about surgery; pt proceeded to ask questions about surgery and some answers given otherwise pt instructed to inquire while in preop before surgery; pt requested prayers and then verbalized understanding.

## 2018-10-22 PROBLEM — G95.20 CORD COMPRESSION MYELOPATHY: Status: ACTIVE | Noted: 2018-10-22

## 2018-10-22 PROBLEM — M50.30 DEGENERATION OF CERVICAL INTERVERTEBRAL DISC: Status: ACTIVE | Noted: 2018-10-22

## 2018-10-26 ENCOUNTER — TELEPHONE (OUTPATIENT)
Dept: NEUROSURGERY | Facility: CLINIC | Age: 73
End: 2018-10-26

## 2018-10-26 NOTE — TELEPHONE ENCOUNTER
"Pt called to inform the clinic that her prescribed ultram was making her nauseous so she took a some tylenol and benadryl instead; pt instructed to call clinic if pain did not subside with that medication; pt also reports that she has to sleep with her mouth open bc when she closes her mouth she "feels flapping in the throat"; pt notified that she was probably a little swollen from the surgery but instructed to call our clinic if that did not subside by next week; pt verbalized understanding.   "

## 2018-10-31 ENCOUNTER — TELEPHONE (OUTPATIENT)
Dept: NEUROSURGERY | Facility: CLINIC | Age: 73
End: 2018-10-31

## 2018-11-01 ENCOUNTER — TELEPHONE (OUTPATIENT)
Dept: NEUROSURGERY | Facility: CLINIC | Age: 73
End: 2018-11-01

## 2018-11-01 NOTE — TELEPHONE ENCOUNTER
Spoke with pt and notified her that she had a 1pm wound check appt with the nurse here in the NSGY clinic; pt reported that she believed it was tmw; r/s pt appt for wound check for tmw and time/location confirmed; verbalized understanding.

## 2018-11-02 ENCOUNTER — CLINICAL SUPPORT (OUTPATIENT)
Dept: NEUROSURGERY | Facility: CLINIC | Age: 73
End: 2018-11-02
Payer: MEDICARE

## 2018-11-02 VITALS — DIASTOLIC BLOOD PRESSURE: 81 MMHG | SYSTOLIC BLOOD PRESSURE: 138 MMHG | TEMPERATURE: 98 F | HEART RATE: 83 BPM

## 2018-11-02 PROCEDURE — 99999 PR PBB SHADOW E&M-EST. PATIENT-LVL II: CPT | Mod: PBBFAC,,,

## 2018-11-02 NOTE — PROGRESS NOTES
Pt arrived to wound check appt with c collar in place and steri strips to wound to anterior neck; steri strips removed and wound cleansed with chlorhexidine; pt tolerated well without any discomfort; pt reminded of instructions with neck brace and rules with lifting still apply; pt and spouse verbalized understanding; future appts discussed; pt and spouse aware of date/times/locations.

## 2018-11-10 NOTE — PROGRESS NOTES
ALLERGIES:  Ciprofloxacin; Clindamycin; Nitrofurantoin monohyd/m-cryst; Penicillins; Sulfa (sulfonamide antibiotics); Cephalosporins; Hydrocod-cpm-pe-acetaminophen; and Hydrocortisone  -------------------------------------------------------------  PROCEDURE: Excision of a melanoma in situ, with suture closure    SITE: left arm    INDICATION: melanoma in situ     PRIOR PATHOLOGY:   FINAL PATHOLOGIC DIAGNOSIS  1. Skin, left arm, shave biopsy:  - EVOLVING MALIGNANT MELANOMA IN SITU, ARISING IN ASSOCIATION WITH A DYSPLASTIC COMPOUND  NEVUS.  - TUMOR EXTENDS TO THE LATERAL MARGIN.  MICROSCOPIC DESCRIPTION: Sections show a proliferation of variably atypical junctional melanocytes consisting  of rare small nests and prominent single cells, exhibiting confluent growth along the dermoepidermal junction and  pagetoid spread. There is a small and well circumscribed dermal component to this melanocytic proliferation  consisting of nests and cords of melanocytes within the dermis showing architectural symmetry, maturation, and no  cytological atypia. Mart 1 immunohistochemical stain highlights the compound melanocytic proliferation, including  areas of confluent growth of melanocytes along the dermoepidermal junction and pagetoid spread. HMB-45  exhibits similar though somewhat sparser staining within the junctional component of the lesion. It fails to stain the  dermal component of the lesion. Ki-67 shows a negligible proliferative index within the dermal component of the  lesion. Appropriately reactive controls were reviewed.  Diagnosed by: Eileen Chirinos M.D.  (Electronically Signed: 2018-09-11 16:22:29)    ANESTHETIC: 12 mL 1% Lidocaine with Epinephrine 1:100,000    SURGICAL PREP: Ethanol and Hibiclens    SURGEON: Mikey May MD    ASSISTANTS: Nikhil Benton CST     PREOPERATIVE DIAGNOSIS: as above    POSTOPERATIVE DIAGNOSIS: as above    HEMOSTASIS: Hyfrecation     INITIAL LESION+MARGINS SIZE: 2.4 x 2.4 cm    LENGTH  OF FINAL CLOSURE: 7.4 cm  ?  NARRATIVE:    INDICATIONS:  The patient is a 73 y.o. female with a biopsy-proven melanoma in situ, as noted above, on the left arm. In light of the nature of this lesion, excisional surgery for margin evaluation and definitive treatment was thought to be the most appropriate management choice. I discussed it with the patient and she fully understands the aims, risks, alternatives, and possible complications, and elects to proceed. There are no medical or surgical contraindications to the procedure.    A signed informed consent was obtained.    PROCEDURE:  The patient was placed in the supine position on the operating table in the Surgery Suite with her arm resting on a arm board . The area was thoroughly prepared with ethanol and Hibiclens. A sterile surgical marker was used to outline the clinical margins of the lesion and appropriate 7+mm surgical excision margins. The resulting area to be excised measured 2.4 x 2.4 cm in size. Triangles of additional skin to be excised were then outlined on either side of the site, to create a fusiform-shaped area to be excised, in order to avoid the creation of standing cone deformities. Local anesthesia of 1% Lidocaine with 1:100,000 epinephrine was administered via injection. A total of 12 mL was used throughout the entire procedure. The area was draped in the standard manner. A fusiform excision was carried out to include the lesion and the previously marked excisional margins. Hemostasis was achieved by hyfrecation of bleeding points in the wound. The specimen was tagged with suture: long tag posterior/lateral tip, short tag superior/proximal side. The specimen was placed in a container of formalin to be submitted to pathology.    Repair via Intermediate (Layered) Closure:  Because of the size, shape and location of the defect, simple closure could not be achieved without excessive tension on the wound margins and an unacceptable risk of wound  dehiscence. The fusiform defect resulting from the excision measured 2.4 x 7.4 cm in size.  Wound margins were minimally undermined to allow closure with minimal tension. After hemostasis was achieved with the hyfrecator, closure was accomplished in layered fashion with multiple #3-0 Vicryl buried interrupted sutures to close the subcutaneous tissues and deep dermis and multiple #3-0 Prolene simple interrupted vertical mattress suture(s) for final approximation of the wound margins.    Final dressing consisted of petrolatum, Telfa, and tape.    Estimated blood loss for the total procedure was less than 5 mL.    Written and verbal wound care instructions were reviewed with the patient who expressed understanding.  She tolerated the procedure well and left the procedure room in good condition; she is to return in 14 days for suture removal.    The patient is to call prn with any questions or problems which arise.

## 2018-11-12 ENCOUNTER — PROCEDURE VISIT (OUTPATIENT)
Dept: DERMATOLOGY | Facility: CLINIC | Age: 73
End: 2018-11-12
Payer: MEDICARE

## 2018-11-12 VITALS
HEART RATE: 88 BPM | SYSTOLIC BLOOD PRESSURE: 140 MMHG | HEIGHT: 62 IN | DIASTOLIC BLOOD PRESSURE: 78 MMHG | BODY MASS INDEX: 26.31 KG/M2 | WEIGHT: 143 LBS

## 2018-11-12 DIAGNOSIS — D03.62 MELANOMA IN SITU OF LEFT UPPER ARM: Primary | ICD-10-CM

## 2018-11-12 PROCEDURE — 12032 INTMD RPR S/A/T/EXT 2.6-7.5: CPT | Mod: S$GLB,,, | Performed by: DERMATOLOGY

## 2018-11-12 PROCEDURE — 88305 TISSUE EXAM BY PATHOLOGIST: CPT | Mod: 26,,, | Performed by: PATHOLOGY

## 2018-11-12 PROCEDURE — 88305 TISSUE EXAM BY PATHOLOGIST: CPT | Performed by: PATHOLOGY

## 2018-11-12 PROCEDURE — 99499 UNLISTED E&M SERVICE: CPT | Mod: S$GLB,,, | Performed by: DERMATOLOGY

## 2018-11-12 PROCEDURE — 88342 IMHCHEM/IMCYTCHM 1ST ANTB: CPT | Mod: 26,,, | Performed by: PATHOLOGY

## 2018-11-12 PROCEDURE — 88341 IMHCHEM/IMCYTCHM EA ADD ANTB: CPT | Performed by: PATHOLOGY

## 2018-11-12 PROCEDURE — 11603 EXC TR-EXT MAL+MARG 2.1-3 CM: CPT | Mod: 51,S$GLB,, | Performed by: DERMATOLOGY

## 2018-11-13 NOTE — PROGRESS NOTES
Date of service: 9/7/2018  Referring provider: Dr. Ashlie Adam    Subjective:      Chief complaint: Neck Pain and Numbness       Patient ID: Zara Ann is a 73 y.o. lady referred for the evaluation of numbness in her hands. She is a new patient to me.     History of Present Illness    Around 6/10/18 patient woke up at 5am with profound numbness in digits 2 through 5 on the right hand (both palmar and dorsal aspects) but not the palm. She said her fingers were so numb that she could have cut them off not felt that. She had a similar but less a severe sensation on the left but estimates that digits 2 and 3 were most affected. The severe numbness lasted all day and by day 2 started to improve and continued to improve with time alone over the coming days. She sleeps in various positions  And reports no change to her usual sleep positions that night. She tends to sleep with her arms flexed. There was no associated numbness or weakness in her legs or torso, no bowel or bladder problems, no worse gait disturbance  But she does report that from time to time she gets off balance when walking. Once the feeling started to return in her hands, her hands hurt. Symptoms have not recurred.     She reports she has been numb in the soles of both feet for about 2 years.    She reports that about 15 years ago after a roller coaster ride, after a delay of 6 months, and with gradual onset, she had significant weakness (no numbness) of the left arm  To the point that she had no ability to abduct at the shoulder at all and when she wanted to move her left arm she had to pick it up with her right arm, which improved with 3 months of physical therapy.     Dr. Adam sent her for a MRI cervical spine which she had done on 06/26/2018 which showed moderate spinal canal stenosis at the C6-7 level with mild flattening of the ventral cord surface and subtle cord signal change at this level.     Review of patient's allergies indicates:  Order has been changed to [employer].  Notified Pt of this change and time and date of test.   Allergen Reactions    Ciprofloxacin Shortness Of Breath    Clindamycin Shortness Of Breath    Nitrofurantoin monohyd/m-cryst Shortness Of Breath and Anaphylaxis    Penicillins Hives    Sulfa (sulfonamide antibiotics) Hives    Cephalosporins     Hydrocod-cpm-pe-acetaminophen     Hydrocortisone Nausea Only     Current Outpatient Medications   Medication Sig Dispense Refill    acyclovir 5% (ZOVIRAX) 5 % ointment Apply topically 5 (five) times daily. 15 g 1    ascorbic acid (VITAMIN C) 500 MG tablet Take 500 mg by mouth once daily.      aspirin (ECOTRIN) 81 MG EC tablet Take 81 mg by mouth once daily.      calcium-vitamin D3 500 mg(1,250mg) -200 unit per tablet Take 1 tablet by mouth 2 (two) times daily with meals.      conjugated estrogens (PREMARIN) vaginal cream Place 0.5 g vaginally 3 (three) times a week. 30 g 3    cyanocobalamin, vitamin B-12, (VITAMIN B-12) 50 mcg tablet Take 50 mcg by mouth once daily.      gabapentin (NEURONTIN) 300 MG capsule Take 1 capsule (300 mg total) by mouth every evening. 30 capsule 1    mesalamine (ASACOL) 800 mg TbEC Take 800 mg by mouth 2 (two) times daily.       milk thistle 175 mg tablet Take 175 mg by mouth once daily.      multivitamin capsule Take 1 capsule by mouth once daily.      sertraline (ZOLOFT) 100 MG tablet TAKE ONE TABLET BY MOUTH TWICE DAILY 180 tablet 1    TURMERIC ROOT EXTRACT ORAL Take by mouth once daily.      valACYclovir (VALTREX) 1000 MG tablet Take 1 tablet (1,000 mg total) by mouth daily as needed (outbreak). 10 tablet 0     No current facility-administered medications for this visit.        Past Medical History  Past Medical History:   Diagnosis Date    Depression     Diverticulosis     Genital herpes     GERD (gastroesophageal reflux disease)     Gout     History of use of hearing aid in left ear     Osteopenia     RAD (reactive airway disease)     Rheumatic fever     UC (ulcerative colitis)        Past Surgical  History  Past Surgical History:   Procedure Laterality Date    APPENDECTOMY      CHOLECYSTECTOMY      COLONOSCOPY  2013    every 2 yrs    HYSTERECTOMY      parital - benign - fibroids    Rectocele  1983    with hysterectomy       Family History  Family History   Problem Relation Age of Onset    Cancer Mother     Cancer Father     Ulcers Sister     Pancreatitis Sister     Breast cancer Sister     Liver cancer Paternal Aunt        Social History  Social History     Socioeconomic History    Marital status:      Spouse name: Not on file    Number of children: Not on file    Years of education: Not on file    Highest education level: Not on file   Social Needs    Financial resource strain: Not on file    Food insecurity - worry: Not on file    Food insecurity - inability: Not on file    Transportation needs - medical: Not on file    Transportation needs - non-medical: Not on file   Occupational History    Not on file   Tobacco Use    Smoking status: Never Smoker    Smokeless tobacco: Never Used   Substance and Sexual Activity    Alcohol use: Yes     Comment: rarely     Drug use: No    Sexual activity: Not on file   Other Topics Concern    Not on file   Social History Narrative    Not on file        Review of Systems  14-point review of systems as follows:   No check bryce indicates NEGATIVE response   Constitutional: [] weight loss, [] change to appetite   Eyes: [] change in vision, [] double vision   Ears, nose, mouth, throat: [] frequent nose bleeds, [x] ringing in the ears   Respiratory: [] cough, [] wheezing   Cardiovascular: [] chest pain, [] palpitations   Gastrointestinal: [] jaundice, [x] nausea/vomiting   Genitourinary: [] incontinence, [] burning with urination   Hematologic/lymphatic: [x] easy bruising/bleeding, [] night sweats   Neurological: [] numbness, [] weakness   Endocrine: [] fatigue, [x] heat/cold intolerance   Allergy/Immunologic: [] fevers, [] chills    Musculoskeletal: [] muscle pain, [] joint pain   Psychiatric: [] thoughts of harming self/others, [x] depression   Integumentary: [] rashes, [] sores that do not heal     Objective:        Vitals:    09/07/18 1427   BP: 130/74   Pulse: 72   Resp: 16     Body mass index is 27.82 kg/m².    Constitutional: appears in no acute distress, well-developed, well-nourished     Eyes: normal conjunctiva, PERRLA     Ears, nose, mouth, throat: external appearance of ears and nose normal, hearing intact     Cardiovascular: regular rate and rhythm, no murmurs appreciated    Respiratory: unlabored respirations, breath sounds normal bilaterally    Gastrointestinal: no visible abdominal masses, no guarding, no visible hernia    Musculoskeletal: normal tone in all four extremities. No atrophy. No abnormal movements. Strength in all muscles groups of the upper and lower extremities is 5/5. Normal regular and tandem gait and station. Digits and nails normal.      Function Root Nerve Strength R / L    Wrist ext  C6 Radial 5/5   Wrist flex C7 Median/ulnar 5/5   Finger ext C7 Radial 5/5   Finger flex C8 Median/ulnar 5/5   Finger abduction T1 ulnar 4/4   Finger adduction  T1 ulnar 4/4   Thumb ext C7 Radial 5/5   Thumb flex C7-T1 Median/ulnar 5/5   Thumb abduction C6-7 Median 5/5   Thumb adduction C8 Ulnar 5/5   Opposition C6-8 Medial / ulnar  5/5       Psychiatric: normal judgment and insight. Oriented to person, place, and time.     Neurologic:   Cortical functions: recent and remote memory intact, normal attention span and concentration, speech fluent, adequate fund of knowledge   Cranial nerves: visual fields full, PERRLA, EOMI, facial sensation intact in V1-V3, symmetric facial strength, hearing intact to finger rub, palate elevates symmetrically, shoulder shrug 5/5, tongue protrudes midline   Reflexes:  2+ bilateral biceps, brachioradialis, triceps;  3+ right patellar, 2+ left patellar, 1+ bilateral Achilles.  Negative Luz,  negative Babinski bilaterally  Sensation:  Suspended sensory level approximately  T2/T4 level down to T8 level more prominent on the left thorax than the right.  Reduced proprioception bilateral great toes but not the hands.  Significantly reduced vibration sense left great toe.  Patchy sensory loss to both modalities in the lower extremities, most significant in the L4 dermatomes. Profound length dependent loss of sharp greater than cold sensation in the upper extremities right worse than left as below:     Cold:  Right arm (percentage of normal sensation):  C5 - 75 %  C6 - 50% proximally (forearm); 40% hand  C7 - 45%  C8 - 50%  T1 - 80%   T2 - 100%     Left arm  (percentage of normal sensations compared to the upper sternum ):  C5 - 90%  C6 - 100% forearm, 95% hand  C7 -  95%  C8 -  95%  T1 -  95%  T2 - 100%    Pinprick:   Right arm (percentage of normal sensation as compared to the upper sternum):  C5 -  50%  C6 -  40%  C7 -  20%  C8 -  30%  T1 -  30%  T2 -  40%    Left arm:   C5 -  35%  C6 -  30%  Form, 15% hand  C7 -  20%  C8 -  15%  T1 -  30%  T2 -  35%    Coordination: normal Finger-to-nose    Data Review:     I have personally reviewed the referring provider's notes, labs, & imaging made available to me today.     RADIOLOGY STUDIES:  I have personally reviewed the pertinent images performed.     06/13/2018 MRI cervical spine without contrast:  C2-3: There is very mild facet joint arthropathy.  There is a very shallow broad central disc protrusion.  There is no spinal canal or significant foraminal stenosis.    C3-4: There is bilateral uncovertebral spurring and left greater than right facet joint arthropathy.  There is no spinal stenosis.  There is mild bilateral foraminal stenosis.    C4-5: There is disc space narrowing.  There is left greater than right facet joint arthropathy.  There is also uncovertebral spurring.  There is mild left foraminal stenosis.  There is no spinal canal or right foraminal  stenosis.    C5-6: There is disc space narrowing and trace anterolisthesis.  There is right greater than left facet joint arthropathy and uncovertebral spurring.  There is a broad disc protrusion/osteophyte which narrows the subarachnoid space.  There is mild spinal stenosis without cord compression.  There is mild-to-moderate left and moderate-to-marked right foraminal stenosis.    C6-7: There is moderate to marked disc space narrowing.  There is bilateral, left greater than right, uncovertebral spurring.  There is a moderate broad disc protrusion/osteophyte which obscures the ventral subarachnoid space and results in mild flattening of the ventral cord surface.  Again, there is subtle changes of signal abnormality in the cord suggestive of mild edema or myelomalacia.  There is moderate central spinal stenosis.  There is marked left and mild-to-moderate right foraminal stenosis.    C7-T1: There is left greater than right facet joint arthropathy.  There is no spinal canal or significant foraminal stenosis.  There are small bilateral foraminal perineural cysts.    T1-2: There are small bilateral foraminal perineural cyst.  Also, there is left greater than right facet joint arthropathy.  There is no spinal canal or significant foraminal stenosis.    Lab Results   Component Value Date     06/13/2018    K 4.0 06/13/2018     06/13/2018    CO2 28 06/13/2018    BUN 11 06/13/2018    CREATININE 0.7 06/13/2018     06/13/2018    HGBA1C 4.6 06/13/2018    AST 46 (H) 06/13/2018    ALT 34 06/13/2018    ALBUMIN 3.5 06/13/2018    PROT 6.8 06/13/2018    BILITOT 0.8 06/13/2018    CHOL 185 06/13/2018    HDL 44 06/13/2018    LDLCALC 110.8 06/13/2018    TRIG 151 (H) 06/13/2018       Lab Results   Component Value Date    WBC 4.35 06/13/2018    HGB 12.5 06/13/2018    HCT 38.6 06/13/2018    MCV 97 06/13/2018     06/13/2018       Lab Results   Component Value Date    TSH 1.684 06/13/2018       Assessment & Plan:        Problem List Items Addressed This Visit        Other    Numbness and tingling of upper and lower extremities of both sides - Primary    Relevant Orders    MRI Cervical Spine W WO Cont    MRI Thoracic Spine W WO Cont    EMG W/ ULTRASOUND AND NERVE CONDUCTION TEST 2 Extremities    EMG W/ ULTRASOUND AND NERVE CONDUCTION TEST 2 Extremities    Creatinine, serum              Patient presents for evaluation of sudden onset numbness in her hands occurring 06/10/2018 after waking up resolved after a couple of days. Only previous neurologic history involved profound weakness but not numbness of the left arm approximately 15 years ago which resolved with physical therapy.  Cervical imaging was pursued which showed moderate spinal canal stenosis of the degenerative type at C6-7 with mild cord signal change at this level.   While she reports no further problems her physical exam is actually profound showing a significant sensory loss to painful more than temperature sensation in a length-dependent pattern affecting the arms more than the legs bilaterally.  In addition there is hyperreflexia at the right knee as well as impaired vibration and proprioception at the toes.  There is also a suspended sensory level in the midthoracic dermatomes more so on the left than the right.  There is subtle weakness of finger abduction  and abduction bilaterally. This exam is quite profound and more than I would expect for the small lesion she has in the cervical cord.  I am not sure whether this exam represents a severe peripheral neuropathy versus of myelopathy.  For peripheral neuropathy to be this significant I would expect loss of reflexes as well as greater patient awareness of numbness.  For a myelopathy to be this significant I would expect more extensive hyper-reflexia as well as pathological reflexes in the hands and feet.   I do think this deserves further workup and I would like to get a contrasted cervical MRI to see if that cord  signal enhances as well as look at her thoracic spine to see if there any other lesions in case this is an inflammatory lesion rather than a degenerative myelopathy.   Just by history alone significant numbness in the hands upon waking up in a person who sleeps with flexed arms sounds most like a transient ulnar neuropathy which is supported by subtle weakness of finger abduction and abduction but this would never cause the remaining sensory abnormalities.     After the testing described below is finished I will get in touch with the patient on further plan which may include serologies for peripheral neuropathy, lumbar puncture for inflammatory causes of myelopathy, or neurosurgical referral for structural causes of myelopathy.    TESTING:  -- repeat cervical MRI with contrast to learn if the white spot on your cord is new or old (we MAY be able to show this with contrast)  -- get a thoracic spine MRI with contrast to look for other spots  -- get a nerve conduction study of all 4 limbs to look for a peripheral neuropathy and/or multiple compression neuropathies     PREVENTION OF PAIN:   -- none at this time     AS-NEEDED TREATMENT OF PAIN:  -- none at this time    No Follow-up on file.  Based on results of workup    Kallie Page MD     9/14/18 addendum:    Reviewed imaging with neurosurgeon Dr. Caro who interpreted as showing dilatation of central canal at and above the stenosis described above.  He would consider decompression anterior fusion.  The workup above was not able to be scheduled yet.  This morning I reached out to the patient and discussed Dr. Caro's assessment and I recommended that she have a consultation with him to discuss the findings.  She said that she was recommended to have cervical injections prior to considering surgery.  I explained to her that this type of problem we are discussing is not amenable to improvement with epidural steroids.  She also asked if physical therapy could help.  I  again explained this is not a problem that is amenable to improvement with physical therapy.  I would like her to have the above workup done prior to seeing him so we will make arrangements to expedite this.  Patient also reported that her sister had some type of imaging study done after car accident and felt hot during contrast administration and then has not been right since.  I explained that the allergen a contrast tends to be with CT scans not MRIs and if she has no demonstrated gadolinium allergy in the past I still would recommend that these studies be done with gadolinium to exclude an unrelated inflammatory process in her spine.  All questions answered.    Kallie Page MD

## 2018-11-16 ENCOUNTER — TELEPHONE (OUTPATIENT)
Dept: DERMATOLOGY | Facility: CLINIC | Age: 73
End: 2018-11-16

## 2018-11-16 NOTE — TELEPHONE ENCOUNTER
----- Message from Chantal Susan sent at 11/15/2018  4:28 PM CST -----  Contact: pt at [phone]  [facility] pt-pt states that [facility]  Will take her sutures out on Nov. 27 in the afternoon.  Not on Nov. 26.   Needs to be changed please.  States that she has his private number also.  Has spoken to him.    patient called and rescheduled appt

## 2018-11-27 ENCOUNTER — OFFICE VISIT (OUTPATIENT)
Dept: DERMATOLOGY | Facility: CLINIC | Age: 73
End: 2018-11-27
Payer: MEDICARE

## 2018-11-27 DIAGNOSIS — Z48.02 VISIT FOR SUTURE REMOVAL: Primary | ICD-10-CM

## 2018-11-27 PROCEDURE — 99024 POSTOP FOLLOW-UP VISIT: CPT | Mod: S$GLB,,, | Performed by: DERMATOLOGY

## 2018-11-27 PROCEDURE — 99999 PR PBB SHADOW E&M-EST. PATIENT-LVL I: CPT | Mod: PBBFAC,,, | Performed by: DERMATOLOGY

## 2018-11-28 ENCOUNTER — HOSPITAL ENCOUNTER (OUTPATIENT)
Dept: RADIOLOGY | Facility: HOSPITAL | Age: 73
Discharge: HOME | End: 2018-11-28
Attending: PHYSICIAN ASSISTANT
Payer: MEDICARE

## 2018-11-28 ENCOUNTER — OFFICE VISIT (OUTPATIENT)
Dept: NEUROSURGERY | Facility: CLINIC | Age: 73
End: 2018-11-28
Payer: MEDICARE

## 2018-11-28 VITALS
HEIGHT: 62 IN | DIASTOLIC BLOOD PRESSURE: 61 MMHG | HEART RATE: 66 BPM | SYSTOLIC BLOOD PRESSURE: 120 MMHG | WEIGHT: 146.94 LBS | BODY MASS INDEX: 27.04 KG/M2 | TEMPERATURE: 98 F

## 2018-11-28 DIAGNOSIS — Z98.1 S/P CERVICAL SPINAL FUSION: Primary | ICD-10-CM

## 2018-11-28 DIAGNOSIS — G95.20 CORD COMPRESSION MYELOPATHY: ICD-10-CM

## 2018-11-28 PROCEDURE — 99024 POSTOP FOLLOW-UP VISIT: CPT | Mod: S$GLB,,, | Performed by: PHYSICIAN ASSISTANT

## 2018-11-28 PROCEDURE — 72040 X-RAY EXAM NECK SPINE 2-3 VW: CPT | Mod: 26,,, | Performed by: RADIOLOGY

## 2018-11-28 PROCEDURE — 72040 X-RAY EXAM NECK SPINE 2-3 VW: CPT | Mod: TC,FY,PO

## 2018-11-28 PROCEDURE — 99999 PR PBB SHADOW E&M-EST. PATIENT-LVL IV: CPT | Mod: PBBFAC,,, | Performed by: PHYSICIAN ASSISTANT

## 2018-12-04 NOTE — PROGRESS NOTES
Neurosurgery History & Physical    Patient ID: Zara Ann is a 73 y.o. female.    Chief Complaint   Patient presents with    Post-op Evaluation     4 week po C5-7 ACDF. Patient reports pain 2/10       Review of Systems   Constitutional: Negative for activity change, chills, fever and unexpected weight change.   HENT: Negative for hearing loss, tinnitus, trouble swallowing and voice change.    Eyes: Negative.  Negative for visual disturbance.   Respiratory: Negative for apnea, chest tightness and shortness of breath.    Cardiovascular: Negative.  Negative for chest pain and palpitations.   Gastrointestinal: Positive for constipation. Negative for abdominal pain, diarrhea, nausea and vomiting.   Genitourinary: Negative.  Negative for difficulty urinating, dysuria and frequency.   Musculoskeletal: Positive for neck pain and neck stiffness. Negative for back pain and gait problem.   Skin: Negative for wound.   Allergic/Immunologic: Negative for immunocompromised state.   Neurological: Positive for numbness. Negative for dizziness, tremors, seizures, facial asymmetry, speech difficulty, weakness, light-headedness and headaches.   Psychiatric/Behavioral: Negative for confusion and decreased concentration.       Past Medical History:   Diagnosis Date    Cancer     left arm skin    Depression     Diverticulosis     Fatty liver     Genital herpes     GERD (gastroesophageal reflux disease)     Gout     History of use of hearing aid in left ear     Osteopenia     RAD (reactive airway disease)     Rheumatic fever     UC (ulcerative colitis)      Past Surgical History:   Procedure Laterality Date    APPENDECTOMY      CHOLECYSTECTOMY      COLONOSCOPY  2013    every 2 yrs    DECOMPRESSION AND FUSION, SPINE, CERVICAL, ANTERIOR APPROACH  C5-C7 ANTERIOR CERVICAL DECOMPRESSION AND FUSION N/A 10/22/2018    Performed by Demetri Caro MD at [facility] OR    DECOMPRESSION OF CERVICAL SPINE BY ANTERIOR APPROACH  WITH FUSION N/A 10/22/2018    Procedure: DECOMPRESSION AND FUSION, SPINE, CERVICAL, ANTERIOR APPROACH  C5-C7 ANTERIOR CERVICAL DECOMPRESSION AND FUSION;  Surgeon: Demetri Caro MD;  Location: [facility] OR;  Service: Neurosurgery;  Laterality: N/A;    HYSTERECTOMY      parital - benign - fibroids    Rectocele  1983    with hysterectomy     Social History     Socioeconomic History    Marital status:      Spouse name: Not on file    Number of children: Not on file    Years of education: Not on file    Highest education level: Not on file   Social Needs    Financial resource strain: Not on file    Food insecurity - worry: Not on file    Food insecurity - inability: Not on file    Transportation needs - medical: Not on file    Transportation needs - non-medical: Not on file   Occupational History    Not on file   Tobacco Use    Smoking status: Never Smoker    Smokeless tobacco: Never Used   Substance and Sexual Activity    Alcohol use: Yes     Comment: rarely     Drug use: No    Sexual activity: Not on file   Other Topics Concern    Not on file   Social History Narrative    Not on file     Family History   Problem Relation Age of Onset    Cancer Mother         colon    Cancer Father         kidney     Ulcers Sister     Pancreatitis Sister     Breast cancer Sister     Liver cancer Paternal Aunt      Review of patient's allergies indicates:   Allergen Reactions    Ciprofloxacin Shortness Of Breath    Clindamycin Shortness Of Breath    Nitrofurantoin monohyd/m-cryst Shortness Of Breath and Anaphylaxis    Penicillins Hives    Sulfa (sulfonamide antibiotics) Hives    Cephalosporins     Hydrocod-cpm-pe-acetaminophen     Hydrocortisone Nausea Only       Current Outpatient Medications:     acyclovir 5% (ZOVIRAX) 5 % ointment, Apply topically 5 (five) times daily. (Patient taking differently: Apply topically 5 (five) times daily. ), Disp: 15 g, Rfl: 1    ascorbic acid (VITAMIN C) 500 MG  "tablet, Take 500 mg by mouth once daily., Disp: , Rfl:     aspirin (ECOTRIN) 81 MG EC tablet, Take 1 tablet (81 mg total) by mouth every evening., Disp: , Rfl: 0    calcium-vitamin D3 500 mg(1,250mg) -200 unit per tablet, Take 1 tablet by mouth once daily. , Disp: , Rfl:     conjugated estrogens (PREMARIN) vaginal cream, Place 0.5 g vaginally 3 (three) times a week., Disp: 30 g, Rfl: 3    cyanocobalamin, vitamin B-12, (VITAMIN B-12) 50 mcg tablet, Take 50 mcg by mouth once daily., Disp: , Rfl:     fish oil-omega-3 fatty acids 300-1,000 mg capsule, Take by mouth every evening. , Disp: , Rfl:     FLAXSEED OIL ORAL, Take by mouth once daily., Disp: , Rfl:     gabapentin (NEURONTIN) 300 MG capsule, Take 1 capsule (300 mg total) by mouth every evening. (Patient taking differently: Take 300 mg by mouth nightly as needed. ), Disp: 30 capsule, Rfl: 1    mesalamine (ASACOL) 800 mg TbEC, Take 800 mg by mouth 2 (two) times daily. , Disp: , Rfl:     milk thistle 175 mg tablet, Take 175 mg by mouth 2 (two) times daily. , Disp: , Rfl:     multivitamin capsule, Take 1 capsule by mouth once daily., Disp: , Rfl:     sertraline (ZOLOFT) 100 MG tablet, TAKE 1 TABLET BY MOUTH TWICE DAILY, Disp: 180 tablet, Rfl: 1    TURMERIC ROOT EXTRACT ORAL, Take by mouth every evening. , Disp: , Rfl:     valACYclovir (VALTREX) 1000 MG tablet, Take 1 tablet (1,000 mg total) by mouth daily as needed (outbreak)., Disp: 10 tablet, Rfl: 0    Vitals:    11/28/18 1255   BP: 120/61   Pulse: 66   Temp: 98 °F (36.7 °C)   Weight: 66.7 kg (146 lb 15 oz)   Height: 5' 2" (1.575 m)   PainSc:   2   PainLoc: Neck       Physical Exam   Constitutional: She is oriented to person, place, and time. Vital signs are normal. She appears well-developed and well-nourished.   HENT:   Head: Normocephalic and atraumatic.   Right Ear: Hearing normal.   Left Ear: Hearing normal.   Nose: Nose normal.   Mouth/Throat: Uvula is midline.   Eyes: Conjunctivae, EOM and lids " are normal. Pupils are equal, round, and reactive to light.   Neck: Trachea normal, normal range of motion, full passive range of motion without pain and phonation normal. Neck supple.   Cardiovascular: Normal rate, regular rhythm, intact distal pulses and normal pulses.   Pulmonary/Chest: Effort normal and breath sounds normal.   Abdominal: Soft. Normal appearance.   Musculoskeletal: Normal range of motion.   Feet:   Right Foot:   Protective Sensation: 4 sites tested. 4 sites sensed.   Skin Integrity: Negative for ulcer.   Left Foot:   Protective Sensation: 4 sites tested. 4 sites sensed.   Skin Integrity: Negative for ulcer.   Neurological: She is alert and oriented to person, place, and time. She has normal strength and normal reflexes. No cranial nerve deficit or sensory deficit. She has a normal Finger-Nose-Finger Test, a normal Heel to Shin Test, a normal Romberg Test and a normal Tandem Gait Test. She displays a negative Romberg sign. Gait normal.   Reflex Scores:       Tricep reflexes are 2+ on the right side and 2+ on the left side.       Bicep reflexes are 2+ on the right side and 2+ on the left side.       Brachioradialis reflexes are 2+ on the right side and 2+ on the left side.       Patellar reflexes are 2+ on the right side and 2+ on the left side.       Achilles reflexes are 2+ on the right side and 2+ on the left side.  Skin: Skin is warm, dry and intact. Capillary refill takes less than 2 seconds.   Psychiatric: She has a normal mood and affect. Her speech is normal and behavior is normal. Judgment and thought content normal. Cognition and memory are normal.   Nursing note and vitals reviewed.      Neurologic Exam     Mental Status   Oriented to person, place, and time.   Follows 3 step commands.   Attention: normal. Concentration: normal.   Speech: speech is normal   Level of consciousness: alert  Knowledge: good.   Able to name object.     Cranial Nerves     CN II   Visual fields full to  confrontation.   Visual acuity: normal  Right visual field deficit: none  Left visual field deficit: none     CN III, IV, VI   Pupils are equal, round, and reactive to light.  Extraocular motions are normal.   CN III: no CN III palsy  CN VI: no CN VI palsy    CN V   Facial sensation intact.   Right facial sensation deficit: none  Left facial sensation deficit: none    CN VII   Facial expression full, symmetric.   Right facial weakness: none  Left facial weakness: none    CN VIII   CN VIII normal.   Hearing: intact    CN IX, X   CN IX normal.   CN X normal.     CN XI   CN XI normal.     CN XII   CN XII normal.     Motor Exam   Muscle bulk: normal  Overall muscle tone: normal  Right arm tone: normal  Left arm tone: normal  Right arm pronator drift: absent  Left arm pronator drift: absent  Right leg tone: normal  Left leg tone: normal    Strength   Strength 5/5 throughout.   Right neck flexion: 5/5  Left neck flexion: 5/5  Right neck extension: 5/5  Left neck extension: 5/5  Right deltoid: 5/5  Left deltoid: 5/5  Right biceps: 5/5  Left biceps: 5/5  Right triceps: 5/5  Left triceps: 5/5  Right wrist flexion: 5/5  Left wrist flexion: 5/5  Right wrist extension: 5/5  Left wrist extension: 5/5  Right interossei: 5/5  Left interossei: 5/5  Right abdominals: 5/5  Left abdominals: 5/5  Right iliopsoas: 5/5  Left iliopsoas: 5/5  Right quadriceps: 5/5  Left quadriceps: 5/5  Right hamstrin/5  Left hamstrin/5  Right glutei: 5/5  Left glutei: 5/5  Right anterior tibial: 5/5  Left anterior tibial: 5/5  Right posterior tibial: 5/5  Left posterior tibial: 5/5  Right peroneal: 5/5  Left peroneal: 5/5  Right gastroc: 5/5  Left gastroc: 5/5    Sensory Exam   Light touch normal.   Right arm light touch: normal  Left arm light touch: normal  Right leg light touch: normal  Left leg light touch: normal  Vibration normal.     Gait, Coordination, and Reflexes     Gait  Gait: normal    Coordination   Romberg: negative  Finger to nose  coordination: normal  Heel to shin coordination: normal  Tandem walking coordination: normal    Tremor   Resting tremor: absent  Intention tremor: absent  Action tremor: absent    Reflexes   Right brachioradialis: 2+  Left brachioradialis: 2+  Right biceps: 2+  Left biceps: 2+  Right triceps: 2+  Left triceps: 2+  Right patellar: 2+  Left patellar: 2+  Right achilles: 2+  Left achilles: 2+  Right : 2+  Left : 2+  Right plantar: normal  Left plantar: normal  Right Martin: absent  Left Martin: absent  Right ankle clonus: absent  Left ankle clonus: absent        Visit Diagnosis:  S/P cervical spinal fusion        Provider dictation:  Oswestry score: 18%  PHQ:  10      The patient is a right-hand-dominant 73-year-old  female presenting today for postoperative evaluation.  She is 4 weeks status post C5-C7 ACDF.  She reports that she fell on Saturday and was not wearing her cervical collar as she is now 4 weeks and decided she could take it off.  She has been intermittently taking off her collar when at home.  She reports full resolution of her arm pain but continues to have some posterior neck soreness with pain rated as 2/10.  She denies numbness or tingling in her upper extremity she denies upper or lower extremity weakness.  She denies bowel or bladder dysfunction.    On physical examination, the incision is clean dry and intact.  She has good range of motion of the cervical spine.  She continues to have a bilateral Martin's but strength in the upper extremities is maintained.    X-ray of the cervical spine demonstrates C5-C7 anterior cervical instrumentation and fusion without evidence of hardware failure or complication.    At this time the patient has recovered as expected postoperatively.  She may wean out of the cervical collar.  We discussed adjacent level disease and healthy spine precautions.  We will follow-up with her at 12 weeks postoperative.      This note was done using voice  recognition software. Please excuse any errors missed in proof reading.

## 2019-02-11 ENCOUNTER — OFFICE VISIT (OUTPATIENT)
Dept: FAMILY MEDICINE | Facility: CLINIC | Age: 74
End: 2019-02-11
Payer: MEDICARE

## 2019-02-11 VITALS
WEIGHT: 146.81 LBS | HEART RATE: 66 BPM | RESPIRATION RATE: 18 BRPM | SYSTOLIC BLOOD PRESSURE: 124 MMHG | TEMPERATURE: 99 F | BODY MASS INDEX: 27.02 KG/M2 | HEIGHT: 62 IN | OXYGEN SATURATION: 97 % | DIASTOLIC BLOOD PRESSURE: 82 MMHG

## 2019-02-11 DIAGNOSIS — M54.2 NECK PAIN: ICD-10-CM

## 2019-02-11 DIAGNOSIS — K51.00 ULCERATIVE PANCOLITIS WITHOUT COMPLICATION: ICD-10-CM

## 2019-02-11 DIAGNOSIS — R41.3 MEMORY CHANGES: Primary | ICD-10-CM

## 2019-02-11 DIAGNOSIS — F33.40 RECURRENT MAJOR DEPRESSIVE DISORDER, IN REMISSION: ICD-10-CM

## 2019-02-11 PROCEDURE — 99999 PR PBB SHADOW E&M-EST. PATIENT-LVL IV: CPT | Mod: PBBFAC,,, | Performed by: FAMILY MEDICINE

## 2019-02-11 PROCEDURE — 99214 PR OFFICE/OUTPT VISIT, EST, LEVL IV, 30-39 MIN: ICD-10-PCS | Mod: S$GLB,,, | Performed by: FAMILY MEDICINE

## 2019-02-11 PROCEDURE — 1101F PT FALLS ASSESS-DOCD LE1/YR: CPT | Mod: CPTII,S$GLB,, | Performed by: FAMILY MEDICINE

## 2019-02-11 PROCEDURE — 99499 UNLISTED E&M SERVICE: CPT | Mod: S$GLB,,, | Performed by: FAMILY MEDICINE

## 2019-02-11 PROCEDURE — 99999 PR PBB SHADOW E&M-EST. PATIENT-LVL IV: ICD-10-PCS | Mod: PBBFAC,,, | Performed by: FAMILY MEDICINE

## 2019-02-11 PROCEDURE — 99214 OFFICE O/P EST MOD 30 MIN: CPT | Mod: S$GLB,,, | Performed by: FAMILY MEDICINE

## 2019-02-11 PROCEDURE — 1101F PR PT FALLS ASSESS DOC 0-1 FALLS W/OUT INJ PAST YR: ICD-10-PCS | Mod: CPTII,S$GLB,, | Performed by: FAMILY MEDICINE

## 2019-02-11 PROCEDURE — 99499 RISK ADDL DX/OHS AUDIT: ICD-10-PCS | Mod: S$GLB,,, | Performed by: FAMILY MEDICINE

## 2019-02-11 RX ORDER — GABAPENTIN 300 MG/1
300 CAPSULE ORAL NIGHTLY PRN
Qty: 90 CAPSULE | Refills: 0 | Status: SHIPPED | OUTPATIENT
Start: 2019-02-11 | End: 2019-06-11

## 2019-02-11 NOTE — PROGRESS NOTES
"Subjective:       Patient ID: Zara Ann is a 74 y.o. female.    Chief Complaint: Follow-up    HPI  Patient in the office for f/u and review.  Needed to finish her game on her phone before we could start.    She notes some concern re: her memory. Admittedly forgetting things. Having issues recalling short-term conversations. Argument with family about losing a bag of vitamins that she had packed for travel. Similar issue with misplaced envelope.    No issues with self-care, driving, meds.   She's using different otcs for sleep such as melatonin, zzquil, vitamins. Discussed resuming gabapentin for pedal neuropathy which she had stopped prev.    acdf surgery recovery going well. Mohs exicision to L arm healing as well.     Folstein Mini-Mental State Exam    I. ORIENTATION  Record Each Answer: (Maximum Score = 10)  What is today's date? 1  What is today's year?  1  What is the month? 1  What day is today? Day 1  Can you also tell me what season it is? 1  Can you also tell me the name of this clinic? 1  What floor are we on?  1  What city are we in? 1  What county are we in? 1  What state are we in?  1    FINAL SCORE: 10 1 ¨    II. IMMEDIATE RECALL (Maximum Score = 3)  Ball  1  Flag  1  Tree 1    Ask the subject if you may test his/her memory. Say "ball, "flag," "tree" clearly and slowly, about on second for each. Then ask the subject to repeat them. The first repetition determines the score.  If he/she does not repeat all three correctly, keep saying them up to six tries until he/she can repeat them  NUMBER OF TRIALS: 1    FINAL SCORE: 3    III. ATTENTION AND CALCULATION  A. Counting Backwards Test (Maximum Score = 5)  93  1  86  0  79  1  72  1  65  1    Ask the subject to begin with 100 and count backwards by 7. Record each response.  Any response 7 or less than the previous response is a correct response. The score is the number of correct subtractions.   For example, 93, 86, 80, 72, 65 is a score of 4; " "  93, 86, 78 70, 62, is 2;   92, 87, 78, 70, 65 is 0.    B. Spelling Backwards Test  D  1  L  1  R  1  O  1  W  1    Ask the subject to spell the word "WORLD" backwards.  Record each response. Use the instructions to determine which are correct responses.    C. Final Score   Compare the scores of the Counting Backwards and Spelling Backwards  tests. Use the greater of the two scores in deriving the TOTAL SCORE.    FINAL SCORE: 5  (Max of 5 or Greater of the two Scores)    IV. RECALL (Maximum Score = 3)  Ball  1  Flag  1  Tree  1    Ask the subject to recall the three words you previously asked him/her to remember.     FINAL SCORE: 3    V. Language (Maximum Score = 9)    Naming   Watch  1  Pencil 1 ¨ 1    Repetition  Ask the subject to repeat "No, ifs, ands, or buts."  1    Three -Stage Command  Takes paper in hand  1  Folds paper in half  1  Puts paper on floor  1     Establish the subject's dominant hand. Give the subject a sheet of blank paper and say,   "Take the paper in your right/left hand, fold it in half and put it on the floor."     Reading  Closes eyes  1    Hold up the card that reads, "Close your eyes." so the subject  can see it clearly.   Ask him/her to read it and do what it says.     Writing  Writes sentence  1    Give the subject a sheet of blank paper and ask him/her to write a sentence. It is to be written spontaneously. The sentence should be sensible, and contain a subject and a verb. Correct grammar and punctuation are not necessary.    Copying  Copies pentagons  1    Show the subject the drawing of the intersecting pentagons.  Ask him/her to draw the pentagons (about one inch each side)  on the paper provided. Ten angles should be present,and two should intersect.  Ignore tremor and rotation.    FINAL SCORE: 9    TOTAL SCORE 30    24-30 Uncertain Cognitive Impairment  18-23 Mild to Moderate Cognitive Impairment    0-17 Severe Cognitive Impairment       Review of Systems:  Review of Systems " "  Constitutional: Negative for fatigue and unexpected weight change.   HENT: Negative for congestion, postnasal drip and rhinorrhea.         Slight increase in her allergy sx of late   Respiratory: Negative for cough and shortness of breath.    Cardiovascular: Negative for chest pain and leg swelling.   Gastrointestinal: Negative for constipation (occ), diarrhea and nausea.   Genitourinary: Negative for difficulty urinating and dysuria.   Musculoskeletal: Negative for arthralgias and back pain.   Skin: Negative for color change and rash.   Neurological: Negative for dizziness and headaches.   Psychiatric/Behavioral: Positive for confusion (some memory slips) and sleep disturbance. Negative for dysphoric mood (doing ok but not great).       Objective:     Vitals:    02/11/19 1347   BP: 124/82   BP Location: Left arm   Patient Position: Sitting   BP Method: Large (Manual)   Pulse: 66   Resp: 18   Temp: 98.5 °F (36.9 °C)   TempSrc: Oral   SpO2: 97%   Weight: 66.6 kg (146 lb 13.2 oz)   Height: 5' 2" (1.575 m)        Physical Exam   Constitutional: She is oriented to person, place, and time. She appears well-developed and well-nourished. No distress.   HENT:   Head: Normocephalic and atraumatic.   Eyes: Conjunctivae are normal. Right eye exhibits no discharge. Left eye exhibits no discharge. No scleral icterus.   Neck: Normal range of motion. Neck supple.   Cardiovascular: Normal rate and regular rhythm.   Pulmonary/Chest: Effort normal and breath sounds normal. No respiratory distress.   Abdominal: Soft. She exhibits no distension.   Musculoskeletal: Normal range of motion. She exhibits no edema.   Neurological: She is alert and oriented to person, place, and time.   Skin: Skin is warm and dry. No rash noted.   Psychiatric: She has a normal mood and affect. Her behavior is normal.   Nursing note and vitals reviewed.        Assessment & Plan:  Memory changes  -     Ambulatory referral to Neurology  -     MRI Brain " Without Contrast; Future; Expected date: 02/11/2019  -     Ambulatory referral to Psychology  mmse preserved. Cont zoloft, schedule f/u in neuro. Recommend counseling for personal stressors.  Recurrent major depressive disorder, in remission  -     Ambulatory referral to Psychology  Neck pain  -     gabapentin (NEURONTIN) 300 MG capsule; Take 1 capsule (300 mg total) by mouth nightly as needed.  Dispense: 90 capsule; Refill: 0  Resume prn.  Ulcerative pancolitis without complication  No recent ex.

## 2019-02-21 ENCOUNTER — OFFICE VISIT (OUTPATIENT)
Dept: NEUROLOGY | Facility: CLINIC | Age: 74
End: 2019-02-21
Payer: MEDICARE

## 2019-02-21 VITALS
DIASTOLIC BLOOD PRESSURE: 76 MMHG | RESPIRATION RATE: 18 BRPM | BODY MASS INDEX: 27.23 KG/M2 | WEIGHT: 148.88 LBS | SYSTOLIC BLOOD PRESSURE: 196 MMHG | HEART RATE: 61 BPM

## 2019-02-21 DIAGNOSIS — R41.89 SUBJECTIVE MEMORY COMPLAINTS: Primary | ICD-10-CM

## 2019-02-21 DIAGNOSIS — G95.9 CERVICAL MYELOPATHY: ICD-10-CM

## 2019-02-21 DIAGNOSIS — R20.2 PARESTHESIA: ICD-10-CM

## 2019-02-21 DIAGNOSIS — I10 HYPERTENSION, UNSPECIFIED TYPE: ICD-10-CM

## 2019-02-21 PROCEDURE — 99999 PR PBB SHADOW E&M-EST. PATIENT-LVL III: ICD-10-PCS | Mod: PBBFAC,,, | Performed by: PSYCHIATRY & NEUROLOGY

## 2019-02-21 PROCEDURE — 99215 PR OFFICE/OUTPT VISIT, EST, LEVL V, 40-54 MIN: ICD-10-PCS | Mod: S$GLB,,, | Performed by: PSYCHIATRY & NEUROLOGY

## 2019-02-21 PROCEDURE — 3077F SYST BP >= 140 MM HG: CPT | Mod: CPTII,S$GLB,, | Performed by: PSYCHIATRY & NEUROLOGY

## 2019-02-21 PROCEDURE — 1101F PT FALLS ASSESS-DOCD LE1/YR: CPT | Mod: CPTII,S$GLB,, | Performed by: PSYCHIATRY & NEUROLOGY

## 2019-02-21 PROCEDURE — 1101F PR PT FALLS ASSESS DOC 0-1 FALLS W/OUT INJ PAST YR: ICD-10-PCS | Mod: CPTII,S$GLB,, | Performed by: PSYCHIATRY & NEUROLOGY

## 2019-02-21 PROCEDURE — 3078F DIAST BP <80 MM HG: CPT | Mod: CPTII,S$GLB,, | Performed by: PSYCHIATRY & NEUROLOGY

## 2019-02-21 PROCEDURE — 99215 OFFICE O/P EST HI 40 MIN: CPT | Mod: S$GLB,,, | Performed by: PSYCHIATRY & NEUROLOGY

## 2019-02-21 PROCEDURE — 99499 UNLISTED E&M SERVICE: CPT | Mod: S$GLB,,, | Performed by: PSYCHIATRY & NEUROLOGY

## 2019-02-21 PROCEDURE — 99499 RISK ADDL DX/OHS AUDIT: ICD-10-PCS | Mod: S$GLB,,, | Performed by: PSYCHIATRY & NEUROLOGY

## 2019-02-21 PROCEDURE — 99999 PR PBB SHADOW E&M-EST. PATIENT-LVL III: CPT | Mod: PBBFAC,,, | Performed by: PSYCHIATRY & NEUROLOGY

## 2019-02-21 PROCEDURE — 3077F PR MOST RECENT SYSTOLIC BLOOD PRESSURE >= 140 MM HG: ICD-10-PCS | Mod: CPTII,S$GLB,, | Performed by: PSYCHIATRY & NEUROLOGY

## 2019-02-21 PROCEDURE — 3078F PR MOST RECENT DIASTOLIC BLOOD PRESSURE < 80 MM HG: ICD-10-PCS | Mod: CPTII,S$GLB,, | Performed by: PSYCHIATRY & NEUROLOGY

## 2019-02-21 NOTE — LETTER
February 21, 2019      Ashlie Adam MD  [address]  [place] [address]           [facility]  [address] [facility] [address] [place] [address]  Phone: [phone]  Fax: [phone]          Patient: Zara Ann   MR Number: [id number]   YOB: 1945   Date of Visit: 2/21/2019       Dear Dr. Ashlie Adam:    Thank you for referring Zara Ann to me for evaluation. Attached you will find relevant portions of my assessment and plan of care.    If you have questions, please do not hesitate to call me. I look forward to following Zara Ann along with you.    Sincerely,    Max Corado Jr., MD    Enclosure  CC:  No Recipients    If you would like to receive this communication electronically, please contact [email or web address] or [phone] to request more information on [facility] [employer] access.    For providers and/or their staff who would like to refer a patient to [facility], please contact us through our one-stop-shop provider referral line, [facility], at [phone].    If you feel you have received this communication in error or would no longer like to receive these types of communications, please e-mail [email or web address]

## 2019-02-21 NOTE — PROGRESS NOTES
"Date of service:  2/21/2019    Chief complaint:  Memory Loss    History of present illness:  The patient is a 74 y.o. female referred for evaluation of memory loss.  She previously saw Dr. Page for symptoms ultimately found to be related to cervical myelopathy.  This is my first time seeing her.  The memory issue began a few months ago. It involves short-term memory more than long-term memory.  She recounts a specific issue involving packing of an item.  She reports no difficulties with executive function.  There are no issues with multiple step processes. She has no problems with ADLs. She does not get lost in familiar areas. There are no hallucinations. There are no personality changes.  She does endorse depression.    The patient also complains of a feeling of "tissue paper" under her feet.  This started 2-3 years ago.  She feels like it has worsened and has moved up her legs.      Past Medical History:   Diagnosis Date    Cancer     left arm skin    Depression     Diverticulosis     Fatty liver     Genital herpes     GERD (gastroesophageal reflux disease)     Gout     History of use of hearing aid in left ear     Osteopenia     RAD (reactive airway disease)     Rheumatic fever     UC (ulcerative colitis)        Past Surgical History:   Procedure Laterality Date    APPENDECTOMY      CHOLECYSTECTOMY      COLONOSCOPY  2013    every 2 yrs    DECOMPRESSION AND FUSION, SPINE, CERVICAL, ANTERIOR APPROACH  C5-C7 ANTERIOR CERVICAL DECOMPRESSION AND FUSION N/A 10/22/2018    Performed by Dem[facility]ri Caro [facility] at [facility] OR    HYSTERECTOMY      parital - benign - fibroids    Rectocele  1983    with hysterectomy       Family History   Problem Relation Age of Onset    Cancer Mother         colon    Cancer Father         kidney     Ulcers Sister     Pancreatitis Sister     Breast cancer Sister     Liver cancer Paternal Aunt        Social History     Socioeconomic History    Marital status:      " Spouse name: Not on file    Number of children: Not on file    Years of education: Not on file    Highest education level: Not on file   Social Needs    Financial resource strain: Not on file    Food insecurity - worry: Not on file    Food insecurity - inability: Not on file    Transportation needs - medical: Not on file    Transportation needs - non-medical: Not on file   Occupational History    Not on file   Tobacco Use    Smoking status: Never Smoker    Smokeless tobacco: Never Used   Substance and Sexual Activity    Alcohol use: Yes     Comment: rarely     Drug use: No    Sexual activity: Not on file   Other Topics Concern    Not on file   Social History Narrative    Not on file        Review of patient's allergies indicates:   Allergen Reactions    Ciprofloxacin Shortness Of Breath    Clindamycin Shortness Of Breath    Nitrofurantoin monohyd/m-cryst Shortness Of Breath, Anaphylaxis and Itching    Penicillins Hives    Sulfa (sulfonamide antibiotics) Hives    Cephalosporins Hives    Hydrocod-cpm-pe-acetaminophen     Hydrocodone Itching    Hydrocortisone Nausea Only    Sulfamethoxazole-trimethoprim Itching       Review of Systems  The patient's review of systems is not significantly changed from her prior visit with Dr. Page except as noted above.    Physical exam:  BP (!) 196/76 (BP Location: Right arm, Patient Position: Sitting, BP Method: Medium (Automatic))   Pulse 61   Resp 18   Wt 67.5 kg (148 lb 14.4 oz)   BMI 27.23 kg/m²   General: Well developed, well nourished.  No acute distress.  ENT: Mucus membranes moist.  Atraumatic external nose and ears.  Lymphatic: No apparent lymphadenopathy.  Cardiovascular: Regular rate and rhythm.  Pulmonary: No increased work of breathing.  Abdomen/GI: No guarding.  Musculoskeletal: No obvious joint deformities, moves all extremities well.    Neurological exam:  Mental status: Awake and alert.  Oriented x4.  Speech fluent and appropriate.   "Recent and remote memory appear to be normal.  Fund of knowledge normal.  MMSE = 29/30 (thought the date was the 20th instead of the 21st).  Good clock drawing.  Cranial nerves: Pupils equal round and reactive to light, extraocular movements intact, facial strength and sensation intact bilaterally, palate and tongue midline, hearing grossly intact bilaterally.  Motor: 5 out of 5 strength throughout the upper and lower extremities bilaterally. Normal bulk and tone.  Sensation: Intact to light touch and temperature bilaterally.  DTR: 3+ at the knees and 2+ biceps bilaterally.  Coordination: Finger-nose-finger testing intact bilaterally.  Gait: Normal gait. Good tandem.    Data base:  Notes of the referring physician were reviewed.  Briefly summarized, these discuss her memory concerns.  Notes from Dr. Page were also reviewed.  These relate to the visit for the patient's complaint of hand numbness.    Labs (10/18):  BMP: includes K=3.4 and gza=842  CBC: unremarkable    MRI brain:  Pending    MRI C-spine (9/18):  "Multilevel degenerative change most notable for protrusion and central canal stenosis at the C6-C7 level with probable anterior cord contact and no significant fluid surrounding the cord.  The central canal is narrowed to 7 mm.  Some increased T2 signal may be present within the cord at this level suggestive of cord edema or myelomalacia unchanged since the prior."  I independently visualized the images of this study and interpreted them.     Neuropsychological testing:  NA    EMG LEs (10/18):  No evidence of neuropathy.  Question of left lumbar radiculopathies at L1 and L5.    Assessment and plan:  The patient is a 74 y.o. female referred for evaluation of memory loss. I feel that the differential diagnosis includes pseudodementia related to depression.  I think any neurodegenerative cause for her memory complaints is less likely. I think it would be reasonable to follow a course of observation, repeating " her MMSE in 6 months.  Reassurance was provided.     The patient also had questions related to paresthesias involving the feet.  Her exam is not terribly suggestive of a peripheral neuropathy, and no evidence of neuropathy was seen on her EMG performed about 4 months ago.  It may be that her paresthesias are a function of her history of spinal cord compression.  I have suggested observation.  Should her symptoms worsen, we could consider repeating the EMG.    The patient was noted to be hypertensive in clinic today.  Repeat BP check confirmed this.  I have asked the patient to follow up with their PCP about this and my staff to message the patient's PCP.     We will plan on seeing the patient back once we have some additional data to work with.

## 2019-03-18 ENCOUNTER — TELEPHONE (OUTPATIENT)
Dept: FAMILY MEDICINE | Facility: CLINIC | Age: 74
End: 2019-03-18

## 2019-03-18 NOTE — TELEPHONE ENCOUNTER
Spoke with patient and offered appt today or to go to  to be evaluated. Patient refused. Offered to schedule tomorrow but patient refused. States that she will just rest and see if she gets better. Advised to call if any concerns.

## 2019-03-18 NOTE — TELEPHONE ENCOUNTER
----- Message from Claudette Agarwal sent at 3/18/2019 11:17 AM CDT -----  Type:  Same Day Appointment Request    Caller is requesting a same day appointment.  Caller declined first available appointment listed below.      Name of Caller:Patient  When is the first available appointment?  3/19/19  Symptoms:  Extreme diarrhea  Best Call Back Number:  [phone] (home)     Additional Information:   na

## 2019-03-19 ENCOUNTER — OFFICE VISIT (OUTPATIENT)
Dept: URGENT CARE | Facility: CLINIC | Age: 74
End: 2019-03-19
Payer: MEDICARE

## 2019-03-19 ENCOUNTER — TELEPHONE (OUTPATIENT)
Dept: URGENT CARE | Facility: CLINIC | Age: 74
End: 2019-03-19

## 2019-03-19 VITALS
BODY MASS INDEX: 27.23 KG/M2 | HEART RATE: 90 BPM | OXYGEN SATURATION: 99 % | HEIGHT: 62 IN | TEMPERATURE: 97 F | SYSTOLIC BLOOD PRESSURE: 124 MMHG | RESPIRATION RATE: 18 BRPM | WEIGHT: 148 LBS | DIASTOLIC BLOOD PRESSURE: 71 MMHG

## 2019-03-19 DIAGNOSIS — M79.89 PAIN AND SWELLING OF TOE OF RIGHT FOOT: ICD-10-CM

## 2019-03-19 DIAGNOSIS — Z78.9 MEDICATION INTOLERANCE: ICD-10-CM

## 2019-03-19 DIAGNOSIS — M79.674 PAIN AND SWELLING OF TOE OF RIGHT FOOT: ICD-10-CM

## 2019-03-19 DIAGNOSIS — R30.9 PAIN PASSING URINE: ICD-10-CM

## 2019-03-19 DIAGNOSIS — S92.511A CLOSED DISPLACED FRACTURE OF PROXIMAL PHALANX OF LESSER TOE OF RIGHT FOOT, INITIAL ENCOUNTER: Primary | ICD-10-CM

## 2019-03-19 LAB
BILIRUB UR QL STRIP: NEGATIVE
GLUCOSE UR QL STRIP: NEGATIVE
KETONES UR QL STRIP: NEGATIVE
LEUKOCYTE ESTERASE UR QL STRIP: POSITIVE
PH, POC UA: 5 (ref 5–8)
POC BLOOD, URINE: NEGATIVE
POC NITRATES, URINE: NEGATIVE
PROT UR QL STRIP: POSITIVE
SP GR UR STRIP: 1.03 (ref 1–1.03)
UROBILINOGEN UR STRIP-ACNC: 1 (ref 0.1–1.1)

## 2019-03-19 PROCEDURE — 81003 URINALYSIS AUTO W/O SCOPE: CPT | Mod: QW,S$GLB,, | Performed by: FAMILY MEDICINE

## 2019-03-19 PROCEDURE — 73660 X-RAY EXAM OF TOE(S): CPT | Mod: RT,S$GLB,, | Performed by: RADIOLOGY

## 2019-03-19 PROCEDURE — 99214 PR OFFICE/OUTPT VISIT, EST, LEVL IV, 30-39 MIN: ICD-10-PCS | Mod: 25,S$GLB,, | Performed by: FAMILY MEDICINE

## 2019-03-19 PROCEDURE — 3074F PR MOST RECENT SYSTOLIC BLOOD PRESSURE < 130 MM HG: ICD-10-PCS | Mod: CPTII,S$GLB,, | Performed by: FAMILY MEDICINE

## 2019-03-19 PROCEDURE — 1101F PR PT FALLS ASSESS DOC 0-1 FALLS W/OUT INJ PAST YR: ICD-10-PCS | Mod: CPTII,S$GLB,, | Performed by: FAMILY MEDICINE

## 2019-03-19 PROCEDURE — 73660 XR TOE 2 OR MORE VIEWS RIGHT: ICD-10-PCS | Mod: RT,S$GLB,, | Performed by: RADIOLOGY

## 2019-03-19 PROCEDURE — 3078F DIAST BP <80 MM HG: CPT | Mod: CPTII,S$GLB,, | Performed by: FAMILY MEDICINE

## 2019-03-19 PROCEDURE — 99214 OFFICE O/P EST MOD 30 MIN: CPT | Mod: 25,S$GLB,, | Performed by: FAMILY MEDICINE

## 2019-03-19 PROCEDURE — 1101F PT FALLS ASSESS-DOCD LE1/YR: CPT | Mod: CPTII,S$GLB,, | Performed by: FAMILY MEDICINE

## 2019-03-19 PROCEDURE — 3078F PR MOST RECENT DIASTOLIC BLOOD PRESSURE < 80 MM HG: ICD-10-PCS | Mod: CPTII,S$GLB,, | Performed by: FAMILY MEDICINE

## 2019-03-19 PROCEDURE — 81003 POCT URINALYSIS, DIPSTICK, AUTOMATED, W/O SCOPE: ICD-10-PCS | Mod: QW,S$GLB,, | Performed by: FAMILY MEDICINE

## 2019-03-19 PROCEDURE — 3074F SYST BP LT 130 MM HG: CPT | Mod: CPTII,S$GLB,, | Performed by: FAMILY MEDICINE

## 2019-03-19 NOTE — TELEPHONE ENCOUNTER
Pt left from clinic prior to giving enough of urine specimen. Call to return was unanswered. Will attempt again

## 2019-03-19 NOTE — PROGRESS NOTES
"Subjective:       Patient ID: Zara Ann is a 74 y.o. female.    Vitals:  height is 5' 2" (1.575 m) and weight is 67.1 kg (148 lb). Her oral temperature is 97 °F (36.1 °C). Her blood pressure is 124/71 and her pulse is 90. Her respiration is 18 and oxygen saturation is 99%.     Chief Complaint: Foot Injury    New problem right foot pain after cabbage hit it. Pt states had diarrhea since yesterday and not able to eat much. Bilateral hand pains "feels like pin and needle" and cannot sleep at night. Pt states also wants to check her urine for bladder infection      Foot Injury    The incident occurred 12 to 24 hours ago. The incident occurred at home. The injury mechanism was a direct blow. The pain is present in the right foot. The quality of the pain is described as aching. The pain has been constant since onset. Associated symptoms include an inability to bear weight, numbness and tingling. She reports no foreign bodies present. The symptoms are aggravated by weight bearing. She has tried acetaminophen for the symptoms. The treatment provided no relief.       Constitution: Negative for chills, fatigue and fever.   HENT: Negative for congestion and sore throat.    Neck: Negative for painful lymph nodes.   Cardiovascular: Negative for chest pain and leg swelling.   Eyes: Negative for double vision and blurred vision.   Respiratory: Negative for cough and shortness of breath.    Gastrointestinal: Negative for nausea, vomiting and diarrhea.   Genitourinary: Negative for dysuria, frequency, urgency and history of kidney stones.   Musculoskeletal: Negative for joint pain, joint swelling, muscle cramps and muscle ache.   Skin: Negative for color change, pale, rash and bruising.   Allergic/Immunologic: Negative for seasonal allergies.   Neurological: Positive for numbness. Negative for dizziness, history of vertigo, light-headedness, passing out and headaches.   Hematologic/Lymphatic: Negative for swollen lymph " nodes.   Psychiatric/Behavioral: Negative for nervous/anxious, sleep disturbance and depression. The patient is not nervous/anxious.        Objective:      Physical Exam   Constitutional: She is oriented to person, place, and time. She appears well-developed and well-nourished. She is cooperative.  Non-toxic appearance. She does not appear ill. No distress.   HENT:   Head: Normocephalic and atraumatic.   Right Ear: Hearing, tympanic membrane, external ear and ear canal normal.   Left Ear: Hearing, tympanic membrane, external ear and ear canal normal.   Nose: Nose normal. No mucosal edema, rhinorrhea or nasal deformity. No epistaxis. Right sinus exhibits no maxillary sinus tenderness and no frontal sinus tenderness. Left sinus exhibits no maxillary sinus tenderness and no frontal sinus tenderness.   Mouth/Throat: Uvula is midline, oropharynx is clear and moist and mucous membranes are normal. No trismus in the jaw. Normal dentition. No uvula swelling. No posterior oropharyngeal erythema.   Eyes: Conjunctivae and lids are normal. Right eye exhibits no discharge. Left eye exhibits no discharge. No scleral icterus.   Sclera clear bilat   Neck: Trachea normal, normal range of motion, full passive range of motion without pain and phonation normal. Neck supple.   Cardiovascular: Normal rate, regular rhythm, normal heart sounds, intact distal pulses and normal pulses.   Pulmonary/Chest: Effort normal and breath sounds normal. No respiratory distress.   Abdominal: Soft. Normal appearance and bowel sounds are normal. She exhibits no distension, no pulsatile midline mass and no mass. There is tenderness.       Musculoskeletal: She exhibits tenderness. She exhibits no edema or deformity.        Right foot: There is decreased range of motion, tenderness, bony tenderness and swelling.        Feet:    Neurological: She is alert and oriented to person, place, and time. She exhibits normal muscle tone. Coordination normal.   Skin:  Skin is warm, dry and intact. She is not diaphoretic. No pallor.   Psychiatric: She has a normal mood and affect. Her speech is normal and behavior is normal. Judgment and thought content normal. Cognition and memory are normal.   Nursing note and vitals reviewed.      Assessment:       1. Closed displaced fracture of proximal phalanx of lesser toe of right foot, initial encounter    2. Pain passing urine    3. Pain and swelling of toe of right foot    4. Medication intolerance        Plan:         Closed displaced fracture of proximal phalanx of lesser toe of right foot, initial encounter  -     Ambulatory referral to Orthopedics    Pain passing urine  -     POCT Urinalysis, Dipstick, Automated, W/O Scope  -     Urine culture    Pain and swelling of toe of right foot  -     X-Ray Toe 2 or More Views Right; Future; Expected date: 03/19/2019    Medication intolerance  -     Ambulatory referral to Allergy        pt with mult allergies to medications. Advised f/u with A&I as pt is limited on medication usage. Will send urine cx to determine if uti before we start treatment. Pt OK with plan  Pt states that she has boot at home      Xray: Right 5th toe fracture minimally displaced    X-ray Toe 2 Or More Views Right    Result Date: 3/19/2019  EXAMINATION: XR TOE 2 OR MORE VIEWS RIGHT CLINICAL HISTORY: Pain in right toe(s) TECHNIQUE: Three views of the right toes COMPARISON: None FINDINGS: There is a mildly displaced, mildly comminuted fracture of the distal metaphysis the right 5th toe proximal phalanx.     Right 5th proximal phalanx fracture as above. Electronically signed by: Gregory West MD Date:    03/19/2019 Time:    11:28

## 2019-03-22 ENCOUNTER — TELEPHONE (OUTPATIENT)
Dept: URGENT CARE | Facility: CLINIC | Age: 74
End: 2019-03-22

## 2019-03-22 LAB
BACTERIA UR CULT: NO GROWTH
BACTERIA UR CULT: NORMAL

## 2019-03-23 ENCOUNTER — PATIENT MESSAGE (OUTPATIENT)
Dept: PAIN MEDICINE | Facility: CLINIC | Age: 74
End: 2019-03-23

## 2019-03-25 ENCOUNTER — NURSE TRIAGE (OUTPATIENT)
Dept: ADMINISTRATIVE | Facility: CLINIC | Age: 74
End: 2019-03-25

## 2019-03-25 ENCOUNTER — OFFICE VISIT (OUTPATIENT)
Dept: URGENT CARE | Facility: CLINIC | Age: 74
End: 2019-03-25
Payer: MEDICARE

## 2019-03-25 VITALS
SYSTOLIC BLOOD PRESSURE: 140 MMHG | HEART RATE: 80 BPM | DIASTOLIC BLOOD PRESSURE: 74 MMHG | BODY MASS INDEX: 27.23 KG/M2 | TEMPERATURE: 98 F | OXYGEN SATURATION: 100 % | WEIGHT: 148 LBS | RESPIRATION RATE: 16 BRPM | HEIGHT: 62 IN

## 2019-03-25 DIAGNOSIS — K59.03 DRUG-INDUCED CONSTIPATION: Primary | ICD-10-CM

## 2019-03-25 DIAGNOSIS — M54.12 CERVICAL RADICULOPATHY: ICD-10-CM

## 2019-03-25 PROCEDURE — 3078F DIAST BP <80 MM HG: CPT | Mod: CPTII,S$GLB,, | Performed by: PHYSICIAN ASSISTANT

## 2019-03-25 PROCEDURE — 3077F PR MOST RECENT SYSTOLIC BLOOD PRESSURE >= 140 MM HG: ICD-10-PCS | Mod: CPTII,S$GLB,, | Performed by: PHYSICIAN ASSISTANT

## 2019-03-25 PROCEDURE — 1101F PR PT FALLS ASSESS DOC 0-1 FALLS W/OUT INJ PAST YR: ICD-10-PCS | Mod: CPTII,S$GLB,, | Performed by: PHYSICIAN ASSISTANT

## 2019-03-25 PROCEDURE — 3077F SYST BP >= 140 MM HG: CPT | Mod: CPTII,S$GLB,, | Performed by: PHYSICIAN ASSISTANT

## 2019-03-25 PROCEDURE — 1101F PT FALLS ASSESS-DOCD LE1/YR: CPT | Mod: CPTII,S$GLB,, | Performed by: PHYSICIAN ASSISTANT

## 2019-03-25 PROCEDURE — 99214 PR OFFICE/OUTPT VISIT, EST, LEVL IV, 30-39 MIN: ICD-10-PCS | Mod: S$GLB,,, | Performed by: PHYSICIAN ASSISTANT

## 2019-03-25 PROCEDURE — 99214 OFFICE O/P EST MOD 30 MIN: CPT | Mod: S$GLB,,, | Performed by: PHYSICIAN ASSISTANT

## 2019-03-25 PROCEDURE — 3078F PR MOST RECENT DIASTOLIC BLOOD PRESSURE < 80 MM HG: ICD-10-PCS | Mod: CPTII,S$GLB,, | Performed by: PHYSICIAN ASSISTANT

## 2019-03-25 RX ORDER — LUBIPROSTONE 24 UG/1
24 CAPSULE ORAL 2 TIMES DAILY WITH MEALS
Qty: 60 CAPSULE | Refills: 1 | Status: SHIPPED | OUTPATIENT
Start: 2019-03-25 | End: 2019-04-24

## 2019-03-25 NOTE — PROGRESS NOTES
"Subjective:       Patient ID: Zara Ann is a 74 y.o. female.    Vitals:  height is 5' 2" (1.575 m) and weight is 67.1 kg (148 lb). Her temperature is 97.7 °F (36.5 °C). Her blood pressure is 140/74 (abnormal) and her pulse is 80. Her respiration is 16 and oxygen saturation is 100%.     Chief Complaint: Hand Pain    HPI  <OUCOOHADULT>    Objective:      Physical Exam    Assessment:       No diagnosis found.    Plan:         There are no diagnoses linked to this encounter.     "

## 2019-03-25 NOTE — TELEPHONE ENCOUNTER
Spoke with patient and she states that she went to UC for wrist and hand pain and they prescribed some medication. See note from UC today. Patient states that she is following up with pain management and Dr Caro.

## 2019-03-25 NOTE — PATIENT INSTRUCTIONS

## 2019-03-25 NOTE — TELEPHONE ENCOUNTER
Reason for Disposition   SEVERE pain (e.g., excruciating, unable to use hand at all)    Protocols used: HAND AND WRIST PAIN-A-[place]    Ms. Ann states she has severe hand pain. She states her hands are burning and feels like pins and needles are in them. Patient advised to go to the urgent care or ED.

## 2019-03-25 NOTE — PROGRESS NOTES
"Subjective:       Patient ID: Zara Ann is a 74 y.o. female.    Vitals:  height is 5' 2" (1.575 m) and weight is 67.1 kg (148 lb). Her temperature is 97.7 °F (36.5 °C). Her blood pressure is 140/74 (abnormal) and her pulse is 80. Her respiration is 16 and oxygen saturation is 100%.     Chief Complaint: Hand Pain    Pt c/o bilateral hand pain. No trauma. Pt states she has neck pain and hand went numb last year.     Hand Pain    Pertinent negatives include no chest pain.       Constitution: Negative for chills, fatigue and fever.   HENT: Negative for congestion and sore throat.    Neck: Negative for painful lymph nodes.   Cardiovascular: Negative for chest pain and leg swelling.   Eyes: Negative for double vision and blurred vision.   Respiratory: Negative for cough and shortness of breath.    Gastrointestinal: Negative for nausea, vomiting and diarrhea.   Genitourinary: Negative for dysuria, frequency, urgency and history of kidney stones.   Musculoskeletal: Positive for pain. Negative for joint pain, joint swelling, muscle cramps and muscle ache.   Skin: Negative for color change, pale, rash and bruising.   Allergic/Immunologic: Negative for seasonal allergies.   Neurological: Negative for dizziness, history of vertigo, light-headedness, passing out and headaches.   Hematologic/Lymphatic: Negative for swollen lymph nodes.   Psychiatric/Behavioral: Negative for nervous/anxious, sleep disturbance and depression. The patient is not nervous/anxious.        Objective:      Physical Exam   Constitutional: She is oriented to person, place, and time. Vital signs are normal. She appears well-developed and well-nourished. She is active and cooperative. No distress.   HENT:   Head: Normocephalic and atraumatic.   Nose: Nose normal.   Mouth/Throat: Oropharynx is clear and moist and mucous membranes are normal.   Eyes: Conjunctivae and lids are normal.   Neck: Trachea normal, normal range of motion, full passive range " of motion without pain and phonation normal. Neck supple.   Cardiovascular: Normal rate, regular rhythm, normal heart sounds, intact distal pulses and normal pulses.   Pulmonary/Chest: Effort normal and breath sounds normal.   Abdominal: Soft. Normal appearance and bowel sounds are normal. She exhibits no abdominal bruit, no pulsatile midline mass and no mass.   Musculoskeletal: She exhibits no edema or deformity.   5/5 strength in bilateral upper extremities.  Positive Martin's test bilaterally. Normal sensation upper extremities bilaterally.   Neurological: She is alert and oriented to person, place, and time. She has normal strength and normal reflexes. No sensory deficit.   Skin: Skin is warm, dry and intact. She is not diaphoretic.   Psychiatric: She has a normal mood and affect. Her speech is normal and behavior is normal. Judgment and thought content normal. Cognition and memory are normal.   Nursing note and vitals reviewed.      Assessment:       1. Drug-induced constipation    2. Cervical radiculopathy        Plan:         Drug-induced constipation    Cervical radiculopathy    Other orders  -     lubiprostone (AMITIZA) 24 MCG Cap; Take 1 capsule (24 mcg total) by mouth 2 (two) times daily with meals.  Dispense: 60 capsule; Refill: 1     Patient has allergies to multiple pain medications.  She was recently placed on gabapentin but has been hesitant to take this medication due to side effect of constipation.  We discussed that this is an appropriate medication for her symptoms, but that she will also need follow-up with her neurosurgeon and pain management specialist.  Discussed use of Amitiza to alleviate symptom of constipation with gabapentin.  Discussed use of gabapentin at night initially until she becomes more tolerant and does not cause sedation.  I do not appreciate any abnormalities in strength or sensation.  2+ radial pulses bilaterally.  She does have positive Luz's bilaterally. Again she  should follow up with specialist.    You must understand that you've received an Urgent Care treatment only and that you may be released before all your medical problems are known or treated. You, the patient, will arrange for follow up care as instructed.  Follow up with your PCP or specialty clinic as directed in the next 1-2 weeks if not improved or as needed.  You can call [phone] to schedule an appointment with the appropriate provider.  If your condition worsens we recommend that you receive another evaluation at the emergency room immediately or contact your primary medical clinics after hours call service to discuss your concerns.  Please return here or go to the Emergency Department for any concerns or worsening of condition.

## 2019-03-27 ENCOUNTER — TELEPHONE (OUTPATIENT)
Dept: URGENT CARE | Facility: CLINIC | Age: 74
End: 2019-03-27

## 2019-03-27 ENCOUNTER — TELEPHONE (OUTPATIENT)
Dept: FAMILY MEDICINE | Facility: CLINIC | Age: 74
End: 2019-03-27

## 2019-03-27 NOTE — TELEPHONE ENCOUNTER
Spoke c pt, stated hand is not better. Pain is severe. However pt does have follow up appointment this week and will call clinic with any other concerns.

## 2019-03-27 NOTE — TELEPHONE ENCOUNTER
"----- Message from Genna Erickson sent at 3/27/2019 10:24 AM CDT -----  Contact: Patient   Patient called requesting to speak with Dr. Adam in reference to " extreme pain." Patient stated she was prescribed Gabapentin and advised to take once at night, but reports she is in pain during the day and has no idea of what to do for pain. If possible, can you please advise patient?         Thanks,  Genna"

## 2019-03-28 ENCOUNTER — OFFICE VISIT (OUTPATIENT)
Dept: FAMILY MEDICINE | Facility: CLINIC | Age: 74
End: 2019-03-28
Payer: MEDICARE

## 2019-03-28 ENCOUNTER — LAB VISIT (OUTPATIENT)
Dept: LAB | Facility: HOSPITAL | Age: 74
End: 2019-03-28
Attending: INTERNAL MEDICINE
Payer: MEDICARE

## 2019-03-28 VITALS
DIASTOLIC BLOOD PRESSURE: 58 MMHG | SYSTOLIC BLOOD PRESSURE: 100 MMHG | BODY MASS INDEX: 28.03 KG/M2 | HEIGHT: 62 IN | WEIGHT: 152.31 LBS | OXYGEN SATURATION: 96 % | HEART RATE: 84 BPM

## 2019-03-28 DIAGNOSIS — E87.6 HYPOKALEMIA: ICD-10-CM

## 2019-03-28 DIAGNOSIS — R20.2 PARESTHESIA OF BOTH HANDS: Primary | ICD-10-CM

## 2019-03-28 LAB
ANION GAP SERPL CALC-SCNC: 10 MMOL/L (ref 8–16)
BUN SERPL-MCNC: 15 MG/DL (ref 8–23)
CALCIUM SERPL-MCNC: 9.3 MG/DL (ref 8.7–10.5)
CHLORIDE SERPL-SCNC: 106 MMOL/L (ref 95–110)
CO2 SERPL-SCNC: 25 MMOL/L (ref 23–29)
CREAT SERPL-MCNC: 0.8 MG/DL (ref 0.5–1.4)
EST. GFR  (AFRICAN AMERICAN): >60 ML/MIN/1.73 M^2
EST. GFR  (NON AFRICAN AMERICAN): >60 ML/MIN/1.73 M^2
GLUCOSE SERPL-MCNC: 102 MG/DL (ref 70–110)
POTASSIUM SERPL-SCNC: 4.4 MMOL/L (ref 3.5–5.1)
SODIUM SERPL-SCNC: 141 MMOL/L (ref 136–145)

## 2019-03-28 PROCEDURE — 3288F PR FALLS RISK ASSESSMENT DOCUMENTED: ICD-10-PCS | Mod: CPTII,S$GLB,, | Performed by: INTERNAL MEDICINE

## 2019-03-28 PROCEDURE — 99999 PR PBB SHADOW E&M-EST. PATIENT-LVL IV: CPT | Mod: PBBFAC,,, | Performed by: INTERNAL MEDICINE

## 2019-03-28 PROCEDURE — 1100F PTFALLS ASSESS-DOCD GE2>/YR: CPT | Mod: CPTII,S$GLB,, | Performed by: INTERNAL MEDICINE

## 2019-03-28 PROCEDURE — 3074F PR MOST RECENT SYSTOLIC BLOOD PRESSURE < 130 MM HG: ICD-10-PCS | Mod: CPTII,S$GLB,, | Performed by: INTERNAL MEDICINE

## 2019-03-28 PROCEDURE — 3078F PR MOST RECENT DIASTOLIC BLOOD PRESSURE < 80 MM HG: ICD-10-PCS | Mod: CPTII,S$GLB,, | Performed by: INTERNAL MEDICINE

## 2019-03-28 PROCEDURE — 80048 BASIC METABOLIC PNL TOTAL CA: CPT

## 2019-03-28 PROCEDURE — 99213 PR OFFICE/OUTPT VISIT, EST, LEVL III, 20-29 MIN: ICD-10-PCS | Mod: S$GLB,,, | Performed by: INTERNAL MEDICINE

## 2019-03-28 PROCEDURE — 1100F PR PT FALLS ASSESS DOC 2+ FALLS/FALL W/INJURY/YR: ICD-10-PCS | Mod: CPTII,S$GLB,, | Performed by: INTERNAL MEDICINE

## 2019-03-28 PROCEDURE — 3074F SYST BP LT 130 MM HG: CPT | Mod: CPTII,S$GLB,, | Performed by: INTERNAL MEDICINE

## 2019-03-28 PROCEDURE — 3288F FALL RISK ASSESSMENT DOCD: CPT | Mod: CPTII,S$GLB,, | Performed by: INTERNAL MEDICINE

## 2019-03-28 PROCEDURE — 36415 COLL VENOUS BLD VENIPUNCTURE: CPT | Mod: PO

## 2019-03-28 PROCEDURE — 3078F DIAST BP <80 MM HG: CPT | Mod: CPTII,S$GLB,, | Performed by: INTERNAL MEDICINE

## 2019-03-28 PROCEDURE — 99999 PR PBB SHADOW E&M-EST. PATIENT-LVL IV: ICD-10-PCS | Mod: PBBFAC,,, | Performed by: INTERNAL MEDICINE

## 2019-03-28 PROCEDURE — 99213 OFFICE O/P EST LOW 20 MIN: CPT | Mod: S$GLB,,, | Performed by: INTERNAL MEDICINE

## 2019-03-28 RX ORDER — AMITRIPTYLINE HYDROCHLORIDE 25 MG/1
25 TABLET, FILM COATED ORAL NIGHTLY PRN
Qty: 30 TABLET | Refills: 1 | Status: SHIPPED | OUTPATIENT
Start: 2019-03-28 | End: 2019-06-11 | Stop reason: SDUPTHER

## 2019-03-28 NOTE — PROGRESS NOTES
Subjective:       Patient ID: Zara Ann is a 74 y.o. female.    Chief Complaint: Numbness (both hands started monday )    HPI   PMH: UC, anxiety and depression    Had recent neck surgery in October. Had recent viral GI episode.   Has been having hand pain for 5 days. Bilateral, constant, better in afternoon. Pins and needles at tips of fingers. Worse in mornings and night. Has tried ibuprofen, aspirin, and gabapentin. Aleve helps some. She thinks she had tramadol at hospitals stay and it caused nausea and vomiting. This seems like neuropathic pain. Had amitriptyline before in the past for migraine PPx.   Will take gabapentin 300 mg in am and afternoon and hold night dose  Start amitriptyline 25 mg qhs  Recommend stool softener and miralax if constipated.     Hypokalemia on last labs  -BMP today      Current Outpatient Medications on File Prior to Visit   Medication Sig Dispense Refill    ascorbic acid (VITAMIN C) 500 MG tablet Take 500 mg by mouth once daily.      aspirin (ECOTRIN) 81 MG EC tablet Take 1 tablet (81 mg total) by mouth every evening.  0    calcium-vitamin D3 500 mg(1,250mg) -200 unit per tablet Take 1 tablet by mouth once daily.       cyanocobalamin, vitamin B-12, (VITAMIN B-12) 50 mcg tablet Take 50 mcg by mouth once daily.      fish oil-omega-3 fatty acids 300-1,000 mg capsule Take by mouth every evening.       FLAXSEED OIL ORAL Take by mouth once daily.      gabapentin (NEURONTIN) 300 MG capsule Take 1 capsule (300 mg total) by mouth nightly as needed. 90 capsule 0    lubiprostone (AMITIZA) 24 MCG Cap Take 1 capsule (24 mcg total) by mouth 2 (two) times daily with meals. 60 capsule 1    mesalamine (ASACOL) 800 mg TbEC Take 800 mg by mouth 2 (two) times daily.       milk thistle 175 mg tablet Take 175 mg by mouth 2 (two) times daily.       multivitamin capsule Take 1 capsule by mouth once daily.      sertraline (ZOLOFT) 100 MG tablet TAKE 1 TABLET BY MOUTH TWICE DAILY 180 tablet  1    TURMERIC ROOT EXTRACT ORAL Take by mouth every evening.       acyclovir 5% (ZOVIRAX) 5 % ointment Apply topically 5 (five) times daily. (Patient taking differently: Apply topically 5 (five) times daily. ) 15 g 1    conjugated estrogens (PREMARIN) vaginal cream Place 0.5 g vaginally 3 (three) times a week. 30 g 3    valACYclovir (VALTREX) 1000 MG tablet Take 1 tablet (1,000 mg total) by mouth daily as needed (outbreak). 10 tablet 0     No current facility-administered medications on file prior to visit.      I personally reviewed past medical, family and social history.  Review of Systems   Constitutional: Negative for activity change, fever and unexpected weight change.   HENT: Negative for facial swelling, hearing loss and trouble swallowing.    Eyes: Negative for visual disturbance.   Respiratory: Negative for cough, chest tightness, shortness of breath and wheezing.    Cardiovascular: Negative for chest pain, palpitations and leg swelling.   Gastrointestinal: Negative for abdominal pain, blood in stool, constipation, diarrhea, nausea and vomiting.   Endocrine: Negative for cold intolerance, polydipsia, polyphagia and polyuria.   Genitourinary: Negative for decreased urine volume and dysuria.   Musculoskeletal: Negative for gait problem and neck pain.   Skin: Negative for rash.   Neurological: Negative for dizziness, syncope and light-headedness.   Psychiatric/Behavioral: Negative for dysphoric mood. The patient is not nervous/anxious.        Objective:     Vitals:    03/28/19 1332   BP: (!) 100/58   Pulse: 84        Physical Exam   Constitutional: She is oriented to person, place, and time. She appears well-developed and well-nourished. No distress.   HENT:   Head: Normocephalic and atraumatic.   Eyes: Pupils are equal, round, and reactive to light. EOM are normal. Right eye exhibits no discharge. Left eye exhibits no discharge. No scleral icterus.   Neck: Normal range of motion. Neck supple. No JVD  present. No thyromegaly present.   Cardiovascular: Normal rate, regular rhythm and normal heart sounds. Exam reveals no gallop and no friction rub.   No murmur heard.  Pulmonary/Chest: Effort normal and breath sounds normal. No respiratory distress. She has no wheezes.   Abdominal: Soft. Bowel sounds are normal. She exhibits no distension and no mass. There is no tenderness.   Musculoskeletal: Normal range of motion. She exhibits no edema.   Lymphadenopathy:     She has no cervical adenopathy.   Neurological: She is alert and oriented to person, place, and time. No cranial nerve deficit. Coordination normal.   Skin: Skin is warm and dry. Capillary refill takes less than 2 seconds. No rash noted. She is not diaphoretic.   Psychiatric: She has a normal mood and affect. Her behavior is normal.       Assessment/Plan   Zara was seen today for numbness.    Diagnoses and all orders for this visit:    Paresthesia of both hands  -     amitriptyline (ELAVIL) 25 MG tablet; Take 1 tablet (25 mg total) by mouth nightly as needed for Insomnia.    Hypokalemia  -     Basic metabolic panel; Future

## 2019-03-29 ENCOUNTER — OFFICE VISIT (OUTPATIENT)
Dept: PAIN MEDICINE | Facility: CLINIC | Age: 74
End: 2019-03-29
Payer: MEDICARE

## 2019-03-29 VITALS
HEART RATE: 83 BPM | HEIGHT: 62 IN | SYSTOLIC BLOOD PRESSURE: 101 MMHG | DIASTOLIC BLOOD PRESSURE: 65 MMHG | BODY MASS INDEX: 28.03 KG/M2 | WEIGHT: 152.31 LBS

## 2019-03-29 DIAGNOSIS — M54.12 CERVICAL RADICULOPATHY: Primary | ICD-10-CM

## 2019-03-29 PROCEDURE — 1100F PTFALLS ASSESS-DOCD GE2>/YR: CPT | Mod: CPTII,S$GLB,, | Performed by: ANESTHESIOLOGY

## 2019-03-29 PROCEDURE — 1100F PR PT FALLS ASSESS DOC 2+ FALLS/FALL W/INJURY/YR: ICD-10-PCS | Mod: CPTII,S$GLB,, | Performed by: ANESTHESIOLOGY

## 2019-03-29 PROCEDURE — 99214 OFFICE O/P EST MOD 30 MIN: CPT | Mod: S$GLB,,, | Performed by: ANESTHESIOLOGY

## 2019-03-29 PROCEDURE — 99999 PR PBB SHADOW E&M-EST. PATIENT-LVL III: CPT | Mod: PBBFAC,,, | Performed by: ANESTHESIOLOGY

## 2019-03-29 PROCEDURE — 3078F DIAST BP <80 MM HG: CPT | Mod: CPTII,S$GLB,, | Performed by: ANESTHESIOLOGY

## 2019-03-29 PROCEDURE — 3074F SYST BP LT 130 MM HG: CPT | Mod: CPTII,S$GLB,, | Performed by: ANESTHESIOLOGY

## 2019-03-29 PROCEDURE — 3074F PR MOST RECENT SYSTOLIC BLOOD PRESSURE < 130 MM HG: ICD-10-PCS | Mod: CPTII,S$GLB,, | Performed by: ANESTHESIOLOGY

## 2019-03-29 PROCEDURE — 3288F PR FALLS RISK ASSESSMENT DOCUMENTED: ICD-10-PCS | Mod: CPTII,S$GLB,, | Performed by: ANESTHESIOLOGY

## 2019-03-29 PROCEDURE — 99999 PR PBB SHADOW E&M-EST. PATIENT-LVL III: ICD-10-PCS | Mod: PBBFAC,,, | Performed by: ANESTHESIOLOGY

## 2019-03-29 PROCEDURE — 99214 PR OFFICE/OUTPT VISIT, EST, LEVL IV, 30-39 MIN: ICD-10-PCS | Mod: S$GLB,,, | Performed by: ANESTHESIOLOGY

## 2019-03-29 PROCEDURE — 3078F PR MOST RECENT DIASTOLIC BLOOD PRESSURE < 80 MM HG: ICD-10-PCS | Mod: CPTII,S$GLB,, | Performed by: ANESTHESIOLOGY

## 2019-03-29 PROCEDURE — 3288F FALL RISK ASSESSMENT DOCD: CPT | Mod: CPTII,S$GLB,, | Performed by: ANESTHESIOLOGY

## 2019-03-29 NOTE — PROGRESS NOTES
[facility] Follow Up Evaluation    CC:   Chief Complaint   Patient presents with    Hand Pain     Extreme pain in both hands      Last 3 PDI Scores 8/21/2018   Pain Disability Index (PDI) 0       Interval HPI 3/29/19: Mrs. Ann returns to the office for follow up.  She was previous seen in October 2018 by Dr. Gomez for neck pain.  She subsequently underwent C5-C7 ACDF on 1/22/18.  Prior to surgery her main complaint was numbness in both of her hands which resolved with surgery.  About 2 weeks ago she reports she started to have pins and needle feeling in both of her hands.  + weakness.  She also feels like she has numbness that starts in her left hand and travels up her left arm stopping at the elbow.  No acute gait changes.  No bowel/bladder dysfunction.      Initial HPI:   The patient is a 73-year-old woman with a history of with a history of GERD, osteopenia, ulcer of colitis who presents in referral from Dr. ann for bilateral hand numbness.  Patient states that she made this appointment about 2 months ago when she woke up 1 day without any prior trauma or incidents in the recent past, complained of bilateral hand numbness.  She states that her hands went completely numb and she was having difficulty actually using her hands.  She denies any weakness in her hands, denied any pain throughout her arms or neck.  She states that over the next several days this pain resolved.  During the last month she actually developed about a of shingles and what seems to be a V1 and V2 distribution, she began taking some Gabapentin and this helped, she is no longer taking this, the hand numbness and tingling has not returned, she is not having any weakness in her arms, denies any major balance issues.  She does report that when she extends her neck she does not necessarily have pain but she does get lightheaded.  She reports a history of about 15 years ago having severe neck pain and her left arm went numb and  weak.  She states that she was concerned that she may have to have surgery but after 1.5 months of physical therapy, all of her strength returned and she has not had any issues with her neck or arms since then.    Previous Therapies:  PT/OT:   HEP:   Interventions:   Surgery: 1/22/18 - C5-C7 ACDF  Medications:   - NSAIDS:   - MSK Relaxants:   - TCAs:   - SNRIs:   - Topicals:   - Anticonvulsants:  - Opioids:     Current Pain Medications:  1. Gabapentin, amitriptyline      History:    Current Outpatient Medications:     acyclovir 5% (ZOVIRAX) 5 % ointment, Apply topically 5 (five) times daily. (Patient taking differently: Apply topically 5 (five) times daily. ), Disp: 15 g, Rfl: 1    amitriptyline (ELAVIL) 25 MG tablet, Take 1 tablet (25 mg total) by mouth nightly as needed for Insomnia., Disp: 30 tablet, Rfl: 1    ascorbic acid (VITAMIN C) 500 MG tablet, Take 500 mg by mouth once daily., Disp: , Rfl:     aspirin (ECOTRIN) 81 MG EC tablet, Take 1 tablet (81 mg total) by mouth every evening., Disp: , Rfl: 0    calcium-vitamin D3 500 mg(1,250mg) -200 unit per tablet, Take 1 tablet by mouth once daily. , Disp: , Rfl:     conjugated estrogens (PREMARIN) vaginal cream, Place 0.5 g vaginally 3 (three) times a week., Disp: 30 g, Rfl: 3    cyanocobalamin, vitamin B-12, (VITAMIN B-12) 50 mcg tablet, Take 50 mcg by mouth once daily., Disp: , Rfl:     FLAXSEED OIL ORAL, Take by mouth once daily., Disp: , Rfl:     gabapentin (NEURONTIN) 300 MG capsule, Take 1 capsule (300 mg total) by mouth nightly as needed., Disp: 90 capsule, Rfl: 0    lubiprostone (AMITIZA) 24 MCG Cap, Take 1 capsule (24 mcg total) by mouth 2 (two) times daily with meals., Disp: 60 capsule, Rfl: 1    mesalamine (ASACOL) 800 mg TbEC, Take 800 mg by mouth 2 (two) times daily. , Disp: , Rfl:     milk thistle 175 mg tablet, Take 175 mg by mouth 2 (two) times daily. , Disp: , Rfl:     multivitamin capsule, Take 1 capsule by mouth once daily., Disp: ,  Rfl:     sertraline (ZOLOFT) 100 MG tablet, TAKE 1 TABLET BY MOUTH TWICE DAILY, Disp: 180 tablet, Rfl: 1    TURMERIC ROOT EXTRACT ORAL, Take by mouth every evening. , Disp: , Rfl:     fish oil-omega-3 fatty acids 300-1,000 mg capsule, Take by mouth every evening. , Disp: , Rfl:     valACYclovir (VALTREX) 1000 MG tablet, Take 1 tablet (1,000 mg total) by mouth daily as needed (outbreak)., Disp: 10 tablet, Rfl: 0    Past Medical History:   Diagnosis Date    Cancer     left arm skin    Depression     Diverticulosis     Fatty liver     Genital herpes     GERD (gastroesophageal reflux disease)     Gout     History of use of hearing aid in left ear     Osteopenia     RAD (reactive airway disease)     Rheumatic fever     UC (ulcerative colitis)        Past Surgical History:   Procedure Laterality Date    APPENDECTOMY      CHOLECYSTECTOMY      COLONOSCOPY  2013    every 2 yrs    DECOMPRESSION AND FUSION, SPINE, CERVICAL, ANTERIOR APPROACH  C5-C7 ANTERIOR CERVICAL DECOMPRESSION AND FUSION N/A 10/22/2018    Performed [facility]    HYSTERECTOMY      parital - benign - fibroids    Rectocele  1983    with hysterectomy       Family History   Problem Relation Age of Onset    Cancer Mother         colon    Cancer Father         kidney     Ulcers Sister     Pancreatitis Sister     Breast cancer Sister     Liver cancer Paternal Aunt        Social History     Socioeconomic History    Marital status:      Spouse name: None    Number of children: None    Years of education: None    Highest education level: None   Occupational History    None   Social Needs    Financial resource strain: None    Food insecurity:     Worry: None     Inability: None    Transportation needs:     Medical: None     Non-medical: None   Tobacco Use    Smoking status: Never Smoker    Smokeless tobacco: Never Used   Substance and Sexual Activity    Alcohol use: Yes     Comment: rarely     Drug  "use: No    Sexual activity: None   Lifestyle    Physical activity:     Days per week: None     Minutes per session: None    Stress: None   Relationships    Social connections:     Talks on phone: None     Gets together: None     Attends [employer] service: None     Active member of club or organization: None     Attends meetings of clubs or organizations: None     Relationship status: None    Intimate partner violence:     Fear of current or ex partner: None     Emotionally abused: None     Physically abused: None     Forced sexual activity: None   Other Topics Concern    None   Social History Narrative    None       Review of patient's allergies indicates:   Allergen Reactions    Ciprofloxacin Shortness Of Breath    Clindamycin Shortness Of Breath    Nitrofurantoin monohyd/m-cryst Shortness Of Breath, Anaphylaxis and Itching    Penicillins Hives    Sulfa (sulfonamide antibiotics) Hives    Cephalosporins Hives    Hydrocod-cpm-pe-acetaminophen     Hydrocodone Itching    Hydrocortisone Nausea Only    Sulfamethoxazole-trimethoprim Itching       Review of Systems:  Review of Systems - General ROS: negative for - fever  Psychological ROS: negative for - hostility  Hematological and Lymphatic ROS: negative for - bleeding problems  Endocrine ROS: negative for - unexpected weight changes  Respiratory ROS: no cough, shortness of breath, or wheezing  Cardiovascular ROS: no chest pain or dyspnea on exertion  Gastrointestinal ROS: no abdominal pain, change in bowel habits, or black or bloody stools  Musculoskeletal ROS: negative for - muscular weakness  Neurological ROS: positive for - numbness/tingling, negative for bowel/bladder changes, no acute balance changes  Dermatological ROS: negative for rash    Physical Exam:  Vitals:    03/29/19 1001   BP: 101/65   Pulse: 83   Weight: 69.1 kg (152 lb 5.4 oz)   Height: 5' 2" (1.575 m)   PainSc:   8   PainLoc: Hand     Body mass index is 27.86 kg/m².     Gen: " NAD  Psych: mood appropriate for given condition  CV: RRR  HEENT: anicteric   Respiratory: non labored  Abd: soft nt, nd  Skin: intact  Sensation: intact to lt touch bilaterally in c4-t1   Reflexes: 2+ b/l Bicep, tricep, and 2+ left patella and 3+ right patella, Martin negative  ROM: Cervical ROM full, shoulder, elbow and wrist ROM full, no scapular dysmotility   Tone:  Normal at elbow, wrist and shoulder   Inspection: no atrophy of bicep, FDI or APB noted, no scapular winging  Special tests: Cantu's negative bilaterally, Phalen's negative  Palpation: tender cervical paraspinals, levator scapula and trapezius non tender bicipital tendon    Motor:    Right Left   C4 Shoulder Abduction  5  5   C5 Elbow Flexion    5  5   C6 Wrist Extension  5  5   C7 Elbow Extension   5  5   C8/T1 Hand Intrinsics   5-  5-                   Imaging:  Xray cervical spine 10/23/19  FINDINGS  There are postoperative osseous and soft tissue changes present with interval anterior plate and screw fixation as well as disc space material at C5 through C7.  Osseous alignment appears maintained with no interval displaced fracture or dislocation appreciated.  There is mild chronic appearing disc space narrowing at C4-5 similarly.  No other significant interval changes are appreciated.    Labs:  BMP  Lab Results   Component Value Date     03/28/2019    K 4.4 03/28/2019     03/28/2019    CO2 25 03/28/2019    BUN 15 03/28/2019    CREATININE 0.8 03/28/2019    CALCIUM 9.3 03/28/2019    ANIONGAP 10 03/28/2019    ESTGFRAFRICA >60.0 03/28/2019    EGFRNONAA >60.0 03/28/2019     Lab Results   Component Value Date    ALT 24 10/09/2018    AST 26 10/09/2018    ALKPHOS 84 10/09/2018    BILITOT 1.0 10/09/2018       Assessment:  Problem List Items Addressed This Visit        Neuro    Cervical radiculopathy - Primary    Relevant Orders    MRI Cervical Spine Without Contrast    EMG W/ ULTRASOUND AND NERVE CONDUCTION TEST 2 Extremities          Treatment  Plan:  74 y.o. year old female with PMH GERD, depression, gout presents to the office today for follow up.  She was previous seen in October 2018 by Dr. Gomez for neck pain.  She subsequently underwent C5-C7 ACDF on 1/22/18.  Prior to surgery her main complaint was numbness in both of her hands which resolved with surgery.  About 2 weeks ago she reports she started to have pins and needle feeling in both of her hands.  + weakness.  She also feels like she has numbness that starts in her left hand and travels up her left arm stopping at the elbow.  No acute gait changes.  No bowel/bladder dysfunction.  On exam she has 5/5 strength b/l LE and UE with 5- b/l hand intrinsics, 2+ biceps, triceps, negative hoffmans.  Sensation intact to light touch C4-T1.  She has 2+ left patella and 3+ right patellar DTR.  Phalens is negative.  She has just started gabapentin and amitriptyline that was prescribed by her PCP yesterday.  Recommend to continue those medications as she just started them yesterday and they will take a couple weeks to see how effective they are for her neuropathic pain.  Given history of ACDF and new numbness will get new cervical MRI.  I will also get UE EMG to assess if his neuropathy is related to her cervical spine or peripheral such as carpal tunnel.  I have asked her to follow up with Dr. Caro's office for evaluation.  She can follow up with me after she see nsgy and completes imaging/EMG.    Procedures: none at this time  Medications: continue gabapentin and amitriptyline as prescribed  Labs: Reviewed and medications are appropriately dosed for current hepatorenal function.  Imaging: MRI cervical spine, EMG b/l UE's    : Not applicable    Brennan Sparrow M.D.  Interventional Pain Medicine / Anesthesiology

## 2019-04-01 DIAGNOSIS — M79.671 RIGHT FOOT PAIN: Primary | ICD-10-CM

## 2019-04-02 ENCOUNTER — HOSPITAL ENCOUNTER (OUTPATIENT)
Dept: RADIOLOGY | Facility: HOSPITAL | Age: 74
Discharge: HOME | End: 2019-04-02
Attending: NURSE PRACTITIONER
Payer: MEDICARE

## 2019-04-02 ENCOUNTER — TELEPHONE (OUTPATIENT)
Dept: PHARMACY | Facility: CLINIC | Age: 74
End: 2019-04-02

## 2019-04-02 ENCOUNTER — HOSPITAL ENCOUNTER (OUTPATIENT)
Dept: RADIOLOGY | Facility: HOSPITAL | Age: 74
Discharge: HOME | End: 2019-04-02
Attending: ORTHOPAEDIC SURGERY
Payer: MEDICARE

## 2019-04-02 ENCOUNTER — OFFICE VISIT (OUTPATIENT)
Dept: ORTHOPEDICS | Facility: CLINIC | Age: 74
End: 2019-04-02
Payer: MEDICARE

## 2019-04-02 VITALS — HEIGHT: 62 IN | BODY MASS INDEX: 27.97 KG/M2 | WEIGHT: 152 LBS

## 2019-04-02 DIAGNOSIS — M79.671 RIGHT FOOT PAIN: ICD-10-CM

## 2019-04-02 DIAGNOSIS — M79.642 BILATERAL HAND PAIN: ICD-10-CM

## 2019-04-02 DIAGNOSIS — M79.642 BILATERAL HAND PAIN: Primary | ICD-10-CM

## 2019-04-02 DIAGNOSIS — M79.641 BILATERAL HAND PAIN: ICD-10-CM

## 2019-04-02 DIAGNOSIS — M79.641 BILATERAL HAND PAIN: Primary | ICD-10-CM

## 2019-04-02 PROCEDURE — 73130 X-RAY EXAM OF HAND: CPT | Mod: 50,TC,PO

## 2019-04-02 PROCEDURE — 99999 PR PBB SHADOW E&M-EST. PATIENT-LVL II: ICD-10-PCS | Mod: PBBFAC,,, | Performed by: NURSE PRACTITIONER

## 2019-04-02 PROCEDURE — 99203 OFFICE O/P NEW LOW 30 MIN: CPT | Mod: S$GLB,,, | Performed by: NURSE PRACTITIONER

## 2019-04-02 PROCEDURE — 99203 PR OFFICE/OUTPT VISIT, NEW, LEVL III, 30-44 MIN: ICD-10-PCS | Mod: S$GLB,,, | Performed by: NURSE PRACTITIONER

## 2019-04-02 PROCEDURE — 73130 XR HAND COMPLETE 3 VIEWS BILATERAL: ICD-10-PCS | Mod: 26,50,, | Performed by: RADIOLOGY

## 2019-04-02 PROCEDURE — 99999 PR PBB SHADOW E&M-EST. PATIENT-LVL II: CPT | Mod: PBBFAC,,, | Performed by: NURSE PRACTITIONER

## 2019-04-02 PROCEDURE — 73130 X-RAY EXAM OF HAND: CPT | Mod: 26,50,, | Performed by: RADIOLOGY

## 2019-04-02 RX ORDER — DICLOFENAC SODIUM 10 MG/G
2 GEL TOPICAL DAILY
Qty: 1 TUBE | Refills: 6 | Status: SHIPPED | OUTPATIENT
Start: 2019-04-02 | End: 2019-04-30

## 2019-04-02 RX ORDER — METHYLPREDNISOLONE 4 MG/1
TABLET ORAL
Qty: 21 TABLET | Refills: 0 | Status: SHIPPED | OUTPATIENT
Start: 2019-04-02 | End: 2019-04-23

## 2019-04-02 NOTE — PROGRESS NOTES
DATE: 4/2/2019  PATIENT: Zara Ann  REFERRING MD:   CHIEF COMPLAINT:   Chief Complaint   Patient presents with    Right Hand - Pain    Left Hand - Pain       HISTORY:  Zara Ann is a 74 y.o. female  who presents for initial evaluation of her bilateral hand pain, she is right hand dominant.  She complains of bilateral hand burning that begins as pins and needles about her fingertips then increases to feeling like her hands are dipped in boiling oil and then set on fire.  She has tried gabapentin, aspercreme and elavil.  She reports the pain has been going on for almost 2 weeks.  The pain is 4/10 today but she reports it increases as the day goes on and gets to a 10/10.  She reports she had her first night of sleep last night which she believes is from starting her elavil.  She has not had any injuries.  She did do some yard work since the weather has been nice which may have caused the pain flare.  She had cervical spine surgery with Dr Caro last October, she is scheduled for a cervical spine MRI this week and she reports she will have a follow up scheduled after the MRI.    PAST MEDICAL/SURGICAL HISTORY:  Past Medical History:   Diagnosis Date    Cancer     left arm skin    Depression     Diverticulosis     Fatty liver     Genital herpes     GERD (gastroesophageal reflux disease)     Gout     History of use of hearing aid in left ear     Osteopenia     RAD (reactive airway disease)     Rheumatic fever     UC (ulcerative colitis)      Past Surgical History:   Procedure Laterality Date    APPENDECTOMY      CHOLECYSTECTOMY      COLONOSCOPY  2013    every 2 yrs    DECOMPRESSION AND FUSION, SPINE, CERVICAL, ANTERIOR APPROACH  C5-C7 ANTERIOR CERVICAL DECOMPRESSION AND FUSION N/A 10/22/2018    Performed by Demetri Caro MD at [facility] OR    HYSTERECTOMY      parital - benign - fibroids    Rectocele  1983    with hysterectomy       Current Medications:   Current Outpatient Medications:      acyclovir 5% (ZOVIRAX) 5 % ointment, Apply topically 5 (five) times daily. (Patient taking differently: Apply topically 5 (five) times daily. ), Disp: 15 g, Rfl: 1    amitriptyline (ELAVIL) 25 MG tablet, Take 1 tablet (25 mg total) by mouth nightly as needed for Insomnia., Disp: 30 tablet, Rfl: 1    ascorbic acid (VITAMIN C) 500 MG tablet, Take 500 mg by mouth once daily., Disp: , Rfl:     aspirin (ECOTRIN) 81 MG EC tablet, Take 1 tablet (81 mg total) by mouth every evening., Disp: , Rfl: 0    calcium-vitamin D3 500 mg(1,250mg) -200 unit per tablet, Take 1 tablet by mouth once daily. , Disp: , Rfl:     conjugated estrogens (PREMARIN) vaginal cream, Place 0.5 g vaginally 3 (three) times a week., Disp: 30 g, Rfl: 3    cyanocobalamin, vitamin B-12, (VITAMIN B-12) 50 mcg tablet, Take 50 mcg by mouth once daily., Disp: , Rfl:     fish oil-omega-3 fatty acids 300-1,000 mg capsule, Take by mouth every evening. , Disp: , Rfl:     FLAXSEED OIL ORAL, Take by mouth once daily., Disp: , Rfl:     gabapentin (NEURONTIN) 300 MG capsule, Take 1 capsule (300 mg total) by mouth nightly as needed., Disp: 90 capsule, Rfl: 0    lubiprostone (AMITIZA) 24 MCG Cap, Take 1 capsule (24 mcg total) by mouth 2 (two) times daily with meals., Disp: 60 capsule, Rfl: 1    mesalamine (ASACOL) 800 mg TbEC, Take 800 mg by mouth 2 (two) times daily. , Disp: , Rfl:     milk thistle 175 mg tablet, Take 175 mg by mouth 2 (two) times daily. , Disp: , Rfl:     multivitamin capsule, Take 1 capsule by mouth once daily., Disp: , Rfl:     sertraline (ZOLOFT) 100 MG tablet, TAKE 1 TABLET BY MOUTH TWICE DAILY, Disp: 180 tablet, Rfl: 1    TURMERIC ROOT EXTRACT ORAL, Take by mouth every evening. , Disp: , Rfl:     diclofenac sodium (VOLTAREN) 1 % Gel, Apply 2 g topically once daily., Disp: 1 Tube, Rfl: 6    methylPREDNISolone (MEDROL DOSEPACK) 4 mg tablet, use as directed, Disp: 1 Package, Rfl: 0    valACYclovir (VALTREX) 1000 MG tablet, Take  1 tablet (1,000 mg total) by mouth daily as needed (outbreak)., Disp: 10 tablet, Rfl: 0    Family History: family history was reviewed and is noncontributory  Social History:   Social History     Socioeconomic History    Marital status:      Spouse name: Not on file    Number of children: Not on file    Years of education: Not on file    Highest education level: Not on file   Occupational History    Not on file   Social Needs    Financial resource strain: Not on file    Food insecurity:     Worry: Not on file     Inability: Not on file    Transportation needs:     Medical: Not on file     Non-medical: Not on file   Tobacco Use    Smoking status: Never Smoker    Smokeless tobacco: Never Used   Substance and Sexual Activity    Alcohol use: Yes     Comment: rarely     Drug use: No    Sexual activity: Not on file   Lifestyle    Physical activity:     Days per week: Not on file     Minutes per session: Not on file    Stress: Not on file   Relationships    Social connections:     Talks on phone: Not on file     Gets together: Not on file     Attends Zoroastrianism service: Not on file     Active member of club or organization: Not on file     Attends meetings of clubs or organizations: Not on file     Relationship status: Not on file    Intimate partner violence:     Fear of current or ex partner: Not on file     Emotionally abused: Not on file     Physically abused: Not on file     Forced sexual activity: Not on file   Other Topics Concern    Not on file   Social History Narrative    Not on file       ROS:  Constitution: Negative for chills, fever, and sweats. Negative for unexplained weight loss.  Eyes: no redness, no discharge  Ears: no ear pain or tinnitus  Cardiovascular: Negative for chest pain, irregular heartbeat, leg swelling and palpitations.   Respiratory: Negative for cough and shortness of breath.   Gastrointestinal: Negative for abdominal pain, nausea and vomiting.   Genitourinary:  "Negative for bladder incontinence and dysuria.   Neurological: Negative for numbness.   Psychiatric/Behavioral: Negative for behavior changes.   Endocrine: Negative for palpitations.   Hematologic/Lymphatic: Negative for bleeding disorders.  Skin: Negative for pruritis or rash.       PHYSICAL EXAM:  Ht 5' 2" (1.575 m)   Wt 68.9 kg (152 lb)   BMI 27.80 kg/m²   Healthy appearing 74 year old female in no acute distress.  Examination of the bilateral hand reveals:  No edema, effusion, ecchymosis or obvious deformity  Nontender to palpation   ROM full  Strength 5/5  Negative Tinnel's   No gross instability  Sensation intact  Skin warm, dry, intact      IMAGING:   X-ray obtained Bilateral hand performed today personally reviewed with patient. Radiologist report as follows:   No acute fracture or malalignment.  Right hand degenerative change mild at the triscaphe joint moderate at the 1st CMC joint moderate at the 2nd DIP joint and mild at scattered other IP joints.  Osteopenia.  Left hand degenerative change mild at the triscaphe joint, moderate at the 1st CMC joint moderate at the 2nd and 3rd DIP joints and mild at several other IP joints.  No erosive change.    ASSESSMENT:   Bilateral hand pain  Possible cervical spine radiculopathy    PLAN:  The nature of the diagnosis, using models and diagrams when appropriate, was explained to the patient in detail. Treatment option discussed included non-operative measures of rest,  modification of activities, over the counter pain/antiinflammatory relief, physica/occupational therapy, cortisone injection and medrol dose pack.  More aggressive treatment options include referal to Dr Kline.  All questions answered and the patient wishes to proceed today with medrol dose pack and voltaren gel.  Follow up if no improvement or worsening of symptoms.        "

## 2019-04-02 NOTE — LETTER
April 2, 2019      Jemal Hart MD  [address]  [facility] [address]           [facility]  [address] [facility] [address] [place] [address]  Phone: [phone]          Patient: Zara Ann   MR Number: [id number]   YOB: 1945   Date of Visit: 4/2/2019       Dear Dr. Jemal Hart:    Thank you for referring Zara Ann to me for evaluation. Attached you will find relevant portions of my assessment and plan of care.    If you have questions, please do not hesitate to call me. I look forward to following Zara Ann along with you.    Sincerely,    Meena Guido, APRN    Enclosure  CC:  No Recipients    If you would like to receive this communication electronically, please contact [email or web address] or [phone] to request more information on [employer] Link access.    For providers and/or their staff who would like to refer a patient to [facility], please contact us through our one-stop-shop provider referral line, [facility] , at [phone].    If you feel you have received this communication in error or would no longer like to receive these types of communications, please e-mail [email or web address]

## 2019-04-02 NOTE — TELEPHONE ENCOUNTER
Spoke with patient notifying her PA submitted for Diclofenac Sodium 1% Gel today.    Explained PA can take up to 72 hours.  If approved PT wants rx mailed to her home.    Thanks,   Radha Ervin CPhT, B.A  Patient Care Advocate   [facility]   Phone: [phone] Ext [phone]  Fax: [phone]

## 2019-04-04 ENCOUNTER — TELEPHONE (OUTPATIENT)
Dept: ADMINISTRATIVE | Facility: OTHER | Age: 74
End: 2019-04-04

## 2019-04-04 NOTE — TELEPHONE ENCOUNTER
----- Message from Razia Jackson RN sent at 4/4/2019 12:04 PM CDT -----      ----- Message -----  From: Sidra Agulia RN  Sent: 10/3/2018  12:53 PM  To: Sidra Aguila RN, Razia Jackson RN, #    3 mo phone FU; C5-C7 ACDF

## 2019-04-06 ENCOUNTER — HOSPITAL ENCOUNTER (OUTPATIENT)
Dept: RADIOLOGY | Facility: HOSPITAL | Age: 74
Discharge: HOME | End: 2019-04-06
Attending: FAMILY MEDICINE
Payer: MEDICARE

## 2019-04-06 DIAGNOSIS — M54.12 CERVICAL RADICULOPATHY: ICD-10-CM

## 2019-04-06 PROCEDURE — 72141 MRI NECK SPINE W/O DYE: CPT | Mod: TC,PO

## 2019-04-06 PROCEDURE — 72141 MRI CERVICAL SPINE WITHOUT CONTRAST: ICD-10-PCS | Mod: 26,,, | Performed by: RADIOLOGY

## 2019-04-06 PROCEDURE — 72141 MRI NECK SPINE W/O DYE: CPT | Mod: 26,,, | Performed by: RADIOLOGY

## 2019-04-10 ENCOUNTER — OFFICE VISIT (OUTPATIENT)
Dept: ALLERGY | Facility: CLINIC | Age: 74
End: 2019-04-10
Payer: MEDICARE

## 2019-04-10 VITALS — BODY MASS INDEX: 27.55 KG/M2 | OXYGEN SATURATION: 97 % | HEART RATE: 80 BPM | HEIGHT: 62 IN | WEIGHT: 149.69 LBS

## 2019-04-10 DIAGNOSIS — Z88.9 DRUG ALLERGY: ICD-10-CM

## 2019-04-10 DIAGNOSIS — Z88.0 PENICILLIN ALLERGY: Primary | ICD-10-CM

## 2019-04-10 PROCEDURE — 99204 PR OFFICE/OUTPT VISIT, NEW, LEVL IV, 45-59 MIN: ICD-10-PCS | Mod: S$GLB,,, | Performed by: ALLERGY & IMMUNOLOGY

## 2019-04-10 PROCEDURE — 99204 OFFICE O/P NEW MOD 45 MIN: CPT | Mod: S$GLB,,, | Performed by: ALLERGY & IMMUNOLOGY

## 2019-04-10 PROCEDURE — 1100F PTFALLS ASSESS-DOCD GE2>/YR: CPT | Mod: CPTII,S$GLB,, | Performed by: ALLERGY & IMMUNOLOGY

## 2019-04-10 PROCEDURE — 3288F FALL RISK ASSESSMENT DOCD: CPT | Mod: CPTII,S$GLB,, | Performed by: ALLERGY & IMMUNOLOGY

## 2019-04-10 PROCEDURE — 99999 PR PBB SHADOW E&M-EST. PATIENT-LVL III: CPT | Mod: PBBFAC,,, | Performed by: ALLERGY & IMMUNOLOGY

## 2019-04-10 PROCEDURE — 3288F PR FALLS RISK ASSESSMENT DOCUMENTED: ICD-10-PCS | Mod: CPTII,S$GLB,, | Performed by: ALLERGY & IMMUNOLOGY

## 2019-04-10 PROCEDURE — 1100F PR PT FALLS ASSESS DOC 2+ FALLS/FALL W/INJURY/YR: ICD-10-PCS | Mod: CPTII,S$GLB,, | Performed by: ALLERGY & IMMUNOLOGY

## 2019-04-10 PROCEDURE — 99999 PR PBB SHADOW E&M-EST. PATIENT-LVL III: ICD-10-PCS | Mod: PBBFAC,,, | Performed by: ALLERGY & IMMUNOLOGY

## 2019-04-10 NOTE — LETTER
April 10, 2019      Jemal Hart MD  [address]  [facility] [address]           [facility]  [address] [facility] [address] [place] [address]  Phone: [phone]          Patient: Zara Ann   MR Number: [id number]   YOB: 1945   Date of Visit: 4/10/2019       Dear Dr. Jemal Hart:    Thank you for referring Zara Ann to me for evaluation. Attached you will find relevant portions of my assessment and plan of care.    If you have questions, please do not hesitate to call me. I look forward to following Zara Ann along with you.    Sincerely,    Glory Hutchins MD    Enclosure  CC:  No Recipients    If you would like to receive this communication electronically, please contact [email or web address] or [phone] to request more information on [employer] access.    For providers and/or their staff who would like to refer a patient to [facility], please contact us through our one-stop-shop provider referral line, [facility], at [phone].    If you feel you have received this communication in error or would no longer like to receive these types of communications, please e-mail [email or web address]

## 2019-04-10 NOTE — PROGRESS NOTES
Subjective:       Patient ID: Zara Ann is a 74 y.o. female.    Chief Complaint:  Allergies (medication allergies, referred to figure out what medication she can take. )      73 yo woman presents for new patient evaluation of drug allergies. She states she reacts to many meds, pain meds cause nausea, severe constipation and sedation. She tries to avoid. She also has had reaction to several antibiotics. At 11 she had rheumatic fever. She was on PCN for a year and then sulfa for a year. She was fine. As young adult had PCN again and had itching and hives. She does not thinks blisters or SOB. She cannot remember having reaction to cephalosporins, thinks she has taken keflex without issue but has cephalosporins listed as an allergy. She was prone to UTI and last year was on Cipro which she took many times and H ad significant chest pressure and SOB so avoid that now. Macrobid also caused chest pressure and SOB. Clindamycin, not sure reaction. She was advised needs to know if allergic to these. She has no food or insect allergy. No rhinitis or asthma or eczema      Environmental History: see history section for home environment  Review of Systems   Constitutional: Negative for appetite change, chills, fatigue and fever.   HENT: Negative for congestion, ear discharge, ear pain, facial swelling, nosebleeds, postnasal drip, rhinorrhea, sinus pressure, sneezing, sore throat, trouble swallowing and voice change.    Eyes: Negative for discharge, redness, itching and visual disturbance.   Respiratory: Negative for cough, choking, chest tightness, shortness of breath and wheezing.    Cardiovascular: Negative for chest pain, palpitations and leg swelling.   Gastrointestinal: Negative for abdominal distention, abdominal pain, constipation, diarrhea, nausea and vomiting.   Genitourinary: Negative for difficulty urinating.   Musculoskeletal: Positive for arthralgias. Negative for gait problem, joint swelling and myalgias.    Skin: Positive for color change and rash.   Neurological: Positive for numbness. Negative for dizziness, syncope, weakness, light-headedness and headaches.   Hematological: Negative for adenopathy. Does not bruise/bleed easily.   Psychiatric/Behavioral: Negative for agitation, behavioral problems, confusion and sleep disturbance. The patient is not nervous/anxious.         Objective:      Physical Exam   Constitutional: She is oriented to person, place, and time. She appears well-developed and well-nourished. No distress.   HENT:   Head: Normocephalic and atraumatic.   Right Ear: Hearing, tympanic membrane, external ear and ear canal normal.   Left Ear: Hearing, tympanic membrane, external ear and ear canal normal.   Nose: No mucosal edema, rhinorrhea, sinus tenderness or septal deviation. No epistaxis. Right sinus exhibits no maxillary sinus tenderness and no frontal sinus tenderness. Left sinus exhibits no maxillary sinus tenderness and no frontal sinus tenderness.   Mouth/Throat: Uvula is midline, oropharynx is clear and moist and mucous membranes are normal. No uvula swelling.   Eyes: Conjunctivae are normal. Right eye exhibits no discharge. Left eye exhibits no discharge.   Neck: Normal range of motion. No thyromegaly present.   Cardiovascular: Normal rate, regular rhythm and normal heart sounds.   No murmur heard.  Pulmonary/Chest: Effort normal and breath sounds normal. No respiratory distress. She has no wheezes.   Abdominal: Soft. She exhibits no distension. There is no tenderness.   Musculoskeletal: Normal range of motion. She exhibits no edema or tenderness.   Lymphadenopathy:     She has no cervical adenopathy.   Neurological: She is alert and oriented to person, place, and time.   Skin: Skin is warm and dry. No rash noted. No erythema.   Psychiatric: She has a normal mood and affect. Her behavior is normal. Judgment and thought content normal.   Nursing note and vitals reviewed.      Laboratory:    none performed   Assessment:       1. Penicillin allergy    2. Drug allergy         Plan:       1. Advised pt there is no standard test for most medications except PCN. Will plan for PCN test and challenge, if negative then consider challenge with cephalosporins as well

## 2019-04-11 ENCOUNTER — TELEPHONE (OUTPATIENT)
Dept: PAIN MEDICINE | Facility: CLINIC | Age: 74
End: 2019-04-11

## 2019-04-11 NOTE — TELEPHONE ENCOUNTER
----- Message from Brennan Sparrow MD sent at 4/9/2019 11:53 AM CDT -----  Please let Mrs. Ann know that I have reviewed her MRI.  She doesn't have any worsening of the narrowing in her neck and imaging appears stable since surgery.  I would like to her to get the EMG that I ordered and she can follow up with me in the office once she has done it to review.

## 2019-04-30 ENCOUNTER — OFFICE VISIT (OUTPATIENT)
Dept: PAIN MEDICINE | Facility: CLINIC | Age: 74
End: 2019-04-30
Payer: MEDICARE

## 2019-04-30 VITALS — HEIGHT: 62 IN | BODY MASS INDEX: 27.38 KG/M2 | WEIGHT: 148.81 LBS

## 2019-04-30 DIAGNOSIS — G56.03 BILATERAL CARPAL TUNNEL SYNDROME: Primary | ICD-10-CM

## 2019-04-30 PROCEDURE — 3288F FALL RISK ASSESSMENT DOCD: CPT | Mod: CPTII,S$GLB,, | Performed by: ANESTHESIOLOGY

## 2019-04-30 PROCEDURE — 1100F PR PT FALLS ASSESS DOC 2+ FALLS/FALL W/INJURY/YR: ICD-10-PCS | Mod: CPTII,S$GLB,, | Performed by: ANESTHESIOLOGY

## 2019-04-30 PROCEDURE — 99213 PR OFFICE/OUTPT VISIT, EST, LEVL III, 20-29 MIN: ICD-10-PCS | Mod: S$GLB,,, | Performed by: ANESTHESIOLOGY

## 2019-04-30 PROCEDURE — 99499 UNLISTED E&M SERVICE: CPT | Mod: S$GLB,,, | Performed by: ANESTHESIOLOGY

## 2019-04-30 PROCEDURE — 99999 PR PBB SHADOW E&M-EST. PATIENT-LVL III: CPT | Mod: PBBFAC,,, | Performed by: ANESTHESIOLOGY

## 2019-04-30 PROCEDURE — 99213 OFFICE O/P EST LOW 20 MIN: CPT | Mod: S$GLB,,, | Performed by: ANESTHESIOLOGY

## 2019-04-30 PROCEDURE — 99999 PR PBB SHADOW E&M-EST. PATIENT-LVL III: ICD-10-PCS | Mod: PBBFAC,,, | Performed by: ANESTHESIOLOGY

## 2019-04-30 PROCEDURE — 99499 RISK ADDL DX/OHS AUDIT: ICD-10-PCS | Mod: S$GLB,,, | Performed by: ANESTHESIOLOGY

## 2019-04-30 PROCEDURE — 3288F PR FALLS RISK ASSESSMENT DOCUMENTED: ICD-10-PCS | Mod: CPTII,S$GLB,, | Performed by: ANESTHESIOLOGY

## 2019-04-30 PROCEDURE — 1100F PTFALLS ASSESS-DOCD GE2>/YR: CPT | Mod: CPTII,S$GLB,, | Performed by: ANESTHESIOLOGY

## 2019-04-30 NOTE — PROGRESS NOTES
[facility] Follow Up Evaluation    CC:   Chief Complaint   Patient presents with    Neck Pain     pins and needles in emily hands      Last 3 PDI Scores 8/21/2018   Pain Disability Index (PDI) 0     Interval HPI 4/30/19: Mrs. Ann returns to the office for follow up.  Today she continues to have numbness in both of her hands.  She was given prednisone recently that provided her with good relief lasting about a week.    Interval HPI 3/29/19: Mrs. Ann returns to the office for follow up.  She was previous seen in October 2018 by Dr. Gomez for neck pain.  She subsequently underwent C5-C7 ACDF on 1/22/18.  Prior to surgery her main complaint was numbness in both of her hands which resolved with surgery.  About 2 weeks ago she reports she started to have pins and needle feeling in both of her hands.  + weakness.  She also feels like she has numbness that starts in her left hand and travels up her left arm stopping at the elbow.  No acute gait changes.  No bowel/bladder dysfunction.      Initial HPI:   The patient is a 73-year-old woman with a history of with a history of GERD, osteopenia, ulcer of colitis who presents in referral from Dr. ann for bilateral hand numbness.  Patient states that she made this appointment about 2 months ago when she woke up 1 day without any prior trauma or incidents in the recent past, complained of bilateral hand numbness.  She states that her hands went completely numb and she was having difficulty actually using her hands.  She denies any weakness in her hands, denied any pain throughout her arms or neck.  She states that over the next several days this pain resolved.  During the last month she actually developed about a of shingles and what seems to be a V1 and V2 distribution, she began taking some Gabapentin and this helped, she is no longer taking this, the hand numbness and tingling has not returned, she is not having any weakness in her arms, denies any major  balance issues.  She does report that when she extends her neck she does not necessarily have pain but she does get lightheaded.  She reports a history of about 15 years ago having severe neck pain and her left arm went numb and weak.  She states that she was concerned that she may have to have surgery but after 1.5 months of physical therapy, all of her strength returned and she has not had any issues with her neck or arms since then.    Previous Therapies:  PT/OT:   HEP:   Interventions:   Surgery: 1/22/18 - C5-C7 ACDF  Medications:   - NSAIDS:   - MSK Relaxants:   - TCAs:   - SNRIs:   - Topicals:   - Anticonvulsants:  - Opioids:     Current Pain Medications:  1. Gabapentin, amitriptyline      History:    Current Outpatient Medications:     acyclovir 5% (ZOVIRAX) 5 % ointment, Apply topically 5 (five) times daily. (Patient taking differently: Apply topically 5 (five) times daily. ), Disp: 15 g, Rfl: 1    amitriptyline (ELAVIL) 25 MG tablet, Take 1 tablet (25 mg total) by mouth nightly as needed for Insomnia., Disp: 30 tablet, Rfl: 1    ascorbic acid (VITAMIN C) 500 MG tablet, Take 500 mg by mouth once daily., Disp: , Rfl:     aspirin (ECOTRIN) 81 MG EC tablet, Take 1 tablet (81 mg total) by mouth every evening., Disp: , Rfl: 0    calcium-vitamin D3 500 mg(1,250mg) -200 unit per tablet, Take 1 tablet by mouth once daily. , Disp: , Rfl:     cyanocobalamin, vitamin B-12, (VITAMIN B-12) 50 mcg tablet, Take 50 mcg by mouth once daily., Disp: , Rfl:     fish oil-omega-3 fatty acids 300-1,000 mg capsule, Take by mouth every evening. , Disp: , Rfl:     FLAXSEED OIL ORAL, Take by mouth once daily., Disp: , Rfl:     mesalamine (ASACOL) 800 mg TbEC, Take 800 mg by mouth 2 (two) times daily. , Disp: , Rfl:     milk thistle 175 mg tablet, Take 175 mg by mouth 2 (two) times daily. , Disp: , Rfl:     multivitamin capsule, Take 1 capsule by mouth once daily., Disp: , Rfl:     sertraline (ZOLOFT) 100 MG tablet, TAKE 1  TABLET BY MOUTH TWICE DAILY, Disp: 180 tablet, Rfl: 1    TURMERIC ROOT EXTRACT ORAL, Take by mouth every evening. , Disp: , Rfl:     gabapentin (NEURONTIN) 300 MG capsule, Take 1 capsule (300 mg total) by mouth nightly as needed., Disp: 90 capsule, Rfl: 0    valACYclovir (VALTREX) 1000 MG tablet, Take 1 tablet (1,000 mg total) by mouth daily as needed (outbreak)., Disp: 10 tablet, Rfl: 0    Past Medical History:   Diagnosis Date    Cancer     left arm skin    Depression     Diverticulosis     Fatty liver     Genital herpes     GERD (gastroesophageal reflux disease)     Gout     History of use of hearing aid in left ear     Osteopenia     RAD (reactive airway disease)     Rheumatic fever     UC (ulcerative colitis)        Past Surgical History:   Procedure Laterality Date    APPENDECTOMY      CHOLECYSTECTOMY      COLONOSCOPY  2013    every 2 yrs    DECOMPRESSION AND FUSION, SPINE, CERVICAL, ANTERIOR APPROACH  C5-C7 ANTERIOR CERVICAL DECOMPRESSION AND FUSION N/A 10/22/2018    Per[facility]    HYSTERECTOMY      parital - benign - fibroids    Rectocele  1983    with hysterectomy       Family History   Problem Relation Age of Onset    Cancer Mother         colon    Cancer Father         kidney     Ulcers Sister     Pancreatitis Sister     Breast cancer Sister     Liver cancer Paternal Aunt     Allergic rhinitis Neg Hx     Allergies Neg Hx     Angioedema Neg Hx     Asthma Neg Hx     Atopy Neg Hx     Eczema Neg Hx     Rhinitis Neg Hx     Urticaria Neg Hx     Immunodeficiency Neg Hx        Social History     Socioeconomic History    Marital status:      Spouse name: Not on file    Number of children: Not on file    Years of education: Not on file    Highest education level: Not on file   Occupational History    Not on file   Social Needs    Financial resource strain: Not on file    Food insecurity:     Worry: Not on file     Inability: Not on  file    Transportation needs:     Medical: Not on file     Non-medical: Not on file   Tobacco Use    Smoking status: Never Smoker    Smokeless tobacco: Never Used   Substance and Sexual Activity    Alcohol use: Yes     Comment: rarely     Drug use: No    Sexual activity: Not on file   Lifestyle    Physical activity:     Days per week: Not on file     Minutes per session: Not on file    Stress: Not on file   Relationships    Social connections:     Talks on phone: Not on file     Gets together: Not on file     Attends Faith service: Not on file     Active member of club or organization: Not on file     Attends meetings of clubs or organizations: Not on file     Relationship status: Not on file   Other Topics Concern    Not on file   Social History Narrative    Not on file       Review of patient's allergies indicates:   Allergen Reactions    Ciprofloxacin Shortness Of Breath    Clindamycin Shortness Of Breath    Nitrofurantoin monohyd/m-cryst Shortness Of Breath, Anaphylaxis and Itching    Penicillins Hives    Sulfa (sulfonamide antibiotics) Hives    Cephalosporins Hives    Hydrocod-cpm-pe-acetaminophen     Hydrocodone Itching    Hydrocortisone Nausea Only    Sulfamethoxazole-trimethoprim Itching       Review of Systems:  Review of Systems - General ROS: negative for - fever  Psychological ROS: negative for - hostility  Hematological and Lymphatic ROS: negative for - bleeding problems  Endocrine ROS: negative for - unexpected weight changes  Respiratory ROS: no cough, shortness of breath, or wheezing  Cardiovascular ROS: no chest pain or dyspnea on exertion  Gastrointestinal ROS: no abdominal pain, change in bowel habits, or black or bloody stools  Musculoskeletal ROS: negative for - muscular weakness  Neurological ROS: positive for - numbness/tingling, negative for bowel/bladder changes, no acute balance changes  Dermatological ROS: negative for rash    Physical Exam:  Vitals:    04/30/19  "1526   Weight: 67.5 kg (148 lb 13 oz)   Height: 5' 2" (1.575 m)   PainSc:   3   PainLoc: Neck     Body mass index is 27.22 kg/m².     Gen: NAD  Psych: mood appropriate for given condition  CV: RRR  HEENT: anicteric   Respiratory: non labored  Abd: soft nt, nd  Skin: intact  Sensation: intact to lt touch bilaterally in c4-t1   Reflexes: 2+ b/l Bicep, tricep, and 2+ left patella and 3+ right patella, Martin negative  ROM: Cervical ROM full, shoulder, elbow and wrist ROM full, no scapular dysmotility   Tone:  Normal at elbow, wrist and shoulder   Inspection: no atrophy of bicep, FDI or APB noted, no scapular winging  Special tests: Cantu's negative bilaterally, Phalen's negative  Palpation: tender cervical paraspinals, levator scapula and trapezius non tender bicipital tendon    Motor:    Right Left   C4 Shoulder Abduction  5  5   C5 Elbow Flexion    5  5   C6 Wrist Extension  5  5   C7 Elbow Extension   5  5   C8/T1 Hand Intrinsics   5-  5-                   Imaging:  Xray cervical spine 10/23/19  FINDINGS  There are postoperative osseous and soft tissue changes present with interval anterior plate and screw fixation as well as disc space material at C5 through C7.  Osseous alignment appears maintained with no interval displaced fracture or dislocation appreciated.  There is mild chronic appearing disc space narrowing at C4-5 similarly.  No other significant interval changes are appreciated.    Cervical MRI 4/6/19  FINDINGS:  Vertebral column: Since the prior MRI, the patient has undergone ACDF of C5 through C7.  Fusion hardware results in mild susceptibility artifact.  The vertebral bodies maintain normal height.  There is marked disc space narrowing at the C4-5 level with mild-to-moderate disc space narrowing at the C3-4 level.  There is also moderate disc space narrowing at the T2-3 level.  This is unchanged.  Baseline marrow signal is normal.    Spinal canal, cord, epidural space: The spinal canal may be " borderline small on a developmental basis.  The cord is grossly normal in caliber, contour and signal intensity.  The prior study a suggesting minimal T2 hyperintense signal in the cord at the level of C6-7, likely representing changes of mild edema.  There is no definite cord volume loss/myelomalacia on the current study.  There is no abnormal epidural mass or fluid collection.    Findings by level:    C2-3: There is a minimal 2 mm central disc protrusion.  There is no spinal canal or significant foraminal stenosis.  There is no change.  C3-4: There is disc space narrowing, mild bilateral facet joint arthropathy and minimal uncovertebral spurring.  There is no spinal canal or significant foraminal stenosis.  There is no significant change.  C4-5: There is marked disc space narrowing, left greater than right facet joint arthropathy.  The left facet joints appear fused, this is unchanged and likely developmental.  There is a very shallow broad central and slightly right paracentral disc protrusion/osteophyte which may be slightly more prominent on the current study but there is no spinal canal or significant foraminal stenosis.  C5-6: Status post ACDF.  There are changes of uncovertebral spurring and right greater than left facet joint arthropathy.  There is a disc osteophyte complex which mildly narrows the ventral subarachnoid space.  There is borderline to mild spinal stenosis with mild to moderate left foraminal stenosis without significant change.  C6-7: Status post ACDF.  There is bilateral uncovertebral spurring.  There is a disc osteophyte complex.  There is narrowing of the subarachnoid space.  There is mild spinal stenosis with moderate-to-marked left foraminal stenosis without significant change.  C7-T1: There is left facet joint arthropathy.  There is disc space narrowing.  There is no spinal canal or significant foraminal stenosis and there is no change.    Redemonstrated is a shallow broad disc  protrusion at the T2-3 level but there is no significant spinal canal stenosis.  There is mild foraminal stenosis.    Soft tissues, other: The prevertebral soft tissues are normal.  There is heterogeneous signal intensity within the right lobe of the thyroid gland which could be artifactual.  However, thyroid nodule is not excluded and could be further evaluated with thyroid ultrasound.  Redemonstrated is a partially empty sella.  There is nonspecific T2 and STIR hyperintense signal in the brainstem.  This is nonspecific but may reflect sequelae of chronic small vessel disease in a patient of this age.    Labs:  BMP  Lab Results   Component Value Date     03/28/2019    K 4.4 03/28/2019     03/28/2019    CO2 25 03/28/2019    BUN 15 03/28/2019    CREATININE 0.8 03/28/2019    CALCIUM 9.3 03/28/2019    ANIONGAP 10 03/28/2019    ESTGFRAFRICA >60.0 03/28/2019    EGFRNONAA >60.0 03/28/2019     Lab Results   Component Value Date    ALT 24 10/09/2018    AST 26 10/09/2018    ALKPHOS 84 10/09/2018    BILITOT 1.0 10/09/2018       Assessment:  Problem List Items Addressed This Visit        Neuro    Bilateral carpal tunnel syndrome - Primary    Relevant Orders    Ambulatory Referral to Orthopedics          Treatment Plan:  74 y.o. year old female with PMH GERD, depression, gout presents to the office today for follow up.  She was previously seen in October 2018 by Dr. Gomez for neck pain.  She subsequently underwent C5-C7 ACDF on 1/22/18.  Prior to surgery her main complaint was numbness in both of her hands which resolved with surgery.  Today she reports to have pins and needles feeling in both of her hands.  Prior to surgery the numbness was a 'dead' feeling in her hands.  Today she describes the numbness as pins and needles.  She also feels like she has numbness that starts in her left hand and travels up her left arm stopping at the elbow.  No acute gait changes.  No bowel/bladder dysfunction.  On exam she has  5/5 strength b/l LE and UE with 5- b/l hand intrinsics, 2+ biceps, triceps, negative hoffmans.  Sensation intact to light touch C4-T1.  She has 2+ left patella and 3+ right patellar DTR.  MRI cervical spine with NFS at C5/6 and C6/7 without worsening.     She has positive b/l tinel's, weakly positive b/l phalen's.  EMG 4/10/19 consistent with moderate right and severe left CTS without evidence of cervical radiculopathy.  Given improved numbness s/p ACDF but now with different feeling of tingling in the hands and given EMG and PE findings, will give referral to orthopedic surgeon to consider diagnostic and therapeutic carpal tunnel injection.  Can consider cervical DARLIN if numbness not resolved with injection.       Procedures: none at this time  Medications: continue gabapentin and amitriptyline as prescribed  Labs: Reviewed and medications are appropriately dosed for current hepatorenal function.  Imaging: no additional at this time    : Not applicable    Brennan Sparrow M.D.  Interventional Pain Medicine / Anesthesiology

## 2019-05-08 ENCOUNTER — OFFICE VISIT (OUTPATIENT)
Dept: ORTHOPEDICS | Facility: CLINIC | Age: 74
End: 2019-05-08
Payer: MEDICARE

## 2019-05-08 ENCOUNTER — LAB VISIT (OUTPATIENT)
Dept: LAB | Facility: HOSPITAL | Age: 74
End: 2019-05-08
Attending: ORTHOPAEDIC SURGERY
Payer: MEDICARE

## 2019-05-08 VITALS
SYSTOLIC BLOOD PRESSURE: 122 MMHG | HEART RATE: 84 BPM | HEIGHT: 62 IN | DIASTOLIC BLOOD PRESSURE: 66 MMHG | WEIGHT: 148 LBS | BODY MASS INDEX: 27.23 KG/M2

## 2019-05-08 DIAGNOSIS — Z01.818 PREOP EXAMINATION: ICD-10-CM

## 2019-05-08 DIAGNOSIS — G56.03 BILATERAL CARPAL TUNNEL SYNDROME: Primary | ICD-10-CM

## 2019-05-08 DIAGNOSIS — Z01.818 PREOP EXAMINATION: Primary | ICD-10-CM

## 2019-05-08 LAB
ANION GAP SERPL CALC-SCNC: 12 MMOL/L (ref 8–16)
BASOPHILS # BLD AUTO: 0.05 K/UL (ref 0–0.2)
BASOPHILS NFR BLD: 1.2 % (ref 0–1.9)
BUN SERPL-MCNC: 21 MG/DL (ref 8–23)
CALCIUM SERPL-MCNC: 9.7 MG/DL (ref 8.7–10.5)
CHLORIDE SERPL-SCNC: 107 MMOL/L (ref 95–110)
CO2 SERPL-SCNC: 23 MMOL/L (ref 23–29)
CREAT SERPL-MCNC: 0.9 MG/DL (ref 0.5–1.4)
DIFFERENTIAL METHOD: ABNORMAL
EOSINOPHIL # BLD AUTO: 0.2 K/UL (ref 0–0.5)
EOSINOPHIL NFR BLD: 3.9 % (ref 0–8)
ERYTHROCYTE [DISTWIDTH] IN BLOOD BY AUTOMATED COUNT: 14.1 % (ref 11.5–14.5)
EST. GFR  (AFRICAN AMERICAN): >60 ML/MIN/1.73 M^2
EST. GFR  (NON AFRICAN AMERICAN): >60 ML/MIN/1.73 M^2
GLUCOSE SERPL-MCNC: 90 MG/DL (ref 70–110)
HCT VFR BLD AUTO: 37.9 % (ref 37–48.5)
HGB BLD-MCNC: 12.8 G/DL (ref 12–16)
IMM GRANULOCYTES # BLD AUTO: 0.01 K/UL (ref 0–0.04)
IMM GRANULOCYTES NFR BLD AUTO: 0.2 % (ref 0–0.5)
LYMPHOCYTES # BLD AUTO: 1 K/UL (ref 1–4.8)
LYMPHOCYTES NFR BLD: 23.3 % (ref 18–48)
MCH RBC QN AUTO: 30.8 PG (ref 27–31)
MCHC RBC AUTO-ENTMCNC: 33.8 G/DL (ref 32–36)
MCV RBC AUTO: 91 FL (ref 82–98)
MONOCYTES # BLD AUTO: 0.4 K/UL (ref 0.3–1)
MONOCYTES NFR BLD: 9 % (ref 4–15)
NEUTROPHILS # BLD AUTO: 2.7 K/UL (ref 1.8–7.7)
NEUTROPHILS NFR BLD: 62.4 % (ref 38–73)
NRBC BLD-RTO: 0 /100 WBC
PLATELET # BLD AUTO: 167 K/UL (ref 150–350)
PMV BLD AUTO: 9.1 FL (ref 9.2–12.9)
POTASSIUM SERPL-SCNC: 4.1 MMOL/L (ref 3.5–5.1)
RBC # BLD AUTO: 4.15 M/UL (ref 4–5.4)
SODIUM SERPL-SCNC: 142 MMOL/L (ref 136–145)
WBC # BLD AUTO: 4.34 K/UL (ref 3.9–12.7)

## 2019-05-08 PROCEDURE — 1101F PR PT FALLS ASSESS DOC 0-1 FALLS W/OUT INJ PAST YR: ICD-10-PCS | Mod: CPTII,S$GLB,, | Performed by: ORTHOPAEDIC SURGERY

## 2019-05-08 PROCEDURE — 99999 PR PBB SHADOW E&M-EST. PATIENT-LVL III: ICD-10-PCS | Mod: PBBFAC,,, | Performed by: ORTHOPAEDIC SURGERY

## 2019-05-08 PROCEDURE — 3078F DIAST BP <80 MM HG: CPT | Mod: CPTII,S$GLB,, | Performed by: ORTHOPAEDIC SURGERY

## 2019-05-08 PROCEDURE — 99214 PR OFFICE/OUTPT VISIT, EST, LEVL IV, 30-39 MIN: ICD-10-PCS | Mod: 57,S$GLB,, | Performed by: ORTHOPAEDIC SURGERY

## 2019-05-08 PROCEDURE — 99499 UNLISTED E&M SERVICE: CPT | Mod: S$GLB,,, | Performed by: ORTHOPAEDIC SURGERY

## 2019-05-08 PROCEDURE — 3074F PR MOST RECENT SYSTOLIC BLOOD PRESSURE < 130 MM HG: ICD-10-PCS | Mod: CPTII,S$GLB,, | Performed by: ORTHOPAEDIC SURGERY

## 2019-05-08 PROCEDURE — 80048 BASIC METABOLIC PNL TOTAL CA: CPT

## 2019-05-08 PROCEDURE — 99999 PR PBB SHADOW E&M-EST. PATIENT-LVL III: CPT | Mod: PBBFAC,,, | Performed by: ORTHOPAEDIC SURGERY

## 2019-05-08 PROCEDURE — 85025 COMPLETE CBC W/AUTO DIFF WBC: CPT

## 2019-05-08 PROCEDURE — 99214 OFFICE O/P EST MOD 30 MIN: CPT | Mod: 57,S$GLB,, | Performed by: ORTHOPAEDIC SURGERY

## 2019-05-08 PROCEDURE — 3078F PR MOST RECENT DIASTOLIC BLOOD PRESSURE < 80 MM HG: ICD-10-PCS | Mod: CPTII,S$GLB,, | Performed by: ORTHOPAEDIC SURGERY

## 2019-05-08 PROCEDURE — 99499 RISK ADDL DX/OHS AUDIT: ICD-10-PCS | Mod: S$GLB,,, | Performed by: ORTHOPAEDIC SURGERY

## 2019-05-08 PROCEDURE — 1101F PT FALLS ASSESS-DOCD LE1/YR: CPT | Mod: CPTII,S$GLB,, | Performed by: ORTHOPAEDIC SURGERY

## 2019-05-08 PROCEDURE — 36415 COLL VENOUS BLD VENIPUNCTURE: CPT | Mod: PO

## 2019-05-08 PROCEDURE — 3074F SYST BP LT 130 MM HG: CPT | Mod: CPTII,S$GLB,, | Performed by: ORTHOPAEDIC SURGERY

## 2019-05-08 NOTE — LETTER
May 9, 2019      Brennan Sparrow MD  [address] [facility] [address] [place] [address]           [facility]  [address] [facility] [address] [place] [address]  Phone: [phone]          Patient: Zara Ann   MR Number: [id number]   YOB: 1945   Date of Visit: 5/8/2019       Dear Dr. Brennan Sparrow:    Thank you for referring Zara Ann to me for evaluation. Attached you will find relevant portions of my assessment and plan of care.    If you have questions, please do not hesitate to call me. I look forward to following Zara Ann along with you.    Sincerely,    Daniel Goode MD    Enclosure  CC:  No Recipients    If you would like to receive this communication electronically, please contact [email or web address] or [phone] to request more information on [employer] access.    For providers and/or their staff who would like to refer a patient to [facility], please contact us through our one-stop-shop provider referral line, [facility], at [phone].    If you feel you have received this communication in error or would no longer like to receive these types of communications, please e-mail [email or web address]

## 2019-05-09 NOTE — H&P (VIEW-ONLY)
Past Medical History:   Diagnosis Date    Cancer     left arm skin    Depression     Diverticulosis     Fatty liver     Genital herpes     GERD (gastroesophageal reflux disease)     Gout     History of use of hearing aid in left ear     Osteopenia     RAD (reactive airway disease)     Rheumatic fever     UC (ulcerative colitis)        Past Surgical History:   Procedure Laterality Date    APPENDECTOMY      CHOLECYSTECTOMY      COLONOSCOPY  2013    every 2 yrs    DECOMPRESSION AND FUSION, SPINE, CERVICAL, ANTERIOR APPROACH  C5-C7 ANTERIOR CERVICAL DECOMPRESSION AND FUSION N/A 10/22/2018    Performed by Demetri Caro MD at [facility] OR    HYSTERECTOMY      parital - benign - fibroids    Rectocele  1983    with hysterectomy       Current Outpatient Medications   Medication Sig    acyclovir 5% (ZOVIRAX) 5 % ointment Apply topically 5 (five) times daily. (Patient taking differently: Apply topically 5 (five) times daily. )    amitriptyline (ELAVIL) 25 MG tablet Take 1 tablet (25 mg total) by mouth nightly as needed for Insomnia.    ascorbic acid (VITAMIN C) 500 MG tablet Take 500 mg by mouth once daily.    aspirin (ECOTRIN) 81 MG EC tablet Take 1 tablet (81 mg total) by mouth every evening.    calcium-vitamin D3 500 mg(1,250mg) -200 unit per tablet Take 1 tablet by mouth once daily.     cyanocobalamin, vitamin B-12, (VITAMIN B-12) 50 mcg tablet Take 50 mcg by mouth once daily.    fish oil-omega-3 fatty acids 300-1,000 mg capsule Take by mouth every evening.     FLAXSEED OIL ORAL Take by mouth once daily.    gabapentin (NEURONTIN) 300 MG capsule Take 1 capsule (300 mg total) by mouth nightly as needed.    mesalamine (ASACOL) 800 mg TbEC Take 800 mg by mouth 2 (two) times daily.     milk thistle 175 mg tablet Take 175 mg by mouth 2 (two) times daily.     multivitamin capsule Take 1 capsule by mouth once daily.    sertraline (ZOLOFT) 100 MG tablet TAKE 1 TABLET BY MOUTH TWICE DAILY    TURMERIC  ROOT EXTRACT ORAL Take by mouth every evening.     valACYclovir (VALTREX) 1000 MG tablet Take 1 tablet (1,000 mg total) by mouth daily as needed (outbreak).     No current facility-administered medications for this visit.        Review of patient's allergies indicates:   Allergen Reactions    Ciprofloxacin Shortness Of Breath    Clindamycin Shortness Of Breath    Nitrofurantoin monohyd/m-cryst Shortness Of Breath, Anaphylaxis and Itching    Penicillins Hives    Sulfa (sulfonamide antibiotics) Hives    Cephalosporins Hives    Hydrocod-cpm-pe-acetaminophen     Hydrocodone Itching    Hydrocortisone Nausea Only    Sulfamethoxazole-trimethoprim Itching       Family History   Problem Relation Age of Onset    Cancer Mother         colon    Cancer Father         kidney     Ulcers Sister     Pancreatitis Sister     Breast cancer Sister     Liver cancer Paternal Aunt     Allergic rhinitis Neg Hx     Allergies Neg Hx     Angioedema Neg Hx     Asthma Neg Hx     Atopy Neg Hx     Eczema Neg Hx     Rhinitis Neg Hx     Urticaria Neg Hx     Immunodeficiency Neg Hx        Social History     Socioeconomic History    Marital status:      Spouse name: Not on file    Number of children: Not on file    Years of education: Not on file    Highest education level: Not on file   Occupational History    Not on file   Social Needs    Financial resource strain: Not on file    Food insecurity:     Worry: Not on file     Inability: Not on file    Transportation needs:     Medical: Not on file     Non-medical: Not on file   Tobacco Use    Smoking status: Never Smoker    Smokeless tobacco: Never Used   Substance and Sexual Activity    Alcohol use: Yes     Comment: rarely     Drug use: No    Sexual activity: Not on file   Lifestyle    Physical activity:     Days per week: Not on file     Minutes per session: Not on file    Stress: Not on file   Relationships    Social connections:     Talks on phone:  Not on file     Gets together: Not on file     Attends Sabianist service: Not on file     Active member of club or organization: Not on file     Attends meetings of clubs or organizations: Not on file     Relationship status: Not on file   Other Topics Concern    Not on file   Social History Narrative    Not on file       Chief Complaint:   Chief Complaint   Patient presents with    Hand Pain     bilateral        History of present illness:  74-year-old female seen for bilateral hand pain and numbness.  Patient started having symptoms since early March.  Patient saw a nurse practitioner gave her a prednisone pack.  It helped tremendously.  Still has numbness and tingling in the median nerve distribution of both hands.  The left arm is the worst.  She had an EMG done which showed moderate findings on the right and severe findings on the left.  Pain currently is a 3/10.  She has tried Neurontin without success.  History of cervical spine surgery as well.      Answers for HPI/ROS submitted by the patient on 5/7/2019   Hand injury  unexpected weight change: No  appetite change : No  sleep disturbance: No  IMMUNOCOMPROMISED: No  nervous/ anxious: No  dysphoric mood: No  rash: No  visual disturbance: No  eye redness: No  eye pain: No  ear pain: No  tinnitus: No  hearing loss: Yes  sinus pressure : No  nosebleeds: No  enviro allergies: Yes  food allergies: No  cough: No  shortness of breath: No  sweating: No  dysuria: No  frequency: No  difficulty urinating: No  hematuria: No  painful intercourse: No  chest pain: No  palpitations: No  nausea: No  vomiting: No  diarrhea: No  blood in stool: No  constipation: No  headaches: No  dizziness: No  numbness: Yes  seizures: No  joint swelling: Yes  weakness: No  back pain: Yes  Pain Chronicity: chronic  History of trauma: No  Onset: more than 1 month ago  Frequency: constantly  Progression since onset: gradually improving  injury location: at home  pain- numeric: 2/10  pain  location: left hand, right hand  pain quality: tightness, burning, numbing, tingling  Radiating Pain: Yes  If your pain is radiating, to what part of the body?: left forearm  Aggravating factors: touching  fever: No  inability to bear weight: No  itching: No  joint locking: Yes  limited range of motion: No  stiffness: No  tingling: Yes  Treatments tried: acupuncture, cold, heat, exercise, NSAIDs, OTC ointments, OTC pain meds  physical therapy: not tried  Improvement on treatment: no relief      Physical Examination:    Vital Signs:    Vitals:    05/08/19 1544   BP: 122/66   Pulse: 84       Body mass index is 27.07 kg/m².    This a well-developed, well nourished patient in no acute distress.  They are alert and oriented and cooperative to examination.  Pt. walks without an antalgic gait.      Examination of bilateral hands and wrist shows no signs of rashes or erythema. Patient has no masses ecchymosis or effusions. Patient has full range of motion of the wrist in flexion and extension as well as ulnar and radial deviation. The patient also has full range of motion of all joints in the hand. There are 2+ radial pulse and intact light touch sensation in all 5 digits. Nontender over the anatomic snuffbox.  Positive median Tinel's. Negative Finkelstein's test.     Heart is regular rate without obvious murmurs   Normal respiratory effort without audible wheezing  Abdomen is soft and nontender       X-rays:  None     Assessment::  Left carpal tunnel syndrome    Plan:  I reviewed the findings with her today.  We talked about treatment options for carpal tunnel.  Patient has had pretty significant findings now is starting to drop things.  We talked about conservative measures such as carpal tunnel injections and braces versus surgical treatment options. She would like to proceed with left carpal tunnel release.  Risks, benefits, and alternatives to the procedure were explained to the patient including but not limited to  damage to nerves, arteries, blood vessels, bones, tendons, ligaments, stiffness, instability, infection, DVT, PE, as well as general anesthetic complications including seizure, stroke, heart attack and even death. The patient understood these risks and wished to proceed and signed the informed consent.       This note was created using M Modal voice recognition software that occasionally misinterpreted phrases or words.    Consult note is delivered via Epic messaging service.

## 2019-05-09 NOTE — PROGRESS NOTES
Past Medical History:   Diagnosis Date    Cancer     left arm skin    Depression     Diverticulosis     Fatty liver     Genital herpes     GERD (gastroesophageal reflux disease)     Gout     History of use of hearing aid in left ear     Osteopenia     RAD (reactive airway disease)     Rheumatic fever     UC (ulcerative colitis)        Past Surgical History:   Procedure Laterality Date    APPENDECTOMY      CHOLECYSTECTOMY      COLONOSCOPY  2013    every 2 yrs    DECOMPRESSION AND FUSION, SPINE, CERVICAL, ANTERIOR APPROACH  C5-C7 ANTERIOR CERVICAL DECOMPRESSION AND FUSION N/A 10/22/2018    Performed by [facility] at [facility] OR    HYSTERECTOMY      parital - benign - fibroids    Rectocele  1983    with hysterectomy       Current Outpatient Medications   Medication Sig    acyclovir 5% (ZOVIRAX) 5 % ointment Apply topically 5 (five) times daily. (Patient taking differently: Apply topically 5 (five) times daily. )    amitriptyline (ELAVIL) 25 MG tablet Take 1 tablet (25 mg total) by mouth nightly as needed for Insomnia.    ascorbic acid (VITAMIN C) 500 MG tablet Take 500 mg by mouth once daily.    aspirin (ECOTRIN) 81 MG EC tablet Take 1 tablet (81 mg total) by mouth every evening.    calcium-vitamin D3 500 mg(1,250mg) -200 unit per tablet Take 1 tablet by mouth once daily.     cyanocobalamin, vitamin B-12, (VITAMIN B-12) 50 mcg tablet Take 50 mcg by mouth once daily.    fish oil-omega-3 fatty acids 300-1,000 mg capsule Take by mouth every evening.     FLAXSEED OIL ORAL Take by mouth once daily.    gabapentin (NEURONTIN) 300 MG capsule Take 1 capsule (300 mg total) by mouth nightly as needed.    mesalamine (ASACOL) 800 mg TbEC Take 800 mg by mouth 2 (two) times daily.     milk thistle 175 mg tablet Take 175 mg by mouth 2 (two) times daily.     multivitamin capsule Take 1 capsule by mouth once daily.    sertraline (ZOLOFT) 100 MG tablet TAKE 1 TABLET BY MOUTH TWICE DAILY    TURMERIC  ROOT EXTRACT ORAL Take by mouth every evening.     valACYclovir (VALTREX) 1000 MG tablet Take 1 tablet (1,000 mg total) by mouth daily as needed (outbreak).     No current facility-administered medications for this visit.        Review of patient's allergies indicates:   Allergen Reactions    Ciprofloxacin Shortness Of Breath    Clindamycin Shortness Of Breath    Nitrofurantoin monohyd/m-cryst Shortness Of Breath, Anaphylaxis and Itching    Penicillins Hives    Sulfa (sulfonamide antibiotics) Hives    Cephalosporins Hives    Hydrocod-cpm-pe-acetaminophen     Hydrocodone Itching    Hydrocortisone Nausea Only    Sulfamethoxazole-trimethoprim Itching       Family History   Problem Relation Age of Onset    Cancer Mother         colon    Cancer Father         kidney     Ulcers Sister     Pancreatitis Sister     Breast cancer Sister     Liver cancer Paternal Aunt     Allergic rhinitis Neg Hx     Allergies Neg Hx     Angioedema Neg Hx     Asthma Neg Hx     Atopy Neg Hx     Eczema Neg Hx     Rhinitis Neg Hx     Urticaria Neg Hx     Immunodeficiency Neg Hx        Social History     Socioeconomic History    Marital status:      Spouse name: Not on file    Number of children: Not on file    Years of education: Not on file    Highest education level: Not on file   Occupational History    Not on file   Social Needs    Financial resource strain: Not on file    Food insecurity:     Worry: Not on file     Inability: Not on file    Transportation needs:     Medical: Not on file     Non-medical: Not on file   Tobacco Use    Smoking status: Never Smoker    Smokeless tobacco: Never Used   Substance and Sexual Activity    Alcohol use: Yes     Comment: rarely     Drug use: No    Sexual activity: Not on file   Lifestyle    Physical activity:     Days per week: Not on file     Minutes per session: Not on file    Stress: Not on file   Relationships    Social connections:     Talks on phone:  Not on file     Gets together: Not on file     Attends Sabianism service: Not on file     Active member of club or organization: Not on file     Attends meetings of clubs or organizations: Not on file     Relationship status: Not on file   Other Topics Concern    Not on file   Social History Narrative    Not on file       Chief Complaint:   Chief Complaint   Patient presents with    Hand Pain     bilateral        History of present illness:  74-year-old female seen for bilateral hand pain and numbness.  Patient started having symptoms since early March.  Patient saw a nurse practitioner gave her a prednisone pack.  It helped tremendously.  Still has numbness and tingling in the median nerve distribution of both hands.  The left arm is the worst.  She had an EMG done which showed moderate findings on the right and severe findings on the left.  Pain currently is a 3/10.  She has tried Neurontin without success.  History of cervical spine surgery as well.      Answers for HPI/ROS submitted by the patient on 5/7/2019   Hand injury  unexpected weight change: No  appetite change : No  sleep disturbance: No  IMMUNOCOMPROMISED: No  nervous/ anxious: No  dysphoric mood: No  rash: No  visual disturbance: No  eye redness: No  eye pain: No  ear pain: No  tinnitus: No  hearing loss: Yes  sinus pressure : No  nosebleeds: No  enviro allergies: Yes  food allergies: No  cough: No  shortness of breath: No  sweating: No  dysuria: No  frequency: No  difficulty urinating: No  hematuria: No  painful intercourse: No  chest pain: No  palpitations: No  nausea: No  vomiting: No  diarrhea: No  blood in stool: No  constipation: No  headaches: No  dizziness: No  numbness: Yes  seizures: No  joint swelling: Yes  weakness: No  back pain: Yes  Pain Chronicity: chronic  History of trauma: No  Onset: more than 1 month ago  Frequency: constantly  Progression since onset: gradually improving  injury location: at home  pain- numeric: 2/10  pain  location: left hand, right hand  pain quality: tightness, burning, numbing, tingling  Radiating Pain: Yes  If your pain is radiating, to what part of the body?: left forearm  Aggravating factors: touching  fever: No  inability to bear weight: No  itching: No  joint locking: Yes  limited range of motion: No  stiffness: No  tingling: Yes  Treatments tried: acupuncture, cold, heat, exercise, NSAIDs, OTC ointments, OTC pain meds  physical therapy: not tried  Improvement on treatment: no relief      Physical Examination:    Vital Signs:    Vitals:    05/08/19 1544   BP: 122/66   Pulse: 84       Body mass index is 27.07 kg/m².    This a well-developed, well nourished patient in no acute distress.  They are alert and oriented and cooperative to examination.  Pt. walks without an antalgic gait.      Examination of bilateral hands and wrist shows no signs of rashes or erythema. Patient has no masses ecchymosis or effusions. Patient has full range of motion of the wrist in flexion and extension as well as ulnar and radial deviation. The patient also has full range of motion of all joints in the hand. There are 2+ radial pulse and intact light touch sensation in all 5 digits. Nontender over the anatomic snuffbox.  Positive median Tinel's. Negative Finkelstein's test.     Heart is regular rate without obvious murmurs   Normal respiratory effort without audible wheezing  Abdomen is soft and nontender       X-rays:  None     Assessment::  Left carpal tunnel syndrome    Plan:  I reviewed the findings with her today.  We talked about treatment options for carpal tunnel.  Patient has had pretty significant findings now is starting to drop things.  We talked about conservative measures such as carpal tunnel injections and braces versus surgical treatment options. She would like to proceed with left carpal tunnel release.  Risks, benefits, and alternatives to the procedure were explained to the patient including but not limited to  damage to nerves, arteries, blood vessels, bones, tendons, ligaments, stiffness, instability, infection, DVT, PE, as well as general anesthetic complications including seizure, stroke, heart attack and even death. The patient understood these risks and wished to proceed and signed the informed consent.       This note was created using M Modal voice recognition software that occasionally misinterpreted phrases or words.    Consult note is delivered via Epic messaging service.

## 2019-05-10 RX ORDER — MUPIROCIN 20 MG/G
OINTMENT TOPICAL
Status: CANCELLED | OUTPATIENT
Start: 2019-05-10

## 2019-05-13 ENCOUNTER — OFFICE VISIT (OUTPATIENT)
Dept: ALLERGY | Facility: CLINIC | Age: 74
End: 2019-05-13
Payer: MEDICARE

## 2019-05-13 VITALS — BODY MASS INDEX: 27.47 KG/M2 | WEIGHT: 149.25 LBS | HEART RATE: 90 BPM | HEIGHT: 62 IN | OXYGEN SATURATION: 95 %

## 2019-05-13 DIAGNOSIS — Z88.9 DRUG ALLERGY: ICD-10-CM

## 2019-05-13 DIAGNOSIS — Z88.0 PENICILLIN ALLERGY: Primary | ICD-10-CM

## 2019-05-13 PROCEDURE — 99213 PR OFFICE/OUTPT VISIT, EST, LEVL III, 20-29 MIN: ICD-10-PCS | Mod: 25,S$GLB,, | Performed by: ALLERGY & IMMUNOLOGY

## 2019-05-13 PROCEDURE — 95018 PR PERQ&IC ALLG TEST DRUGS/BIOL: ICD-10-PCS | Mod: S$GLB,,, | Performed by: ALLERGY & IMMUNOLOGY

## 2019-05-13 PROCEDURE — 99999 PR PBB SHADOW E&M-EST. PATIENT-LVL III: ICD-10-PCS | Mod: PBBFAC,,, | Performed by: ALLERGY & IMMUNOLOGY

## 2019-05-13 PROCEDURE — 1101F PR PT FALLS ASSESS DOC 0-1 FALLS W/OUT INJ PAST YR: ICD-10-PCS | Mod: CPTII,S$GLB,, | Performed by: ALLERGY & IMMUNOLOGY

## 2019-05-13 PROCEDURE — 1101F PT FALLS ASSESS-DOCD LE1/YR: CPT | Mod: CPTII,S$GLB,, | Performed by: ALLERGY & IMMUNOLOGY

## 2019-05-13 PROCEDURE — 99213 OFFICE O/P EST LOW 20 MIN: CPT | Mod: 25,S$GLB,, | Performed by: ALLERGY & IMMUNOLOGY

## 2019-05-13 PROCEDURE — 95018 ALL TSTG PERQ&IQ DRUGS/BIOL: CPT | Mod: S$GLB,,, | Performed by: ALLERGY & IMMUNOLOGY

## 2019-05-13 PROCEDURE — 95076 PR INGESTION CHALLENGE TEST; INITIAL 120 MIN: ICD-10-PCS | Mod: S$GLB,,, | Performed by: ALLERGY & IMMUNOLOGY

## 2019-05-13 PROCEDURE — 95076 INGEST CHALLENGE INI 120 MIN: CPT | Mod: S$GLB,,, | Performed by: ALLERGY & IMMUNOLOGY

## 2019-05-13 PROCEDURE — 99999 PR PBB SHADOW E&M-EST. PATIENT-LVL III: CPT | Mod: PBBFAC,,, | Performed by: ALLERGY & IMMUNOLOGY

## 2019-05-13 NOTE — PROGRESS NOTES
Subjective:       Patient ID: Zara Ann is a 74 y.o. female.    Chief Complaint:  Other (pcn challenge)      75 yo woman presents for continued evaluaiton of PCN allergy and other drug allergies. She states she reacts to many meds, pain meds cause nausea, severe constipation and sedation. She tries to avoid. She also has had reaction to several antibiotics. At 11 she had rheumatic fever. She was on PCN for a year and then sulfa for a year. She was fine. As young adult had PCN again and had itching and hives. She does not thinks blisters or SOB. She cannot remember having reaction to cephalosporins, thinks she has taken keflex without issue but has cephalosporins listed as an allergy. She was prone to UTI and last year was on Cipro which she took many times and H ad significant chest pressure and SOB so avoid that now. Macrobid also caused chest pressure and SOB. Clindamycin, not sure reaction. She was advised needs to know if allergic to these. She has no food or insect allergy. No rhinitis or asthma or eczema      Environmental History: see history section for home environment  Review of Systems   Constitutional: Negative for appetite change, chills, fatigue and fever.   HENT: Negative for congestion, ear discharge, ear pain, facial swelling, nosebleeds, postnasal drip, rhinorrhea, sinus pressure, sneezing, sore throat, trouble swallowing and voice change.    Eyes: Negative for discharge, redness, itching and visual disturbance.   Respiratory: Negative for cough, choking, chest tightness, shortness of breath and wheezing.    Cardiovascular: Negative for chest pain, palpitations and leg swelling.   Gastrointestinal: Negative for abdominal distention, abdominal pain, constipation, diarrhea, nausea and vomiting.   Genitourinary: Negative for difficulty urinating.   Musculoskeletal: Positive for arthralgias. Negative for gait problem, joint swelling and myalgias.   Skin: Positive for color change and rash.    Neurological: Positive for numbness. Negative for dizziness, syncope, weakness, light-headedness and headaches.   Hematological: Negative for adenopathy. Does not bruise/bleed easily.   Psychiatric/Behavioral: Negative for agitation, behavioral problems, confusion and sleep disturbance. The patient is not nervous/anxious.         Objective:      Physical Exam   Constitutional: She is oriented to person, place, and time. She appears well-developed and well-nourished. No distress.   HENT:   Head: Normocephalic and atraumatic.   Right Ear: Hearing, tympanic membrane, external ear and ear canal normal.   Left Ear: Hearing, tympanic membrane, external ear and ear canal normal.   Nose: No mucosal edema, rhinorrhea, sinus tenderness or septal deviation. No epistaxis. Right sinus exhibits no maxillary sinus tenderness and no frontal sinus tenderness. Left sinus exhibits no maxillary sinus tenderness and no frontal sinus tenderness.   Mouth/Throat: Uvula is midline, oropharynx is clear and moist and mucous membranes are normal. No uvula swelling.   Eyes: Conjunctivae are normal. Right eye exhibits no discharge. Left eye exhibits no discharge.   Neck: Normal range of motion. No thyromegaly present.   Cardiovascular: Normal rate, regular rhythm and normal heart sounds.   No murmur heard.  Pulmonary/Chest: Effort normal and breath sounds normal. No respiratory distress. She has no wheezes.   Abdominal: Soft. She exhibits no distension. There is no tenderness.   Musculoskeletal: Normal range of motion. She exhibits no edema or tenderness.   Lymphadenopathy:     She has no cervical adenopathy.   Neurological: She is alert and oriented to person, place, and time.   Skin: Skin is warm and dry. No rash noted. No erythema.   Psychiatric: She has a normal mood and affect. Her behavior is normal. Judgment and thought content normal.   Nursing note and vitals reviewed.      Laboratory:   none performed      Skin testing with  penicillin: This type of testing assesses the risk for an IgE-mediated, immediate-type hypersensitivity reaction following administration of penicillin. The spectrum of symptoms that this test will assess the risk for includes: urticaria, angioedema, laryngeal edema, bronchospasm and shock. This test does not assess the risk for symptoms produced by non-immunologic mechanisms or other immunologic mechanisms including serum sickness, interstitial nephritis, immunologic cytopenias, Peralta-Kev syndrome, erythema multiforme, toxic epidermal necrolysis, etc.    Prick test at 1326 with 3+ histamine and negative prepen, penG  and saline   Intradermals done at 1345 with 3+ histamine and negative prepen, penG and saline    Amoxicillin challenge  Total time: 70 minutes  The risks and benefits of an ingestion challenge with amoxicillin was reviewed with the patient. After verbal consent was obtained, 250mg of amoxicillin PO was administered. The patient was observed for an hour with no change in physical exam.    Results:  No immediate type hypersensitivity to amoxicillin  Interpretation: The patient has no evidence of immediate-type hypersensitivity to amoxicillin, and should be able to tolerate penicillin-class antibiotics without an increased risk of anaphylactic reaction. The patient was counseled that delayed-type allergy may take more than 24 hours to develop. While these reactions are not life-threatening, please call the on-call allergy fellow in the event that any symptoms develop.   Assessment:       1. Penicillin allergy    2. Drug allergy         Plan:       1. the patient has no evidence of immediate-type hypersensitivity to amoxicillin, and should be able to tolerate penicillin-class antibiotics without an increased risk of anaphylactic reaction. The patient was counseled that delayed-type allergy may take more than 24 hours to develop. While these reactions are not life-threatening, please call the on-call  allergy fellow in the event that any symptoms develop. Note: this does not determine the patient's risk for future development of penicillin-specific IgE.

## 2019-05-23 ENCOUNTER — ANESTHESIA EVENT (OUTPATIENT)
Dept: SURGERY | Facility: HOSPITAL | Age: 74
End: 2019-05-23
Payer: MEDICARE

## 2019-05-24 ENCOUNTER — ANESTHESIA (OUTPATIENT)
Dept: SURGERY | Facility: HOSPITAL | Age: 74
End: 2019-05-24
Payer: MEDICARE

## 2019-05-24 ENCOUNTER — HOSPITAL ENCOUNTER (OUTPATIENT)
Facility: HOSPITAL | Age: 74
Discharge: HOME | End: 2019-05-24
Attending: ORTHOPAEDIC SURGERY | Admitting: ORTHOPAEDIC SURGERY
Payer: MEDICARE

## 2019-05-24 DIAGNOSIS — G56.03 BILATERAL CARPAL TUNNEL SYNDROME: ICD-10-CM

## 2019-05-24 PROCEDURE — 99900103 DSU ONLY-NO CHARGE-INITIAL HR (STAT): Performed by: ORTHOPAEDIC SURGERY

## 2019-05-24 PROCEDURE — 63600175 PHARM REV CODE 636 W HCPCS: Performed by: ORTHOPAEDIC SURGERY

## 2019-05-24 PROCEDURE — 64721 PR REVISE MEDIAN N/CARPAL TUNNEL SURG: ICD-10-PCS | Mod: RT,,, | Performed by: ORTHOPAEDIC SURGERY

## 2019-05-24 PROCEDURE — 37000008 HC ANESTHESIA 1ST 15 MINUTES: Performed by: ORTHOPAEDIC SURGERY

## 2019-05-24 PROCEDURE — 71000033 HC RECOVERY, INTIAL HOUR: Performed by: ORTHOPAEDIC SURGERY

## 2019-05-24 PROCEDURE — 25000003 PHARM REV CODE 250: Performed by: ORTHOPAEDIC SURGERY

## 2019-05-24 PROCEDURE — S0020 INJECTION, BUPIVICAINE HYDRO: HCPCS | Performed by: ORTHOPAEDIC SURGERY

## 2019-05-24 PROCEDURE — D9220A PRA ANESTHESIA: ICD-10-PCS | Mod: CRNA,,, | Performed by: NURSE ANESTHETIST, CERTIFIED REGISTERED

## 2019-05-24 PROCEDURE — 71000039 HC RECOVERY, EACH ADD'L HOUR: Performed by: ORTHOPAEDIC SURGERY

## 2019-05-24 PROCEDURE — 37000009 HC ANESTHESIA EA ADD 15 MINS: Performed by: ORTHOPAEDIC SURGERY

## 2019-05-24 PROCEDURE — D9220A PRA ANESTHESIA: Mod: ANES,,, | Performed by: ANESTHESIOLOGY

## 2019-05-24 PROCEDURE — D9220A PRA ANESTHESIA: ICD-10-PCS | Mod: ANES,,, | Performed by: ANESTHESIOLOGY

## 2019-05-24 PROCEDURE — 36000707: Performed by: ORTHOPAEDIC SURGERY

## 2019-05-24 PROCEDURE — 99900104 DSU ONLY-NO CHARGE-EA ADD'L HR (STAT): Performed by: ORTHOPAEDIC SURGERY

## 2019-05-24 PROCEDURE — 64721 CARPAL TUNNEL SURGERY: CPT | Mod: RT,,, | Performed by: ORTHOPAEDIC SURGERY

## 2019-05-24 PROCEDURE — D9220A PRA ANESTHESIA: Mod: CRNA,,, | Performed by: NURSE ANESTHETIST, CERTIFIED REGISTERED

## 2019-05-24 PROCEDURE — 25000003 PHARM REV CODE 250: Performed by: ANESTHESIOLOGY

## 2019-05-24 PROCEDURE — 36000706: Performed by: ORTHOPAEDIC SURGERY

## 2019-05-24 PROCEDURE — 71000015 HC POSTOP RECOV 1ST HR: Performed by: ORTHOPAEDIC SURGERY

## 2019-05-24 PROCEDURE — 63600175 PHARM REV CODE 636 W HCPCS: Performed by: NURSE ANESTHETIST, CERTIFIED REGISTERED

## 2019-05-24 RX ORDER — ONDANSETRON HYDROCHLORIDE 2 MG/ML
INJECTION, SOLUTION INTRAMUSCULAR; INTRAVENOUS
Status: DISCONTINUED | OUTPATIENT
Start: 2019-05-24 | End: 2019-05-24

## 2019-05-24 RX ORDER — VANCOMYCIN HCL IN 5 % DEXTROSE 1G/250ML
15 PLASTIC BAG, INJECTION (ML) INTRAVENOUS
Status: COMPLETED | OUTPATIENT
Start: 2019-05-24 | End: 2019-05-24

## 2019-05-24 RX ORDER — DEXAMETHASONE SODIUM PHOSPHATE 4 MG/ML
INJECTION, SOLUTION INTRA-ARTICULAR; INTRALESIONAL; INTRAMUSCULAR; INTRAVENOUS; SOFT TISSUE
Status: DISCONTINUED | OUTPATIENT
Start: 2019-05-24 | End: 2019-05-24

## 2019-05-24 RX ORDER — LIDOCAINE HCL/PF 100 MG/5ML
SYRINGE (ML) INTRAVENOUS
Status: DISCONTINUED | OUTPATIENT
Start: 2019-05-24 | End: 2019-05-24

## 2019-05-24 RX ORDER — MUPIROCIN 20 MG/G
OINTMENT TOPICAL
Status: DISCONTINUED | OUTPATIENT
Start: 2019-05-24 | End: 2019-05-24 | Stop reason: HOSPADM

## 2019-05-24 RX ORDER — FENTANYL CITRATE 50 UG/ML
25 INJECTION, SOLUTION INTRAMUSCULAR; INTRAVENOUS EVERY 5 MIN PRN
Status: DISCONTINUED | OUTPATIENT
Start: 2019-05-24 | End: 2019-05-24 | Stop reason: HOSPADM

## 2019-05-24 RX ORDER — SODIUM CHLORIDE, SODIUM LACTATE, POTASSIUM CHLORIDE, CALCIUM CHLORIDE 600; 310; 30; 20 MG/100ML; MG/100ML; MG/100ML; MG/100ML
10 INJECTION, SOLUTION INTRAVENOUS CONTINUOUS
Status: DISCONTINUED | OUTPATIENT
Start: 2019-05-24 | End: 2023-04-08

## 2019-05-24 RX ORDER — METOCLOPRAMIDE HYDROCHLORIDE 5 MG/ML
10 INJECTION INTRAMUSCULAR; INTRAVENOUS EVERY 10 MIN PRN
Status: DISCONTINUED | OUTPATIENT
Start: 2019-05-24 | End: 2019-05-24 | Stop reason: HOSPADM

## 2019-05-24 RX ORDER — LIDOCAINE HYDROCHLORIDE 10 MG/ML
1 INJECTION, SOLUTION EPIDURAL; INFILTRATION; INTRACAUDAL; PERINEURAL ONCE
Status: ACTIVE | OUTPATIENT
Start: 2019-05-24

## 2019-05-24 RX ORDER — SODIUM CHLORIDE 0.9 % (FLUSH) 0.9 %
3 SYRINGE (ML) INJECTION EVERY 8 HOURS
Status: DISCONTINUED | OUTPATIENT
Start: 2019-05-24 | End: 2023-04-08

## 2019-05-24 RX ORDER — FENTANYL CITRATE 50 UG/ML
INJECTION, SOLUTION INTRAMUSCULAR; INTRAVENOUS
Status: DISCONTINUED | OUTPATIENT
Start: 2019-05-24 | End: 2019-05-24

## 2019-05-24 RX ORDER — BUPIVACAINE HYDROCHLORIDE 5 MG/ML
INJECTION, SOLUTION EPIDURAL; INTRACAUDAL
Status: DISCONTINUED | OUTPATIENT
Start: 2019-05-24 | End: 2019-05-24 | Stop reason: HOSPADM

## 2019-05-24 RX ORDER — TRAMADOL HYDROCHLORIDE 50 MG/1
50 TABLET ORAL EVERY 6 HOURS PRN
Qty: 30 TABLET | Refills: 0 | Status: SHIPPED | OUTPATIENT
Start: 2019-05-24 | End: 2019-06-03

## 2019-05-24 RX ORDER — PROPOFOL 10 MG/ML
VIAL (ML) INTRAVENOUS
Status: DISCONTINUED | OUTPATIENT
Start: 2019-05-24 | End: 2019-05-24

## 2019-05-24 RX ADMIN — SODIUM CHLORIDE, SODIUM LACTATE, POTASSIUM CHLORIDE, AND CALCIUM CHLORIDE 10 ML/HR: .6; .31; .03; .02 INJECTION, SOLUTION INTRAVENOUS at 08:05

## 2019-05-24 RX ADMIN — MUPIROCIN: 20 OINTMENT TOPICAL at 08:05

## 2019-05-24 RX ADMIN — VANCOMYCIN HYDROCHLORIDE 1000 MG: 1 INJECTION, POWDER, FOR SOLUTION INTRAVENOUS at 08:05

## 2019-05-24 RX ADMIN — FENTANYL CITRATE 50 MCG: 50 INJECTION, SOLUTION INTRAMUSCULAR; INTRAVENOUS at 10:05

## 2019-05-24 RX ADMIN — DEXAMETHASONE SODIUM PHOSPHATE 4 MG: 4 INJECTION, SOLUTION INTRAMUSCULAR; INTRAVENOUS at 10:05

## 2019-05-24 RX ADMIN — LIDOCAINE HYDROCHLORIDE 100 MG: 20 INJECTION, SOLUTION INTRAVENOUS at 10:05

## 2019-05-24 RX ADMIN — PROPOFOL 170 MG: 10 INJECTION, EMULSION INTRAVENOUS at 10:05

## 2019-05-24 RX ADMIN — ONDANSETRON 4 MG: 2 INJECTION, SOLUTION INTRAMUSCULAR; INTRAVENOUS at 10:05

## 2019-05-24 NOTE — PLAN OF CARE
Vss, anahi po fluids, denies pain, ambulates easily. Lt wrist ace wrap CDI. Positive sensation to fingers. IV removed, catheter intact. Discharge instructions provided and states understanding. States ready to go home.  Discharged from facility with family per wheelchair.

## 2019-05-24 NOTE — DISCHARGE SUMMARY
[facility]  Discharge Note  Short Stay    Admit Date: 5/24/2019    Discharge Date and Time: 5/24/2019    Attending Physician: Daniel Goode MD     Discharge Provider: Daniel Goode    Diagnoses:  Active Hospital Problems    Diagnosis  POA    *Bilateral carpal tunnel syndrome [G56.03]  Yes      Resolved Hospital Problems   No resolved problems to display.       Discharged Condition: good    Hospital Course: Patient was admitted for an outpatient procedure and tolerated the procedure well with no complications.    Final Diagnoses: Same as principal problem.    Disposition: Home or Self Care    Follow up/Patient Instructions:    Medications:  Reconciled Home Medications:      Medication List      START taking these medications    traMADol 50 mg tablet  Commonly known as:  ULTRAM  Take 1 tablet (50 mg total) by mouth every 6 (six) hours as needed for Pain.        CONTINUE taking these medications    acyclovir 5% 5 % ointment  Commonly known as:  ZOVIRAX  Apply topically 5 (five) times daily.     amitriptyline 25 MG tablet  Commonly known as:  ELAVIL  Take 1 tablet (25 mg total) by mouth nightly as needed for Insomnia.     aspirin 81 MG EC tablet  Commonly known as:  ECOTRIN  Take 1 tablet (81 mg total) by mouth every evening.     calcium-vitamin D3 500 mg(1,250mg) -200 unit per tablet  Commonly known as:  OS-MATTHEW 500 + D3  Take 1 tablet by mouth once daily.     fish oil-omega-3 fatty acids 300-1,000 mg capsule  Take by mouth every evening.     FLAXSEED OIL ORAL  Take by mouth once daily.     gabapentin 300 MG capsule  Commonly known as:  NEURONTIN  Take 1 capsule (300 mg total) by mouth nightly as needed.     mesalamine 800 mg Tbec  Commonly known as:  ASACOL  Take 800 mg by mouth 2 (two) times daily.     milk thistle 175 mg tablet  Take 175 mg by mouth 2 (two) times daily.     multivitamin capsule  Take 1 capsule by mouth once daily.     sertraline 100 MG tablet  Commonly known as:   ZOLOFT  TAKE 1 TABLET BY MOUTH TWICE DAILY     TURMERIC ROOT EXTRACT ORAL  Take by mouth every evening.     valACYclovir 1000 MG tablet  Commonly known as:  VALTREX  Take 1 tablet (1,000 mg total) by mouth daily as needed (outbreak).     VITAMIN B-12 50 mcg tablet  Generic drug:  cyanocobalamin (vitamin B-12)  Take 50 mcg by mouth once daily.     VITAMIN C 500 MG tablet  Generic drug:  ascorbic acid (vitamin C)  Take 500 mg by mouth once daily.          Discharge Procedure Orders   Call MD for:  temperature >100.4     Call MD for:  severe uncontrolled pain     Call MD for:  redness, tenderness, or signs of infection (pain, swelling, redness, odor or green/yellow discharge around incision site)     Remove dressing in 48 hours     Follow-up Information     Daniel Goode MD.    Specialties:  Sports Medicine, Orthopedic Surgery  Contact information:  [address] [place] [address] [address] Chu  [facility] [address]  [phone]                   Discharge Procedure Orders (must include Diet, Follow-up, Activity):   Discharge Procedure Orders (must include Diet, Follow-up, Activity)   Call MD for:  temperature >100.4     Call MD for:  severe uncontrolled pain     Call MD for:  redness, tenderness, or signs of infection (pain, swelling, redness, odor or green/yellow discharge around incision site)     Remove dressing in 48 hours

## 2019-05-24 NOTE — DISCHARGE INSTRUCTIONS
POST-OPERATIVE DISCHARGE INSTRUCTIONS  CARPAL TUNNEL RELEASE  Overview:  Although night time symptoms are often relieved immediately, other symptoms, such as constant numbness, weakness or clumsiness are due to nerve damage and may not be completely relieved. These resolve very gradually, and recovery may be incomplete. Maximum improvement may take 6 to 12 months. Occasionally, the numbness may be more obvious after surgery because the pain and tingling are improved.   Gentle exercise and light use of the hand are encouraged beginning the day after surgery. Tenderness around the scar usually lasts for 8-12 weeks after surgery. This may take 6 months to resolve completely.  You may not be able to return to all activities at home or at work immediately after surgery and activity modification and work modification may be necessary. A cast or splint may be helpful if strenuous activity is unavoidable.   Range of motion of the fingers is important after surgery and encouraged. It helps the nerve to glide so adhesions dont form. However, simultaneous wrist and finger flexion should be avoided for at least 3 weeks after surgery.       As the wound heals the scar tissue shrinks and matures. This results in adhesions that pull on the median nerve and often result in brief shooting or electrical pains with motion. This commonly happens when you stretch your hand out to reach an item at arm's length. Sudden shooting or electrical shock pains may also occur spontaneously while you are doing nothing. Both of these are normal and improve with time.   Scar formation results in a lump at the base of the palm. This is noticeable when you lean on the hand or push off. This is normal and improves with time and massage. Edema (swelling) control, scar massage and desensitization are initiated when the incision is healed. Scar massage can be started approximately 2 weeks after surgery if the wound has healed. Use a non-perfumed lotion  (Vitamin E lotion is OK). Gentle but firm pressure is applied to the wound and massaged in a circular fashion. This can be performed for a few minutes 5 times a day at first and over time less frequently as the tenderness improves.  strength is usually weak for 2 to 3 months following surgery. Full recovery is expected but may take 6 months.   You can resume light activities within your own tolerance, including driving, as soon as you feel comfortable enough to do so. Please use common sense and avoid activities that hurt. Sutures are removed approximately 2 weeks after surgery.    General Post Operative Instructions:    · Do not drink alcoholic beverages including beer for 24 hours or as long as you are on pain medicine.  · Do not drive a motor vehicle, operate machinery or power tools, or sign legal papers for 24 hours or as long as you are on pain medication.  · You may experience light-headedness, dizziness and sleepiness following surgery.  Please do not stay alone.  A responsible adult should be with you for this 24 hour period.  · Go home and rest.  · Progress slowly to a normal diet unless instructed.  Otherwise, begin with liquids, soup and crackers, advance to solid foods.  · Certain anesthetics and pain medications may produce nausea and vomiting.  If nausea becomes a problem, call your doctor.  · A nurse will be calling you sometime after surgery. Do not be alarmed. This is our way of finding out how you are doing.  · Several times every hour while you are awake, take 2-3 deep breaths and cough.  If you have had abdominal surgery, support your stomach with a pillow firmly. This will prevent pneumonia.  · Several times every hour while you are awake, pump and flex your feet 5-6 times and do foot circles. This will help prevent blood clots.  · Call your doctor for severe pain, bleeding, fever, or signs of infection (pain, swelling, redness, foul odor, drainage).      Discharge Instructions: After Your  "Surgery/Procedure  Youve just had surgery. During surgery you were given medicine called anesthesia to keep you relaxed and free of pain. After surgery you may have some pain or nausea. This is common. Here are some tips for feeling better and getting well after surgery.     Stay on schedule with your medication.   Going home  Your doctor or nurse will show you how to take care of yourself when you go home. He or she will also answer your questions. Have an adult family member or friend drive you home.      For your safety we recommend these precaution for the first 24 hours after your procedure:  · Do not drive or use heavy equipment.  · Do not make important decisions or sign legal papers.  · Do not drink alcohol.  · Have someone stay with you, if needed. He or she can watch for problems and help keep you safe.  · Your concentration, balance, coordination, and judgement may be impaired for many hours after anesthesia.  Use caution when ambulating or standing up.     · You may feel weak and "washed out" after anesthesia and surgery.      Subtle residual effects of general anesthesia or sedation with regional / local anesthesia can last more than 24 hours.  Rest for the remainder of the day or longer if your Doctor/Surgeon has advised you to do so.  Although you may feel normal within the first 24 hours, your reflexes and mental ability may be impaired without you realizing it.  You may feel dizzy, lightheaded or sleepy for 24 hours or longer.      Be sure to go to all follow-up visits with your doctor. And rest after your surgery for as long as your doctor tells you to.  Coping with pain  If you have pain after surgery, pain medicine will help you feel better. Take it as told, before pain becomes severe. Also, ask your doctor or pharmacist about other ways to control pain. This might be with heat, ice, or relaxation. And follow any other instructions your surgeon or nurse gives you.  Tips for taking pain " medicine  To get the best relief possible, remember these points:  · Pain medicines can upset your stomach. Taking them with a little food may help.  · Most pain relievers taken by mouth need at least 20 to 30 minutes to start to work.  · Taking medicine on a schedule can help you remember to take it. Try to time your medicine so that you can take it before starting an activity. This might be before you get dressed, go for a walk, or sit down for dinner.  · Constipation is a common side effect of pain medicines. Call your doctor before taking any medicines such as laxatives or stool softeners to help ease constipation. Also ask if you should skip any foods. Drinking lots of fluids and eating foods such as fruits and vegetables that are high in fiber can also help. Remember, do not take laxatives unless your surgeon has prescribed them.  · Drinking alcohol and taking pain medicine can cause dizziness and slow your breathing. It can even be deadly. Do not drink alcohol while taking pain medicine.  · Pain medicine can make you react more slowly to things. Do not drive or run machinery while taking pain medicine.  Your health care provider may tell you to take acetaminophen to help ease your pain. Ask him or her how much you are supposed to take each day. Acetaminophen or other pain relievers may interact with your prescription medicines or other over-the-counter (OTC) drugs. Some prescription medicines have acetaminophen and other ingredients. Using both prescription and OTC acetaminophen for pain can cause you to overdose. Read the labels on your OTC medicines with care. This will help you to clearly know the list of ingredients, how much to take, and any warnings. It may also help you not take too much acetaminophen. If you have questions or do not understand the information, ask your pharmacist or health care provider to explain it to you before you take the OTC medicine.  Managing nausea  Some people have an upset  stomach after surgery. This is often because of anesthesia, pain, or pain medicine, or the stress of surgery. These tips will help you handle nausea and eat healthy foods as you get better. If you were on a special food plan before surgery, ask your doctor if you should follow it while you get better. These tips may help:  · Do not push yourself to eat. Your body will tell you when to eat and how much.  · Start off with clear liquids and soup. They are easier to digest.  · Next try semi-solid foods, such as mashed potatoes, applesauce, and gelatin, as you feel ready.  · Slowly move to solid foods. Dont eat fatty, rich, or spicy foods at first.  · Do not force yourself to have 3 large meals a day. Instead eat smaller amounts more often.  · Take pain medicines with a small amount of solid food, such as crackers or toast, to avoid nausea.     Call your surgeon if  · You still have pain an hour after taking medicine. The medicine may not be strong enough.  · You feel too sleepy, dizzy, or groggy. The medicine may be too strong.  · You have side effects like nausea, vomiting, or skin changes, such as rash, itching, or hives.       If you have obstructive sleep apnea  You were given anesthesia medicine during surgery to keep you comfortable and free of pain. After surgery, you may have more apnea spells because of this medicine and other medicines you were given. The spells may last longer than usual.   At home:  · Keep using the continuous positive airway pressure (CPAP) device when you sleep. Unless your health care provider tells you not to, use it when you sleep, day or night. CPAP is a common device used to treat obstructive sleep apnea.  · Talk with your provider before taking any pain medicine, muscle relaxants, or sedatives. Your provider will tell you about the possible dangers of taking these medicines.  © 4741-3112 [employer] [address] [place] [address], [place] [address]. All rights reserved. This  information is not intended as a substitute for professional medical care. Always follow your healthcare professional's instructions.    Post op instructions for prevention of DVT  What is deep vein thrombosis?  Deep vein thrombosis (DVT) is the medical term for blood clots in the deep veins of the leg.  These blood clots can be dangerous.  A DVT can block a blood vessel and keep blood from getting where it needs to go.  Another problem is that the clot can travel to other parts of the body such as the lungs.  A clot that travels to the lungs is called a pulmonary embolus (PE) and can cause serious problems with breathing which can lead to death.  Am I at risk for DVT/PE?  If you are not very active, you are at risk of DVT.  Anyone confined to bed, sitting for long periods of time, recovering from surgery, etc. increases the risk of DVT.  Other risk factors are cancer diagnosis, certain medications, estrogen replacement in any form,older age, obesity, pregnancy, smoking, history of clotting disorders, and dehydration.  How will I know if I have a DVT?   Swelling in the lower leg   Pain   Warmth, redness, hardness or bulging of the vein  If you have any of these symptoms, call your doctors office right away.  Some people will not have any symptoms until the clot moves to the lungs.  What are the symptoms of a PE?   Panting, shortness of breath, or trouble breathing   Sharp, knife-like chest pain when you breathe   Coughing or coughing up blood   Rapid heartbeat  If you have any of these symptoms or get worse quickly, call 911 for emergency treatment.  How can I prevent a DVT?   Avoid long periods of inactivity and dont cross your legs--get up and walk around every hour or so.   Stay active--walking after surgery is highly encouraged.  This means you should get out of the house and walk in the neighborhood.  Going up and down stairs will not impair healing (unless advised against such activity by your  doctor).     Drink plenty of noncaffeinated, nonalcoholic fluids each day to prevent dehydration.   Wear special support stockings, if they have been advised by your doctor.   If you travel, stop at least once an hour and walk around.   Avoid smoking (assistance with stopping is available through your healthcare provider)  Always notify your doctor if you are not able to follow the post operative instructions that are given to you at the time of discharge.  It may be necessary to prescribe one of the medications available to prevent DVT.

## 2019-05-24 NOTE — OP NOTE
[facility]  Orthopedic Surgery  Operative Note    SUMMARY     Date of Procedure: 5/24/2019     Procedure: Procedure(s) (LRB):  RELEASE, CARPAL TUNNEL (Left)       Surgeon(s) and Role:     * Daniel Goode MD - Primary    Assistant: Negrito Hay    Pre-Operative Diagnosis: Bilateral carpal tunnel syndrome [G56.03]    Post-Operative Diagnosis: Post-Op Diagnosis Codes:     * Bilateral carpal tunnel syndrome [G56.03]    Anesthesia: General    Complications: No    Estimated Blood Loss (EBL): 3ml    Tourniquet time: 3min at 250mmHg           Implants: * No implants in log *    Specimens:   Specimen (12h ago, onward)    None                  Condition: Good    Disposition: PACU - hemodynamically stable.    Attestation: I was present and scrubbed for the entire procedure.    Indications for the procedure: This is a 74 -year-old female with a history of carpal tunnel syndrome.  Patient had an EMG and nerve conduction velocity that confirmed the diagnosis.  Patient has failed conservative measures and wished to proceed with surgery.    Procedure IN DETAIL: The risks, benefits and alternatives of the procedure were   explained to the patient including, but not limited to damage to nerves, arteries   and blood vessels. I also explained risk of infection, pillar pain, possible   continued pain due to permanent injury to the nerve as well as recurrence and   general anesthetic complications including seizure, stroke, heart attack and   death. Pt understood this and signed informed consent. The patient's  Left  Hand was marked prior to coming to the operating room. Once there, a formal   time out was done in which the correct patient, procedure, and op site were all   correctly identified and confirmed by the entire operating team,2g of   Ancef was given prior to surgical incision. General anesthesia was induced. The Left  upper extremity was prepped and draped in normal sterile fashion. Arm was  exsanguinated and tourniquet inflated up to 250 mmHg.   Standard carpal tunnel incision was made just ulnar to the central palmar   crease. This was taken down through skin and soft tissue until the transverse   carpal ligament was identified. Under loupe magnification the transverse carpal   ligament was incised proximally, once a little gap was made in the carpal   tunnel, a hemostat was used to slip underneath the ligament and under direct   visualization, the ligament was sharply incised directly over the median nerve.   Blunt dissection was then used to release the tissue distally. Once it was   completely free we then dorsiflexed the wrist and under direct visualization   released the rest of the proximal portion of the transverse carpal ligament.   The nerve was then inspected and seemed to be intact. There were no masses in the carpal tunnel under the nerve or flexor tendons. Hemostat was used to   spread both proximally and distally and there were no residual adhesions. We   then proceeded with closing. The wound was copiously irrigated using normal   saline with  irrigant. Tourniquet was released. Meticulous hemostasis was   obtained with the bipolar cautery. We then closed the skin using a 3-0 nylon in   a simple interrupted fashion. A sterile dressing was applied including   Adaptic, 4 x 4s, Kerlex as well as an Coban. The patient was   awakened, transferred from the operating room to the recovery room in stable   condition. Postop course will be for a carpal tunnel release.

## 2019-05-24 NOTE — ANESTHESIA PREPROCEDURE EVALUATION
05/24/2019  Zara Ann is a 74 y.o., female.    Anesthesia Evaluation    I have reviewed the Patient Summary Reports.    I have reviewed the Nursing Notes.   I have reviewed the Medications.     Review of Systems  Anesthesia Hx:  Denies Family Hx of Anesthesia complications.  Personal Hx of Anesthesia complications, Post-Operative Nausea/Vomiting, in the past, but not with recent anesthetics / prophylaxis   Pulmonary:   Asthma mild    Hepatic/GI:   GERD, well controlled Liver Disease,    Musculoskeletal:   Arthritis   Spine Disorders: cervical    Neurological:   Denies Neuromuscular Disease.    Psych:   Psychiatric History          Physical Exam  General:  Well nourished    Airway/Jaw/Neck:  Airway Findings: Mouth Opening: Normal Tongue: Normal  General Airway Assessment: Adult  Mallampati: II  TM Distance: Normal, at least 6 cm  Jaw/Neck Findings:  Neck ROM: Extension Decreased, Mod.      Dental:  Dental Findings: In tact   Chest/Lungs:  Chest/Lungs Clear    Heart/Vascular:  Heart Findings: Normal            Anesthesia Plan  Type of Anesthesia, risks & benefits discussed:  Anesthesia Type:  general  Patient's Preference:   Intra-op Monitoring Plan: standard ASA monitors  Intra-op Monitoring Plan Comments:   Post Op Pain Control Plan:   Post Op Pain Control Plan Comments:   Induction:   IV  Beta Blocker:  Patient is not currently on a Beta-Blocker (No further documentation required).       Informed Consent: Patient understands risks and agrees with Anesthesia plan.  Questions answered. Anesthesia consent signed with patient.  ASA Score: 2     Day of Surgery Review of History & Physical:    H&P update referred to the surgeon.         Ready For Surgery From Anesthesia Perspective.

## 2019-05-24 NOTE — PLAN OF CARE
Pt transferred to phase II. VSS, denies pain, dressing to L hand cdi, ice pack in place to L hand and L hand elevated, tolerated clear liquids without N/V. Report given to FADIA Dumont.

## 2019-05-24 NOTE — TRANSFER OF CARE
Anesthesia Transfer of Care Note    Patient: Zara Ann    Procedure(s) Performed: Procedure(s) (LRB):  RELEASE, CARPAL TUNNEL (Left)    Patient location: PACU    Anesthesia Type: general    Transport from OR: Transported from OR on 2-3 L/min O2 by NC with adequate spontaneous ventilation    Post pain: adequate analgesia    Post assessment: no apparent anesthetic complications and tolerated procedure well    Post vital signs: stable    Level of consciousness: sedated    Nausea/Vomiting: no nausea/vomiting    Complications: none    Transfer of care protocol was followed      Last vitals:   Visit Vitals  /61 (BP Location: Right arm, Patient Position: Lying)   Pulse 69   Temp 36.8 °C (98.2 °F)   Resp (!) 96   Breastfeeding? No

## 2019-05-24 NOTE — ANESTHESIA POSTPROCEDURE EVALUATION
Anesthesia Post Evaluation    Patient: Zara Ann    Procedure(s) Performed: Procedure(s) (LRB):  RELEASE, CARPAL TUNNEL (Left)    Final Anesthesia Type: general  Patient location during evaluation: PACU  Patient participation: Yes- Able to Participate  Level of consciousness: awake and alert  Post-procedure vital signs: reviewed and stable  Pain management: adequate  Airway patency: patent  PONV status at discharge: No PONV  Anesthetic complications: no      Cardiovascular status: blood pressure returned to baseline  Respiratory status: unassisted  Hydration status: euvolemic  Follow-up not needed.          Vitals Value Taken Time   /63 5/24/2019 11:50 AM   Temp 36.5 °C (97.7 °F) 5/24/2019 11:35 AM   Pulse 57 5/24/2019 11:50 AM   Resp 16 5/24/2019 11:50 AM   SpO2 97 % 5/24/2019 11:50 AM         Event Time     Out of Recovery 11:35:00          Pain/Faith Score: Faith Score: 10 (5/24/2019 12:00 PM)

## 2019-05-27 VITALS
HEART RATE: 57 BPM | DIASTOLIC BLOOD PRESSURE: 63 MMHG | RESPIRATION RATE: 16 BRPM | OXYGEN SATURATION: 97 % | SYSTOLIC BLOOD PRESSURE: 132 MMHG | TEMPERATURE: 98 F

## 2019-05-27 RX ORDER — SERTRALINE HYDROCHLORIDE 100 MG/1
TABLET, FILM COATED ORAL
Qty: 180 TABLET | Refills: 1 | Status: SHIPPED | OUTPATIENT
Start: 2019-05-27 | End: 2019-12-27

## 2019-06-05 ENCOUNTER — OFFICE VISIT (OUTPATIENT)
Dept: ORTHOPEDICS | Facility: CLINIC | Age: 74
End: 2019-06-05
Payer: MEDICARE

## 2019-06-05 VITALS — BODY MASS INDEX: 27.42 KG/M2 | RESPIRATION RATE: 18 BRPM | HEIGHT: 62 IN | WEIGHT: 149 LBS

## 2019-06-05 DIAGNOSIS — G56.03 BILATERAL CARPAL TUNNEL SYNDROME: Primary | ICD-10-CM

## 2019-06-05 PROCEDURE — 99999 PR PBB SHADOW E&M-EST. PATIENT-LVL III: CPT | Mod: PBBFAC,,, | Performed by: ORTHOPAEDIC SURGERY

## 2019-06-05 PROCEDURE — 99999 PR PBB SHADOW E&M-EST. PATIENT-LVL III: ICD-10-PCS | Mod: PBBFAC,,, | Performed by: ORTHOPAEDIC SURGERY

## 2019-06-05 PROCEDURE — 99024 POSTOP FOLLOW-UP VISIT: CPT | Mod: S$GLB,,, | Performed by: ORTHOPAEDIC SURGERY

## 2019-06-05 PROCEDURE — 99024 PR POST-OP FOLLOW-UP VISIT: ICD-10-PCS | Mod: S$GLB,,, | Performed by: ORTHOPAEDIC SURGERY

## 2019-06-05 NOTE — PROGRESS NOTES
Past Medical History:   Diagnosis Date    Cancer     left arm skin    Depression     Diverticulosis     Fatty liver     Genital herpes     GERD (gastroesophageal reflux disease)     Gout     History of use of hearing aid in left ear     Osteopenia     RAD (reactive airway disease)     Rheumatic fever     UC (ulcerative colitis)        Past Surgical History:   Procedure Laterality Date    APPENDECTOMY      CHOLECYSTECTOMY      COLONOSCOPY  2013    every 2 yrs    DECOMPRESSION AND FUSION, SPINE, CERVICAL, ANTERIOR APPROACH  C5-C7 ANTERIOR CERVICAL DECOMPRESSION AND FUSION N/A 10/22/2018    Performed by Dem[facility]ri Caro MD at [facility] OR    HYSTERECTOMY      parital - benign - fibroids    Rectocele  1983    with hysterectomy    RELEASE, CARPAL TUNNEL Left 5/24/2019    Performed by Daniel Goode MD at [facility] OR       Current Outpatient Medications   Medication Sig    acyclovir 5% (ZOVIRAX) 5 % ointment Apply topically 5 (five) times daily. (Patient taking differently: Apply topically 5 (five) times daily. )    amitriptyline (ELAVIL) 25 MG tablet Take 1 tablet (25 mg total) by mouth nightly as needed for Insomnia.    ascorbic acid (VITAMIN C) 500 MG tablet Take 500 mg by mouth once daily.    aspirin (ECOTRIN) 81 MG EC tablet Take 1 tablet (81 mg total) by mouth every evening.    calcium-vitamin D3 500 mg(1,250mg) -200 unit per tablet Take 1 tablet by mouth once daily.     cyanocobalamin, vitamin B-12, (VITAMIN B-12) 50 mcg tablet Take 50 mcg by mouth once daily.    fish oil-omega-3 fatty acids 300-1,000 mg capsule Take by mouth every evening.     FLAXSEED OIL ORAL Take by mouth once daily.    gabapentin (NEURONTIN) 300 MG capsule Take 1 capsule (300 mg total) by mouth nightly as needed.    mesalamine (ASACOL) 800 mg TbEC Take 800 mg by mouth 2 (two) times daily.     milk thistle 175 mg tablet Take 175 mg by mouth 2 (two) times daily.     multivitamin capsule Take 1 capsule by mouth  once daily.    sertraline (ZOLOFT) 100 MG tablet TAKE 1 TABLET BY MOUTH TWICE DAILY    TURMERIC ROOT EXTRACT ORAL Take by mouth every evening.     valACYclovir (VALTREX) 1000 MG tablet Take 1 tablet (1,000 mg total) by mouth daily as needed (outbreak).     No current facility-administered medications for this visit.      Facility-Administered Medications Ordered in Other Visits   Medication    lactated ringers infusion    lidocaine (PF) 10 mg/ml (1%) injection 10 mg    sodium chloride 0.9% flush 3 mL       Review of patient's allergies indicates:   Allergen Reactions    Ciprofloxacin Shortness Of Breath    Clindamycin Shortness Of Breath    Nitrofurantoin monohyd/m-cryst Shortness Of Breath, Anaphylaxis and Itching    Sulfa (sulfonamide antibiotics) Hives    Cephalosporins Hives    Hydrocod-cpm-pe-acetaminophen     Hydrocodone Itching    Hydrocortisone Nausea Only    Sulfamethoxazole-trimethoprim Itching       Family History   Problem Relation Age of Onset    Cancer Mother         colon    Cancer Father         kidney     Ulcers Sister     Pancreatitis Sister     Breast cancer Sister     Liver cancer Paternal Aunt     Allergic rhinitis Neg Hx     Allergies Neg Hx     Angioedema Neg Hx     Asthma Neg Hx     Atopy Neg Hx     Eczema Neg Hx     Rhinitis Neg Hx     Urticaria Neg Hx     Immunodeficiency Neg Hx        Social History     Socioeconomic History    Marital status:      Spouse name: Not on file    Number of children: Not on file    Years of education: Not on file    Highest education level: Not on file   Occupational History    Not on file   Social Needs    Financial resource strain: Not on file    Food insecurity:     Worry: Not on file     Inability: Not on file    Transportation needs:     Medical: Not on file     Non-medical: Not on file   Tobacco Use    Smoking status: Never Smoker    Smokeless tobacco: Never Used   Substance and Sexual Activity    Alcohol  use: Yes     Comment: rarely     Drug use: No    Sexual activity: Not on file   Lifestyle    Physical activity:     Days per week: Not on file     Minutes per session: Not on file    Stress: Not on file   Relationships    Social connections:     Talks on phone: Not on file     Gets together: Not on file     Attends [employer] service: Not on file     Active member of club or organization: Not on file     Attends meetings of clubs or organizations: Not on file     Relationship status: Not on file   Other Topics Concern    Not on file   Social History Narrative    Not on file       Chief Complaint:   Chief Complaint   Patient presents with    Hand Pain     L hand s/p CTR 5/24/19        Date of surgery:  May 24, 2019 left carpal tunnel release    History of present illness:  74-year-old female who is 2 weeks out from left carpal tunnel release.  Patient still complains of numbness and pain in the middle finger.  Thumb is better in the index is slightly better.  Pain is a 3/10.  No wound issues.      Review of Systems:    Musculoskeletal:  See HPI        Physical Examination:    Vital Signs:    Vitals:    06/05/19 1110   Resp: 18       Body mass index is 27.25 kg/m².    This a well-developed, well nourished patient in no acute distress.  They are alert and oriented and cooperative to examination.  Pt. walks without an antalgic gait.      Examination left hand shows well-healing surgical incision. No erythema or drainage.  Neurovascularly intact.    X-rays:  None     Assessment::  Status post left carpal tunnel Release    Plan:  I took out the stitches.  Placed her in a Gel-Foam brace and put Steri-Strips on.  Follow-up in a month for final wound check.    This note was created using [employer] voice recognition software that occasionally misinterpreted phrases or words.

## 2019-06-11 ENCOUNTER — OFFICE VISIT (OUTPATIENT)
Dept: FAMILY MEDICINE | Facility: CLINIC | Age: 74
End: 2019-06-11
Payer: MEDICARE

## 2019-06-11 VITALS
TEMPERATURE: 98 F | WEIGHT: 150.38 LBS | RESPIRATION RATE: 18 BRPM | SYSTOLIC BLOOD PRESSURE: 120 MMHG | DIASTOLIC BLOOD PRESSURE: 68 MMHG | OXYGEN SATURATION: 97 % | HEIGHT: 62 IN | HEART RATE: 77 BPM | BODY MASS INDEX: 27.67 KG/M2

## 2019-06-11 DIAGNOSIS — R20.2 PARESTHESIA OF BOTH HANDS: ICD-10-CM

## 2019-06-11 DIAGNOSIS — F33.40 RECURRENT MAJOR DEPRESSIVE DISORDER, IN REMISSION: ICD-10-CM

## 2019-06-11 DIAGNOSIS — Z86.006 HISTORY OF MELANOMA IN SITU: ICD-10-CM

## 2019-06-11 DIAGNOSIS — R20.2 NUMBNESS AND TINGLING OF UPPER AND LOWER EXTREMITIES OF BOTH SIDES: Primary | ICD-10-CM

## 2019-06-11 DIAGNOSIS — R20.0 NUMBNESS AND TINGLING OF UPPER AND LOWER EXTREMITIES OF BOTH SIDES: Primary | ICD-10-CM

## 2019-06-11 PROCEDURE — 3074F SYST BP LT 130 MM HG: CPT | Mod: CPTII,S$GLB,, | Performed by: FAMILY MEDICINE

## 2019-06-11 PROCEDURE — 3288F FALL RISK ASSESSMENT DOCD: CPT | Mod: CPTII,S$GLB,, | Performed by: FAMILY MEDICINE

## 2019-06-11 PROCEDURE — 99214 OFFICE O/P EST MOD 30 MIN: CPT | Mod: S$GLB,,, | Performed by: FAMILY MEDICINE

## 2019-06-11 PROCEDURE — 1100F PTFALLS ASSESS-DOCD GE2>/YR: CPT | Mod: CPTII,S$GLB,, | Performed by: FAMILY MEDICINE

## 2019-06-11 PROCEDURE — 3074F PR MOST RECENT SYSTOLIC BLOOD PRESSURE < 130 MM HG: ICD-10-PCS | Mod: CPTII,S$GLB,, | Performed by: FAMILY MEDICINE

## 2019-06-11 PROCEDURE — 99499 RISK ADDL DX/OHS AUDIT: ICD-10-PCS | Mod: S$GLB,,, | Performed by: FAMILY MEDICINE

## 2019-06-11 PROCEDURE — 1100F PR PT FALLS ASSESS DOC 2+ FALLS/FALL W/INJURY/YR: ICD-10-PCS | Mod: CPTII,S$GLB,, | Performed by: FAMILY MEDICINE

## 2019-06-11 PROCEDURE — 3078F PR MOST RECENT DIASTOLIC BLOOD PRESSURE < 80 MM HG: ICD-10-PCS | Mod: CPTII,S$GLB,, | Performed by: FAMILY MEDICINE

## 2019-06-11 PROCEDURE — 99499 UNLISTED E&M SERVICE: CPT | Mod: S$GLB,,, | Performed by: FAMILY MEDICINE

## 2019-06-11 PROCEDURE — 3288F PR FALLS RISK ASSESSMENT DOCUMENTED: ICD-10-PCS | Mod: CPTII,S$GLB,, | Performed by: FAMILY MEDICINE

## 2019-06-11 PROCEDURE — 3078F DIAST BP <80 MM HG: CPT | Mod: CPTII,S$GLB,, | Performed by: FAMILY MEDICINE

## 2019-06-11 PROCEDURE — 99214 PR OFFICE/OUTPT VISIT, EST, LEVL IV, 30-39 MIN: ICD-10-PCS | Mod: S$GLB,,, | Performed by: FAMILY MEDICINE

## 2019-06-11 PROCEDURE — 99999 PR PBB SHADOW E&M-EST. PATIENT-LVL IV: ICD-10-PCS | Mod: PBBFAC,,, | Performed by: FAMILY MEDICINE

## 2019-06-11 PROCEDURE — 99999 PR PBB SHADOW E&M-EST. PATIENT-LVL IV: CPT | Mod: PBBFAC,,, | Performed by: FAMILY MEDICINE

## 2019-06-11 RX ORDER — CEPHRADINE 500 MG
1 CAPSULE ORAL DAILY
COMMUNITY
Start: 2019-05-02 | End: 2021-07-06

## 2019-06-11 RX ORDER — AMITRIPTYLINE HYDROCHLORIDE 25 MG/1
25 TABLET, FILM COATED ORAL NIGHTLY PRN
Qty: 90 TABLET | Refills: 1 | Status: SHIPPED | OUTPATIENT
Start: 2019-06-11 | End: 2020-07-22

## 2019-06-11 NOTE — PROGRESS NOTES
Subjective:       Patient ID: Zara Ann is a 74 y.o. female.    Chief Complaint: Follow-up    HPI  Patient in the office for f/u and review.  She notes that she broke her little toe, recalls fx.  Similarly, having ongoing issues with her hands - burning at night, pins and needles. She had tried gabapentin without relief (caused constipation). Went to ortho who gave her voltaren gel and prednisone pack. Notes significant relief with steroids. Has since seen ortho/yajaira for CTS release. She is still having some pins/needles and numbness to the L hand. No burning, however.    She has a post-op appt with ortho/yajaira in a few weeks.  The same day that she had her stitches out, she slipped in the store, and hurt her R hand, elbow, and knee. She recalls that she was trying to fall without hitting the L hand. Better now.   Had previously started the lower back discussion with neuro/jeffrey. Reviewed consult note from 2/2019. Pt due for f/u this summer.  She does not recall any previous memory concerns.    She denies any overt cp or palp. No ORTEGA. Very rare mid- chest discomfort for a few seconds that then resolves. Nonexertional. She says she does not want to redo any type of cardiac studies at this time.     Review of Systems:  Review of Systems   Constitutional: Negative for fatigue and unexpected weight change.   HENT: Negative for congestion, postnasal drip and rhinorrhea.    Respiratory: Negative for cough and shortness of breath.    Cardiovascular: Negative for chest pain, palpitations and leg swelling.   Gastrointestinal: Negative for constipation (occ), diarrhea and nausea.   Genitourinary: Negative for difficulty urinating and dysuria.   Musculoskeletal: Negative for arthralgias and back pain.   Skin: Negative for color change and rash.   Neurological: Negative for dizziness and headaches.   Psychiatric/Behavioral: Positive for confusion (some memory slips) and sleep disturbance. Negative for dysphoric  "mood (doing ok but not great).       Objective:     Vitals:    06/11/19 1348   BP: 120/68   BP Location: Right arm   Patient Position: Sitting   BP Method: Large (Manual)   Pulse: 77   Resp: 18   Temp: 97.6 °F (36.4 °C)   TempSrc: Oral   SpO2: 97%   Weight: 68.2 kg (150 lb 5.7 oz)   Height: 5' 2" (1.575 m)          Physical Exam   Constitutional: She is oriented to person, place, and time. She appears well-developed and well-nourished. No distress.   HENT:   Head: Normocephalic and atraumatic.   Eyes: Conjunctivae are normal. Right eye exhibits no discharge. Left eye exhibits no discharge. No scleral icterus.   Neck: Normal range of motion. Neck supple.   Cardiovascular: Normal rate and regular rhythm.   Pulmonary/Chest: Effort normal and breath sounds normal. No respiratory distress.   Abdominal: Soft. She exhibits no distension.   Musculoskeletal: Normal range of motion. She exhibits no edema.   Neurological: She is alert and oriented to person, place, and time.   Skin: Skin is warm and dry. No rash noted.   Psychiatric: She has a normal mood and affect. Her behavior is normal. Her speech is rapid and/or pressured and tangential (mild-moderate).   Nursing note and vitals reviewed.        Assessment & Plan:  Numbness and tingling of upper and lower extremities of both sides  Schedule f/u with neuro to continue discussion since CTS of L  Hand has been corrected.  Paresthesia of both hands  -     amitriptyline (ELAVIL) 25 MG tablet; Take 1 tablet (25 mg total) by mouth nightly as needed for Insomnia.  Dispense: 90 tablet; Refill: 1  Pt uses prn for rest.  Recurrent major depressive disorder, in remission  Stable on zoloft.   History of melanoma in situ  -     Ambulatory referral to Dermatology  Discussed need for routine skin monitoring.    "

## 2019-06-20 ENCOUNTER — INITIAL CONSULT (OUTPATIENT)
Dept: DERMATOLOGY | Facility: CLINIC | Age: 74
End: 2019-06-20
Payer: MEDICARE

## 2019-06-20 VITALS — HEIGHT: 62 IN | WEIGHT: 150 LBS | BODY MASS INDEX: 27.6 KG/M2

## 2019-06-20 DIAGNOSIS — T14.8XXA EXCORIATION: ICD-10-CM

## 2019-06-20 DIAGNOSIS — Z85.820 PERSONAL HISTORY OF MALIGNANT MELANOMA OF SKIN: ICD-10-CM

## 2019-06-20 DIAGNOSIS — D18.00 ANGIOMA: ICD-10-CM

## 2019-06-20 DIAGNOSIS — L81.4 LENTIGINES: ICD-10-CM

## 2019-06-20 DIAGNOSIS — D22.9 MULTIPLE BENIGN NEVI: Primary | ICD-10-CM

## 2019-06-20 DIAGNOSIS — L82.1 SEBORRHEIC KERATOSES: ICD-10-CM

## 2019-06-20 PROCEDURE — 99214 OFFICE O/P EST MOD 30 MIN: CPT | Mod: S$GLB,,, | Performed by: DERMATOLOGY

## 2019-06-20 PROCEDURE — 99999 PR PBB SHADOW E&M-EST. PATIENT-LVL II: ICD-10-PCS | Mod: PBBFAC,,, | Performed by: DERMATOLOGY

## 2019-06-20 PROCEDURE — 99999 PR PBB SHADOW E&M-EST. PATIENT-LVL II: CPT | Mod: PBBFAC,,, | Performed by: DERMATOLOGY

## 2019-06-20 PROCEDURE — 1101F PT FALLS ASSESS-DOCD LE1/YR: CPT | Mod: CPTII,S$GLB,, | Performed by: DERMATOLOGY

## 2019-06-20 PROCEDURE — 1101F PR PT FALLS ASSESS DOC 0-1 FALLS W/OUT INJ PAST YR: ICD-10-PCS | Mod: CPTII,S$GLB,, | Performed by: DERMATOLOGY

## 2019-06-20 PROCEDURE — 99214 PR OFFICE/OUTPT VISIT, EST, LEVL IV, 30-39 MIN: ICD-10-PCS | Mod: S$GLB,,, | Performed by: DERMATOLOGY

## 2019-06-20 NOTE — PROGRESS NOTES
Subjective:       Patient ID:  Zara Ann is a 74 y.o. female who presents for   Chief Complaint   Patient presents with    Skin Check     73 y/o F presents for skin check. Last OV with  09/04/2018 at which time the MMIS was biopsied.  She was referred to Dr May for excision.  She admits to scratching and picking at any new bump that appears.  She has multiple moles and sun spots but has not noticed any new or changing lesions since last OV.     Derm Hx: MMIS 9/2018  Denies family h/o melanoma  .  Past Medical History:   Diagnosis Date    Cancer     left arm skin    Depression     Diverticulosis     Fatty liver     Genital herpes     GERD (gastroesophageal reflux disease)     Gout     History of use of hearing aid in left ear     Hx of rheumatic fever 4/28/2015    Osteopenia     RAD (reactive airway disease)     Rheumatic fever     UC (ulcerative colitis)      Review of Systems   Skin: Negative for daily sunscreen use (avoids sun as much as possible), activity-related sunscreen use and wears hat.   Hematologic/Lymphatic: Does not bruise/bleed easily.   Allergic/Immunologic: Negative for environmental allergies.        Objective:    Physical Exam   Constitutional: She appears well-developed and well-nourished. No distress.   HENT:   Mouth/Throat: Lips normal.    Eyes: Lids are normal.  No conjunctival no injection.   Neurological: She is alert and oriented to person, place, and time. She is not disoriented.   Psychiatric: She has a normal mood and affect.   Skin:   Areas Examined (abnormalities noted in diagram):   Scalp / Hair Palpated and Inspected  Head / Face Inspection Performed  Neck Inspection Performed  Chest / Axilla Inspection Performed  Abdomen Inspection Performed  Genitals / Buttocks / Groin Inspection Performed  Back Inspection Performed  RUE Inspected  LUE Inspection Performed  RLE Inspected  LLE Inspection Performed  Nails and Digits Inspection Performed               Diagram Legend     Erythematous scaling macule/papule c/w actinic keratosis       Vascular papule c/w angioma      Pigmented verrucoid papule/plaque c/w seborrheic keratosis      Yellow umbilicated papule c/w sebaceous hyperplasia      Irregularly shaped tan macule c/w lentigo     1-2 mm smooth white papules consistent with Milia      Movable subcutaneous cyst with punctum c/w epidermal inclusion cyst      Subcutaneous movable cyst c/w pilar cyst      Firm pink to brown papule c/w dermatofibroma      Pedunculated fleshy papule(s) c/w skin tag(s)      Evenly pigmented macule c/w junctional nevus     Mildly variegated pigmented, slightly irregular-bordered macule c/w mildly atypical nevus      Flesh colored to evenly pigmented papule c/w intradermal nevus       Pink pearly papule/plaque c/w basal cell carcinoma      Erythematous hyperkeratotic cursted plaque c/w SCC      Surgical scar with no sign of skin cancer recurrence      Open and closed comedones      Inflammatory papules and pustules      Verrucoid papule consistent consistent with wart     Erythematous eczematous patches and plaques     Dystrophic onycholytic nail with subungual debris c/w onychomycosis     Umbilicated papule    Erythematous-base heme-crusted tan verrucoid plaque consistent with inflamed seborrheic keratosis     Erythematous Silvery Scaling Plaque c/w Psoriasis     See annotation      Assessment / Plan:        Multiple benign nevi  Reassurance that her nevi appear benign with regular and consistent pigment pattern on dermoscopy    Lentigines  These are benign hyperpigmented sun induced lesions. Daily sun protection will reduce the number of new lesions    Seborrheic keratoses  These are benign inherited growths without a malignant potential. Reassurance given to patient. No treatment is necessary.     Excoriation  Encouraged patient to stop scratching/rubbing as this can exacerbate the condition/scarring/PIPA    Personal history of  malignant melanoma of skin  Area of previous melanoma examined. Site well healed with no signs of recurrence.    Total body skin examination performed today including at least 12 points as noted in physical examination. No lesions suspicious for malignancy noted.    Angioma  This is a benign vascular lesion. Reassurance given. No treatment required.     Varicose Veins on Legs  May be interested in seeing Dr Fenton for varicose veins in the future         Follow up in about 1 year (around 6/20/2020). May be interested in seeing Dr Fenton for varicose veins in the future

## 2019-06-20 NOTE — LETTER
June 20, 2019      Ashlie Adam MD  [address]  [place] [address]           [facility]  [address] [facility] [address] [place] [address]  Phone: [phone]  Fax: [phone]          Patient: Zara Ann   MR Number: [id number]   YOB: 1945   Date of Visit: 6/20/2019       Dear Dr. Ashlie Adam:    Thank you for referring Zara Ann to me for evaluation. Attached you will find relevant portions of my assessment and plan of care.    If you have questions, please do not hesitate to call me. I look forward to following Zara Ann along with you.    Sincerely,    Lissy Roper MD    Enclosure  CC:  No Recipients    If you would like to receive this communication electronically, please contact [email or web address] or [phone] to request more information on [email or web address] Link access.    For providers and/or their staff who would like to refer a patient to [facility], please contact us through our one-stop-shop provider referral line, [facility], at [phone].    If you feel you have received this communication in error or would no longer like to receive these types of communications, please e-mail [email or web address]

## 2019-07-03 ENCOUNTER — OFFICE VISIT (OUTPATIENT)
Dept: ORTHOPEDICS | Facility: CLINIC | Age: 74
End: 2019-07-03
Payer: MEDICARE

## 2019-07-03 VITALS — RESPIRATION RATE: 18 BRPM | WEIGHT: 150 LBS | HEIGHT: 62 IN | BODY MASS INDEX: 27.6 KG/M2

## 2019-07-03 DIAGNOSIS — G56.03 BILATERAL CARPAL TUNNEL SYNDROME: Primary | ICD-10-CM

## 2019-07-03 PROCEDURE — 99999 PR PBB SHADOW E&M-EST. PATIENT-LVL III: ICD-10-PCS | Mod: PBBFAC,,, | Performed by: ORTHOPAEDIC SURGERY

## 2019-07-03 PROCEDURE — 99024 PR POST-OP FOLLOW-UP VISIT: ICD-10-PCS | Mod: S$GLB,,, | Performed by: ORTHOPAEDIC SURGERY

## 2019-07-03 PROCEDURE — 99024 POSTOP FOLLOW-UP VISIT: CPT | Mod: S$GLB,,, | Performed by: ORTHOPAEDIC SURGERY

## 2019-07-03 PROCEDURE — 99999 PR PBB SHADOW E&M-EST. PATIENT-LVL III: CPT | Mod: PBBFAC,,, | Performed by: ORTHOPAEDIC SURGERY

## 2019-07-03 PROCEDURE — 20526 THER INJECTION CARP TUNNEL: CPT | Mod: 58,LT,S$GLB, | Performed by: ORTHOPAEDIC SURGERY

## 2019-07-03 PROCEDURE — 20526 CARPAL TUNNEL: ICD-10-PCS | Mod: 58,LT,S$GLB, | Performed by: ORTHOPAEDIC SURGERY

## 2019-07-03 RX ORDER — PREGABALIN 75 MG/1
75 CAPSULE ORAL 2 TIMES DAILY
Qty: 60 CAPSULE | Refills: 2 | Status: SHIPPED | OUTPATIENT
Start: 2019-07-03 | End: 2020-11-24

## 2019-07-03 RX ORDER — TRIAMCINOLONE ACETONIDE 40 MG/ML
40 INJECTION, SUSPENSION INTRA-ARTICULAR; INTRAMUSCULAR
Status: DISCONTINUED | OUTPATIENT
Start: 2019-07-03 | End: 2019-07-03 | Stop reason: HOSPADM

## 2019-07-03 RX ADMIN — TRIAMCINOLONE ACETONIDE 40 MG: 40 INJECTION, SUSPENSION INTRA-ARTICULAR; INTRAMUSCULAR at 02:07

## 2019-07-03 NOTE — PROCEDURES
Carpal Tunnel  Date/Time: 7/3/2019 2:31 PM  Performed by: Daniel Goode MD  Authorized by: Daniel Goode MD     Consent Done?: Yes (Verbal)  Indications: Pain  Site marked: The procedure site was marked    Timeout: Prior to procedure the correct patient, procedure, and site was verified      Location: L carpal tunnel.  Prep: Patient was prepped and draped in usual sterile fashion    Needle size:  21 G  Approach:  Volar  Medications:  40 mg triamcinolone acetonide 40 mg/mL  Patient tolerance:  Patient tolerated the procedure well with no immediate complications

## 2019-07-03 NOTE — PROGRESS NOTES
Past Medical History:   Diagnosis Date    Cancer     left arm skin    Depression     Diverticulosis     Fatty liver     Genital herpes     GERD (gastroesophageal reflux disease)     Gout     History of use of hearing aid in left ear     Hx of rheumatic fever 4/28/2015    Osteopenia     RAD (reactive airway disease)     Rheumatic fever     UC (ulcerative colitis)        Past Surgical History:   Procedure Laterality Date    APPENDECTOMY      CHOLECYSTECTOMY      COLONOSCOPY  2013    every 2 yrs    DECOMPRESSION AND FUSION, SPINE, CERVICAL, ANTERIOR APPROACH  C5-C7 ANTERIOR CERVICAL DECOMPRESSION AND FUSION N/A 10/22/2018    Performed by [facility] at [facility] OR    HYSTERECTOMY      parital - benign - fibroids    Rectocele  1983    with hysterectomy    RELEASE, CARPAL TUNNEL Left 5/24/2019    Performed by Daniel Goode MD at [facility] OR       Current Outpatient Medications   Medication Sig    alpha lipoic acid 200 mg Cap Take 1 capsule by mouth once daily.    amitriptyline (ELAVIL) 25 MG tablet Take 1 tablet (25 mg total) by mouth nightly as needed for Insomnia.    ascorbic acid (VITAMIN C) 500 MG tablet Take 500 mg by mouth once daily.    aspirin (ECOTRIN) 81 MG EC tablet Take 1 tablet (81 mg total) by mouth every evening.    cyanocobalamin, vitamin B-12, (VITAMIN B-12) 50 mcg tablet Take 50 mcg by mouth once daily.    fish oil-omega-3 fatty acids 300-1,000 mg capsule Take by mouth every evening.     FLAXSEED OIL ORAL Take by mouth once daily.    mesalamine (ASACOL) 800 mg TbEC Take 800 mg by mouth 2 (two) times daily.     milk thistle 175 mg tablet Take 175 mg by mouth 2 (two) times daily.     multivitamin capsule Take 1 capsule by mouth once daily.    sertraline (ZOLOFT) 100 MG tablet TAKE 1 TABLET BY MOUTH TWICE DAILY    TURMERIC ROOT EXTRACT ORAL Take by mouth every evening.     valACYclovir (VALTREX) 1000 MG tablet Take 1 tablet (1,000 mg total) by mouth daily as  needed (outbreak).     No current facility-administered medications for this visit.      Facility-Administered Medications Ordered in Other Visits   Medication    lactated ringers infusion    lidocaine (PF) 10 mg/ml (1%) injection 10 mg    sodium chloride 0.9% flush 3 mL       Review of patient's allergies indicates:   Allergen Reactions    Ciprofloxacin Shortness Of Breath    Clindamycin Shortness Of Breath    Nitrofurantoin monohyd/m-cryst Shortness Of Breath, Anaphylaxis and Itching    Sulfa (sulfonamide antibiotics) Hives    Cephalosporins Hives    Hydrocod-cpm-pe-acetaminophen     Hydrocodone Itching    Hydrocortisone Nausea Only    Sulfamethoxazole-trimethoprim Itching       Family History   Problem Relation Age of Onset    Cancer Mother         colon    Cancer Father         kidney     Ulcers Sister     Pancreatitis Sister     Breast cancer Sister     Liver cancer Paternal Aunt     Allergic rhinitis Neg Hx     Allergies Neg Hx     Angioedema Neg Hx     Asthma Neg Hx     Atopy Neg Hx     Eczema Neg Hx     Rhinitis Neg Hx     Urticaria Neg Hx     Immunodeficiency Neg Hx        Social History     Socioeconomic History    Marital status:      Spouse name: Not on file    Number of children: Not on file    Years of education: Not on file    Highest education level: Not on file   Occupational History    Not on file   Social Needs    Financial resource strain: Not on file    Food insecurity:     Worry: Not on file     Inability: Not on file    Transportation needs:     Medical: Not on file     Non-medical: Not on file   Tobacco Use    Smoking status: Never Smoker    Smokeless tobacco: Never Used   Substance and Sexual Activity    Alcohol use: Yes     Comment: rarely     Drug use: No    Sexual activity: Not on file   Lifestyle    Physical activity:     Days per week: Not on file     Minutes per session: Not on file    Stress: Not on file   Relationships    Social  connections:     Talks on phone: Not on file     Gets together: Not on file     Attends [employer] service: Not on file     Active member of club or organization: Not on file     Attends meetings of clubs or organizations: Not on file     Relationship status: Not on file   Other Topics Concern    Not on file   Social History Narrative    Not on file       Chief Complaint:   Chief Complaint   Patient presents with    Post-op Evaluation     s/p left hand CTR 5/24/19        Date of surgery:  May 24, 2019 left carpal tunnel release    History of present illness:  74-year-old female who is 6 weeks out from left carpal tunnel release.  Patient still complains of numbness and pain in the entire hand now.  Pain in the tips of her fingers.  Profound numbness throughout the hand.      Review of Systems:    Musculoskeletal:  See HPI        Physical Examination:    Vital Signs:    There were no vitals filed for this visit.    Body mass index is 27.44 kg/m².    This a well-developed, well nourished patient in no acute distress.  They are alert and oriented and cooperative to examination.  Pt. walks without an antalgic gait.      Examination left hand shows healed surgical incision. No erythema or drainage. Describes paresthesias in the entire hand.  Good thenar muscular tone.    X-rays:  None     Assessment::  Status post left carpal tunnel Release    Plan:  I recommended trying a cortisone injection for carpal tunnel syndrome.  I also recommended Lyrica to help with her symptoms.  Follow up in 6 weeks.    This note was created using [employer] voice recognition software that occasionally misinterpreted phrases or words.

## 2019-07-10 ENCOUNTER — TELEPHONE (OUTPATIENT)
Dept: NEUROLOGY | Facility: CLINIC | Age: 74
End: 2019-07-10

## 2019-07-11 ENCOUNTER — TELEPHONE (OUTPATIENT)
Dept: NEUROLOGY | Facility: CLINIC | Age: 74
End: 2019-07-11

## 2019-07-12 ENCOUNTER — TELEPHONE (OUTPATIENT)
Dept: NEUROLOGY | Facility: CLINIC | Age: 74
End: 2019-07-12

## 2019-07-12 NOTE — TELEPHONE ENCOUNTER
----- Message from Chantal Ring sent at 7/12/2019  1:19 PM CDT -----  Contact: self  Type:  Sooner Apoointment Request    Caller is requesting a sooner appointment.  Caller declined first available appointment listed below.  Caller will not accept being placed on the waitlist and is requesting a message be sent to doctor.    Name of Caller:  self  When is the first available appointment?  09/04/19  Symptoms:  F/u neuropathy in left hand  Best Call Back Number:  [phone] (home)   Additional Information:  Patient states that she was canceled by the doctor on 08/04/19 and was really hoping to see him sooner that September. She had waited 2 months to get the appointment in August. She really would like to be seen sooner than August. Please call patient. Thanks!

## 2019-07-12 NOTE — TELEPHONE ENCOUNTER
Pt cancelled her appt with Dr. Corado, she needs to see Dr. Barnes for feet issues.  She does not need to be seen for memory issues.

## 2019-07-31 ENCOUNTER — OFFICE VISIT (OUTPATIENT)
Dept: ORTHOPEDICS | Facility: CLINIC | Age: 74
End: 2019-07-31
Payer: MEDICARE

## 2019-07-31 VITALS
DIASTOLIC BLOOD PRESSURE: 59 MMHG | SYSTOLIC BLOOD PRESSURE: 122 MMHG | WEIGHT: 150 LBS | BODY MASS INDEX: 27.6 KG/M2 | HEART RATE: 65 BPM | HEIGHT: 62 IN

## 2019-07-31 DIAGNOSIS — G56.03 BILATERAL CARPAL TUNNEL SYNDROME: Primary | ICD-10-CM

## 2019-07-31 PROCEDURE — 99999 PR PBB SHADOW E&M-EST. PATIENT-LVL III: CPT | Mod: PBBFAC,,, | Performed by: ORTHOPAEDIC SURGERY

## 2019-07-31 PROCEDURE — 99024 POSTOP FOLLOW-UP VISIT: CPT | Mod: S$GLB,,, | Performed by: ORTHOPAEDIC SURGERY

## 2019-07-31 PROCEDURE — 99024 PR POST-OP FOLLOW-UP VISIT: ICD-10-PCS | Mod: S$GLB,,, | Performed by: ORTHOPAEDIC SURGERY

## 2019-07-31 PROCEDURE — 99999 PR PBB SHADOW E&M-EST. PATIENT-LVL III: ICD-10-PCS | Mod: PBBFAC,,, | Performed by: ORTHOPAEDIC SURGERY

## 2019-07-31 NOTE — PROGRESS NOTES
Past Medical History:   Diagnosis Date    Cancer     left arm skin    Depression     Diverticulosis     Fatty liver     Genital herpes     GERD (gastroesophageal reflux disease)     Gout     History of use of hearing aid in left ear     Hx of rheumatic fever 4/28/2015    Osteopenia     RAD (reactive airway disease)     Rheumatic fever     UC (ulcerative colitis)        Past Surgical History:   Procedure Laterality Date    APPENDECTOMY      CHOLECYSTECTOMY      COLONOSCOPY  2013    every 2 yrs    DECOMPRESSION AND FUSION, SPINE, CERVICAL, ANTERIOR APPROACH  C5-C7 ANTERIOR CERVICAL DECOMPRESSION AND FUSION N/A 10/22/2018    Performed by [facility]ri Caro [facility] at [facility] OR    HYSTERECTOMY      parital - benign - fibroids    Rectocele  1983    with hysterectomy    RELEASE, CARPAL TUNNEL Left 5/24/2019    Performed by Daniel Goode MD at [facility] OR       Current Outpatient Medications   Medication Sig    alpha lipoic acid 200 mg Cap Take 1 capsule by mouth once daily.    amitriptyline (ELAVIL) 25 MG tablet Take 1 tablet (25 mg total) by mouth nightly as needed for Insomnia.    ascorbic acid (VITAMIN C) 500 MG tablet Take 500 mg by mouth once daily.    aspirin (ECOTRIN) 81 MG EC tablet Take 1 tablet (81 mg total) by mouth every evening.    cyanocobalamin, vitamin B-12, (VITAMIN B-12) 50 mcg tablet Take 50 mcg by mouth once daily.    fish oil-omega-3 fatty acids 300-1,000 mg capsule Take by mouth every evening.     FLAXSEED OIL ORAL Take by mouth once daily.    mesalamine (ASACOL) 800 mg TbEC Take 800 mg by mouth 2 (two) times daily.     milk thistle 175 mg tablet Take 175 mg by mouth 2 (two) times daily.     multivitamin capsule Take 1 capsule by mouth once daily.    pregabalin (LYRICA) 75 MG capsule Take 1 capsule (75 mg total) by mouth 2 (two) times daily.    sertraline (ZOLOFT) 100 MG tablet TAKE 1 TABLET BY MOUTH TWICE DAILY    TURMERIC ROOT EXTRACT ORAL Take by mouth every  evening.     valACYclovir (VALTREX) 1000 MG tablet Take 1 tablet (1,000 mg total) by mouth daily as needed (outbreak).     No current facility-administered medications for this visit.      Facility-Administered Medications Ordered in Other Visits   Medication    lactated ringers infusion    lidocaine (PF) 10 mg/ml (1%) injection 10 mg    sodium chloride 0.9% flush 3 mL       Review of patient's allergies indicates:   Allergen Reactions    Ciprofloxacin Shortness Of Breath    Clindamycin Shortness Of Breath    Nitrofurantoin monohyd/m-cryst Shortness Of Breath, Anaphylaxis and Itching    Sulfa (sulfonamide antibiotics) Hives    Cephalosporins Hives    Hydrocod-cpm-pe-acetaminophen     Hydrocodone Itching    Hydrocortisone Nausea Only    Sulfamethoxazole-trimethoprim Itching       Family History   Problem Relation Age of Onset    Cancer Mother         colon    Cancer Father         kidney     Ulcers Sister     Pancreatitis Sister     Breast cancer Sister     Liver cancer Paternal Aunt     Allergic rhinitis Neg Hx     Allergies Neg Hx     Angioedema Neg Hx     Asthma Neg Hx     Atopy Neg Hx     Eczema Neg Hx     Rhinitis Neg Hx     Urticaria Neg Hx     Immunodeficiency Neg Hx        Social History     Socioeconomic History    Marital status:      Spouse name: Not on file    Number of children: Not on file    Years of education: Not on file    Highest education level: Not on file   Occupational History    Not on file   Social Needs    Financial resource strain: Not on file    Food insecurity:     Worry: Not on file     Inability: Not on file    Transportation needs:     Medical: Not on file     Non-medical: Not on file   Tobacco Use    Smoking status: Never Smoker    Smokeless tobacco: Never Used   Substance and Sexual Activity    Alcohol use: Yes     Comment: rarely     Drug use: No    Sexual activity: Not on file   Lifestyle    Physical activity:     Days per week: Not  on file     Minutes per session: Not on file    Stress: Not on file   Relationships    Social connections:     Talks on phone: Not on file     Gets together: Not on file     Attends Mandaeism service: Not on file     Active member of club or organization: Not on file     Attends meetings of clubs or organizations: Not on file     Relationship status: Not on file   Other Topics Concern    Not on file   Social History Narrative    Not on file       Chief Complaint:   Chief Complaint   Patient presents with    Post-op Evaluation     s/p left CTR 5/24/19       Date of surgery:  May 24, 2019 left carpal tunnel release    History of present illness:  74-year-old female who is 2 months out from left carpal tunnel release.  Patient still complains of numbness and pain in the entire hand now.  Pain in the tips of her fingers.  Profound numbness throughout the hand.  We tried a cortisone injection and some Lyrica last time. The might have worked a little.  Numbness and tingling isn't present as much.  Still present when she touches things.  Lyrica made her very drowsy.      Review of Systems:    Musculoskeletal:  See HPI        Physical Examination:    Vital Signs:    There were no vitals filed for this visit.    Body mass index is 27.44 kg/m².    This a well-developed, well nourished patient in no acute distress.  They are alert and oriented and cooperative to examination.  Pt. walks without an antalgic gait.      Examination left hand shows healed surgical incision. No erythema or drainage. Describes paresthesias in the entire hand.  Good thenar muscular tone.    X-rays:  None     Assessment::  Status post left carpal tunnel Release    Plan:  She is scheduled to see Dr. Gomez for her cervical stenosis.  I would like to see his opinion on whether this is contributing had all to her hand numbness and tingling.  We also talked about getting her in with the the hand specialist.  Follow up after she sees   Jason.    This note was created using [employer] voice recognition software that occasionally misinterpreted phrases or words.

## 2019-08-05 ENCOUNTER — TELEPHONE (OUTPATIENT)
Dept: FAMILY MEDICINE | Facility: CLINIC | Age: 74
End: 2019-08-05

## 2019-08-05 DIAGNOSIS — R30.0 DYSURIA: Primary | ICD-10-CM

## 2019-08-05 NOTE — TELEPHONE ENCOUNTER
Elder pt  Spoke with pt, she states she is having burning with urination. She is familiar with UTIs and is confident that she has one. She is requesting urine orders. She does not want to wait until Dr. Adam returns tomorrow. She refused appt.

## 2019-08-05 NOTE — TELEPHONE ENCOUNTER
Pt came into clinic for urine sample, while in the lobby she requested her blood pressure be checked. I brought pt into exam room. BP is 110/74. Encouraged pt to go home, rest and drink fluids since she again refused appt to be evaluated. States she has an appt tomorrow (pain mgmt)

## 2019-08-05 NOTE — TELEPHONE ENCOUNTER
----- Message from Livia Krueger sent at 8/5/2019  1:37 PM CDT -----  Contact: Joanne  Type: Needs Medical Advice    Who Called:  patient  Best Call Back Number: [phone]  Additional Information: requesting an order for UA to be put in Epic due to patient experiencing symptoms of UTI--patient would also like to know if stomach viruses are going around as she was vomiting & has now started with diarrhea--please advise--thank you

## 2019-08-06 ENCOUNTER — OFFICE VISIT (OUTPATIENT)
Dept: PAIN MEDICINE | Facility: CLINIC | Age: 74
End: 2019-08-06
Payer: MEDICARE

## 2019-08-06 VITALS
SYSTOLIC BLOOD PRESSURE: 117 MMHG | DIASTOLIC BLOOD PRESSURE: 53 MMHG | RESPIRATION RATE: 18 BRPM | TEMPERATURE: 98 F | BODY MASS INDEX: 27.16 KG/M2 | OXYGEN SATURATION: 98 % | WEIGHT: 148.5 LBS | HEART RATE: 72 BPM

## 2019-08-06 DIAGNOSIS — G56.03 BILATERAL CARPAL TUNNEL SYNDROME: Primary | ICD-10-CM

## 2019-08-06 DIAGNOSIS — M54.12 CERVICAL RADICULOPATHY: ICD-10-CM

## 2019-08-06 DIAGNOSIS — M48.02 CERVICAL STENOSIS OF SPINAL CANAL: ICD-10-CM

## 2019-08-06 PROCEDURE — 3074F SYST BP LT 130 MM HG: CPT | Mod: CPTII,S$GLB,, | Performed by: ANESTHESIOLOGY

## 2019-08-06 PROCEDURE — 99999 PR PBB SHADOW E&M-EST. PATIENT-LVL IV: ICD-10-PCS | Mod: PBBFAC,,, | Performed by: ANESTHESIOLOGY

## 2019-08-06 PROCEDURE — 1101F PT FALLS ASSESS-DOCD LE1/YR: CPT | Mod: CPTII,S$GLB,, | Performed by: ANESTHESIOLOGY

## 2019-08-06 PROCEDURE — 99499 RISK ADDL DX/OHS AUDIT: ICD-10-PCS | Mod: S$GLB,,, | Performed by: ANESTHESIOLOGY

## 2019-08-06 PROCEDURE — 3078F DIAST BP <80 MM HG: CPT | Mod: CPTII,S$GLB,, | Performed by: ANESTHESIOLOGY

## 2019-08-06 PROCEDURE — 99499 UNLISTED E&M SERVICE: CPT | Mod: S$GLB,,, | Performed by: ANESTHESIOLOGY

## 2019-08-06 PROCEDURE — 99999 PR PBB SHADOW E&M-EST. PATIENT-LVL IV: CPT | Mod: PBBFAC,,, | Performed by: ANESTHESIOLOGY

## 2019-08-06 PROCEDURE — 3078F PR MOST RECENT DIASTOLIC BLOOD PRESSURE < 80 MM HG: ICD-10-PCS | Mod: CPTII,S$GLB,, | Performed by: ANESTHESIOLOGY

## 2019-08-06 PROCEDURE — 99213 PR OFFICE/OUTPT VISIT, EST, LEVL III, 20-29 MIN: ICD-10-PCS | Mod: S$GLB,,, | Performed by: ANESTHESIOLOGY

## 2019-08-06 PROCEDURE — 1101F PR PT FALLS ASSESS DOC 0-1 FALLS W/OUT INJ PAST YR: ICD-10-PCS | Mod: CPTII,S$GLB,, | Performed by: ANESTHESIOLOGY

## 2019-08-06 PROCEDURE — 3074F PR MOST RECENT SYSTOLIC BLOOD PRESSURE < 130 MM HG: ICD-10-PCS | Mod: CPTII,S$GLB,, | Performed by: ANESTHESIOLOGY

## 2019-08-06 PROCEDURE — 99213 OFFICE O/P EST LOW 20 MIN: CPT | Mod: S$GLB,,, | Performed by: ANESTHESIOLOGY

## 2019-08-06 NOTE — PROGRESS NOTES
[facility] Follow Up Evaluation    CC:   Chief Complaint   Patient presents with    Follow-up    Numbness    Tingling      Last 3 PDI Scores 8/21/2018   Pain Disability Index (PDI) 0       HPI  The patient is a 74-year-old woman with a history of with a history of GERD, osteopenia, ulcer of colitis who presents in referral from Dr. ann for bilateral hand numbness. I had seen this patient one time several years ago, and during the interim she ended up having ACDF and recovered well from this but continued to have tingling symptoms in her hands.  She was found to have bilateral carpal tunnel syndrome left greater than right.  She is now status post left carpal tunnel release on 05/24/2019 but has continued to have left greater than right hand tingling in all of her fingers.  The patient made this appointment today with me thinking that I was the physician who had done her EMG which she was not able to complete in the past.  She does not recall seeing me for the first time, she has seen my colleague Dr. Sparrow not that long ago who recommended that she repeat the EMG to assess whether her continued left hand tingling was due to carpal tunnel syndrome or if this was due to cervical radiculopathy.  While she does not have any pain, she states that she is bothered by the numbness in her hand and feels that it interferes with doing activities with her hand, dexterity.  She denies any pain in the neck or arms, has some mild tingling in her right hand, this is primarily in her left hand.      Initial HPI:    Patient states that she made this appointment about 2 months ago when she woke up 1 day without any prior trauma or incidents in the recent past, complained of bilateral hand numbness.  She states that her hands went completely numb and she was having difficulty actually using her hands.  She denies any weakness in her hands, denied any pain throughout her arms or neck.  She states that over the next  several days this pain resolved.  During the last month she actually developed about a of shingles and what seems to be a V1 and V2 distribution, she began taking some Gabapentin and this helped, she is no longer taking this, the hand numbness and tingling has not returned, she is not having any weakness in her arms, denies any major balance issues.  She does report that when she extends her neck she does not necessarily have pain but she does get lightheaded.  She reports a history of about 15 years ago having severe neck pain and her left arm went numb and weak.  She states that she was concerned that she may have to have surgery but after 1.5 months of physical therapy, all of her strength returned and she has not had any issues with her neck or arms since then.    Previous Therapies:  PT/OT:   HEP:   Interventions:   Surgery: 1/22/18 - C5-C7 ACDF  Left carpal tunnel release 05/24/2019  Medications:   - NSAIDS:   - MSK Relaxants:   - TCAs:   - SNRIs:   - Topicals:   - Anticonvulsants:  - Opioids:     Current Pain Medications:  1. Gabapentin, amitriptyline      History:    Current Outpatient Medications:     alpha lipoic acid 200 mg Cap, Take 1 capsule by mouth once daily., Disp: , Rfl:     amitriptyline (ELAVIL) 25 MG tablet, Take 1 tablet (25 mg total) by mouth nightly as needed for Insomnia., Disp: 90 tablet, Rfl: 1    ascorbic acid (VITAMIN C) 500 MG tablet, Take 500 mg by mouth once daily., Disp: , Rfl:     aspirin (ECOTRIN) 81 MG EC tablet, Take 1 tablet (81 mg total) by mouth every evening., Disp: , Rfl: 0    cyanocobalamin, vitamin B-12, (VITAMIN B-12) 50 mcg tablet, Take 50 mcg by mouth once daily., Disp: , Rfl:     fish oil-omega-3 fatty acids 300-1,000 mg capsule, Take by mouth every evening. , Disp: , Rfl:     FLAXSEED OIL ORAL, Take by mouth once daily., Disp: , Rfl:     mesalamine (ASACOL) 800 mg TbEC, Take 800 mg by mouth 2 (two) times daily. , Disp: , Rfl:     milk thistle 175 mg tablet,  Take 175 mg by mouth 2 (two) times daily. , Disp: , Rfl:     multivitamin capsule, Take 1 capsule by mouth once daily., Disp: , Rfl:     pregabalin (LYRICA) 75 MG capsule, Take 1 capsule (75 mg total) by mouth 2 (two) times daily., Disp: 60 capsule, Rfl: 2    sertraline (ZOLOFT) 100 MG tablet, TAKE 1 TABLET BY MOUTH TWICE DAILY, Disp: 180 tablet, Rfl: 1    TURMERIC ROOT EXTRACT ORAL, Take by mouth every evening. , Disp: , Rfl:     valACYclovir (VALTREX) 1000 MG tablet, Take 1 tablet (1,000 mg total) by mouth daily as needed (outbreak)., Disp: 10 tablet, Rfl: 0  No current facility-administered medications for this visit.     Facility-Administered Medications Ordered in Other Visits:     lactated ringers infusion, 10 mL/hr, Intravenous, Continuous, Henry Villar MD, Stopped at 05/24/19 1200    lidocaine (PF) 10 mg/ml (1%) injection 10 mg, 1 mL, Intradermal, Once, Henry Villar MD    sodium chloride 0.9% flush 3 mL, 3 mL, Intravenous, Q8H, Henry Villar MD    Past Medical History:   Diagnosis Date    Cancer     left arm skin    Depression     Diverticulosis     Fatty liver     Genital herpes     GERD (gastroesophageal reflux disease)     Gout     History of use of hearing aid in left ear     Hx of rheumatic fever 4/28/2015    Osteopenia     RAD (reactive airway disease)     Rheumatic fever     UC (ulcerative colitis)        Past Surgical History:   Procedure Laterality Date    APPENDECTOMY      CHOLECYSTECTOMY      COLONOSCOPY  2013    every 2 yrs    DECOMPRESSION AND FUSION, SPINE, CERVICAL, ANTERIOR APPROACH  C5-C7 ANTERIOR CERVICAL DECOMPRESSION AND FUSION N/A 10/22/2018    Performed by [facility]ri Caor [facility] at [facility] OR    HYSTERECTOMY      parital - benign - fibroids    Rectocele  1983    with hysterectomy    RELEASE, CARPAL TUNNEL Left 5/24/2019    Performed by Daniel Goode [facility] at [facility] OR       Family History   Problem Relation Age of Onset    Cancer  Mother         colon    Cancer Father         kidney     Ulcers Sister     Pancreatitis Sister     Breast cancer Sister     Liver cancer Paternal Aunt     Allergic rhinitis Neg Hx     Allergies Neg Hx     Angioedema Neg Hx     Asthma Neg Hx     Atopy Neg Hx     Eczema Neg Hx     Rhinitis Neg Hx     Urticaria Neg Hx     Immunodeficiency Neg Hx        Social History     Socioeconomic History    Marital status:      Spouse name: Not on file    Number of children: Not on file    Years of education: Not on file    Highest education level: Not on file   Occupational History    Not on file   Social Needs    Financial resource strain: Not on file    Food insecurity:     Worry: Not on file     Inability: Not on file    Transportation needs:     Medical: Not on file     Non-medical: Not on file   Tobacco Use    Smoking status: Never Smoker    Smokeless tobacco: Never Used   Substance and Sexual Activity    Alcohol use: Yes     Comment: rarely     Drug use: No    Sexual activity: Not on file   Lifestyle    Physical activity:     Days per week: Not on file     Minutes per session: Not on file    Stress: Not on file   Relationships    Social connections:     Talks on phone: Not on file     Gets together: Not on file     Attends Holiness service: Not on file     Active member of club or organization: Not on file     Attends meetings of clubs or organizations: Not on file     Relationship status: Not on file   Other Topics Concern    Not on file   Social History Narrative    Not on file       Review of patient's allergies indicates:   Allergen Reactions    Ciprofloxacin Shortness Of Breath    Clindamycin Shortness Of Breath    Nitrofurantoin monohyd/m-cryst Shortness Of Breath, Anaphylaxis and Itching    Sulfa (sulfonamide antibiotics) Hives    Cephalosporins Hives    Hydrocod-cpm-pe-acetaminophen     Hydrocodone Itching    Hydrocortisone Nausea Only    Sulfamethoxazole-trimethoprim  Itching       Review of Systems:  Review of Systems - General ROS: negative for - fever  Psychological ROS: negative for - hostility  Hematological and Lymphatic ROS: negative for - bleeding problems  Endocrine ROS: negative for - unexpected weight changes  Respiratory ROS: no cough, shortness of breath, or wheezing  Cardiovascular ROS: no chest pain or dyspnea on exertion  Gastrointestinal ROS: no abdominal pain, change in bowel habits, or black or bloody stools  Musculoskeletal ROS: negative for - muscular weakness  Neurological ROS: positive for - numbness/tingling, negative for bowel/bladder changes, no acute balance changes  Dermatological ROS: negative for rash    Physical Exam:  Vitals:    08/06/19 1015   BP: (!) 117/53   Pulse: 72   Resp: 18   Temp: 97.8 °F (36.6 °C)   TempSrc: Oral   SpO2: 98%   Weight: 67.4 kg (148 lb 7.7 oz)   PainSc: 0-No pain     Body mass index is 27.16 kg/m².     Gen: NAD  Psych: mood appropriate for given condition  CV: RRR  HEENT: anicteric   Respiratory: non labored  Abd: soft nt, nd  Skin: intact  Sensation: intact to lt touch bilaterally in c4-t1   Reflexes: 2+ b/l Bicep, tricep, and 2+ left patella and 3+ right patella, Martin negative  ROM: Cervical ROM full, shoulder, elbow and wrist ROM full, no scapular dysmotility   Tone:  Normal at elbow, wrist and shoulder   Inspection: no atrophy of bicep, FDI or APB noted, no scapular winging  Special tests: Cantu's negative bilaterally, Phalen's negative  Palpation: tender cervical paraspinals, levator scapula and trapezius non tender bicipital tendon    Motor:    Right Left   C4 Shoulder Abduction  5  5   C5 Elbow Flexion    5  5   C6 Wrist Extension  5  5   C7 Elbow Extension   5  5   C8/T1 Hand Intrinsics   5-  5-                   Imaging:  Xray cervical spine 10/23/19  FINDINGS  There are postoperative osseous and soft tissue changes present with interval anterior plate and screw fixation as well as disc space material at C5  through C7.  Osseous alignment appears maintained with no interval displaced fracture or dislocation appreciated.  There is mild chronic appearing disc space narrowing at C4-5 similarly.  No other significant interval changes are appreciated.    Cervical MRI 4/6/19  FINDINGS:  Vertebral column: Since the prior MRI, the patient has undergone ACDF of C5 through C7.  Fusion hardware results in mild susceptibility artifact.  The vertebral bodies maintain normal height.  There is marked disc space narrowing at the C4-5 level with mild-to-moderate disc space narrowing at the C3-4 level.  There is also moderate disc space narrowing at the T2-3 level.  This is unchanged.  Baseline marrow signal is normal.    Spinal canal, cord, epidural space: The spinal canal may be borderline small on a developmental basis.  The cord is grossly normal in caliber, contour and signal intensity.  The prior study a suggesting minimal T2 hyperintense signal in the cord at the level of C6-7, likely representing changes of mild edema.  There is no definite cord volume loss/myelomalacia on the current study.  There is no abnormal epidural mass or fluid collection.    Findings by level:    C2-3: There is a minimal 2 mm central disc protrusion.  There is no spinal canal or significant foraminal stenosis.  There is no change.  C3-4: There is disc space narrowing, mild bilateral facet joint arthropathy and minimal uncovertebral spurring.  There is no spinal canal or significant foraminal stenosis.  There is no significant change.  C4-5: There is marked disc space narrowing, left greater than right facet joint arthropathy.  The left facet joints appear fused, this is unchanged and likely developmental.  There is a very shallow broad central and slightly right paracentral disc protrusion/osteophyte which may be slightly more prominent on the current study but there is no spinal canal or significant foraminal stenosis.  C5-6: Status post ACDF.  There  are changes of uncovertebral spurring and right greater than left facet joint arthropathy.  There is a disc osteophyte complex which mildly narrows the ventral subarachnoid space.  There is borderline to mild spinal stenosis with mild to moderate left foraminal stenosis without significant change.  C6-7: Status post ACDF.  There is bilateral uncovertebral spurring.  There is a disc osteophyte complex.  There is narrowing of the subarachnoid space.  There is mild spinal stenosis with moderate-to-marked left foraminal stenosis without significant change.  C7-T1: There is left facet joint arthropathy.  There is disc space narrowing.  There is no spinal canal or significant foraminal stenosis and there is no change.    Redemonstrated is a shallow broad disc protrusion at the T2-3 level but there is no significant spinal canal stenosis.  There is mild foraminal stenosis.    Soft tissues, other: The prevertebral soft tissues are normal.  There is heterogeneous signal intensity within the right lobe of the thyroid gland which could be artifactual.  However, thyroid nodule is not excluded and could be further evaluated with thyroid ultrasound.  Redemonstrated is a partially empty sella.  There is nonspecific T2 and STIR hyperintense signal in the brainstem.  This is nonspecific but may reflect sequelae of chronic small vessel disease in a patient of this age.    Labs:  BMP  Lab Results   Component Value Date     05/08/2019    K 4.1 05/08/2019     05/08/2019    CO2 23 05/08/2019    BUN 21 05/08/2019    CREATININE 0.9 05/08/2019    CALCIUM 9.7 05/08/2019    ANIONGAP 12 05/08/2019    ESTGFRAFRICA >60.0 05/08/2019    EGFRNONAA >60.0 05/08/2019     Lab Results   Component Value Date    ALT 24 10/09/2018    AST 26 10/09/2018    ALKPHOS 84 10/09/2018    BILITOT 1.0 10/09/2018       Assessment:  The patient is a 74-year-old woman with a history of with a history of GERD, osteopenia, ulcer of colitis who presents in  referral from Dr. ann for bilateral hand numbness.    1. Bilateral carpal tunnel syndrome  EMG W/ ULTRASOUND AND NERVE CONDUCTION TEST 2 Extremities    EMG W/ ULTRASOUND AND NERVE CONDUCTION TEST 2 Extremities   2. Cervical radiculopathy  EMG W/ ULTRASOUND AND NERVE CONDUCTION TEST 2 Extremities    EMG W/ ULTRASOUND AND NERVE CONDUCTION TEST 2 Extremities   3. Cervical stenosis of spinal canal  EMG W/ ULTRASOUND AND NERVE CONDUCTION TEST 2 Extremities    EMG W/ ULTRASOUND AND NERVE CONDUCTION TEST 2 Extremities       Plan:  1.  I explained that there are times when carpal tunnel release is done but the patient will continue to have continued neuropathy and also with cervical surgery this can be done and continued to have symptoms.  While she does have some foraminal narrowing especially on the left side at C6/7, I explained that the only way to perhaps determine if this is continued median nerve issues or if this is perhaps coming from her cervical spine or continued foraminal narrowing would be to get in EMG.  She is going to return to the person who had done this in the past, Dr. Barnes.  I suggested that the then make suggestions on treatment options.  If this is coming from her neck, we could do further epidural steroid injections.

## 2019-08-07 ENCOUNTER — TELEPHONE (OUTPATIENT)
Dept: FAMILY MEDICINE | Facility: CLINIC | Age: 74
End: 2019-08-07

## 2019-08-14 ENCOUNTER — PATIENT MESSAGE (OUTPATIENT)
Dept: FAMILY MEDICINE | Facility: CLINIC | Age: 74
End: 2019-08-14

## 2019-08-21 ENCOUNTER — OFFICE VISIT (OUTPATIENT)
Dept: ORTHOPEDICS | Facility: CLINIC | Age: 74
End: 2019-08-21
Payer: MEDICARE

## 2019-08-21 VITALS — HEIGHT: 62 IN | WEIGHT: 148 LBS | BODY MASS INDEX: 27.23 KG/M2 | RESPIRATION RATE: 18 BRPM

## 2019-08-21 DIAGNOSIS — G56.03 BILATERAL CARPAL TUNNEL SYNDROME: Primary | ICD-10-CM

## 2019-08-21 PROCEDURE — 99024 PR POST-OP FOLLOW-UP VISIT: ICD-10-PCS | Mod: S$GLB,,, | Performed by: ORTHOPAEDIC SURGERY

## 2019-08-21 PROCEDURE — 99024 POSTOP FOLLOW-UP VISIT: CPT | Mod: S$GLB,,, | Performed by: ORTHOPAEDIC SURGERY

## 2019-08-21 PROCEDURE — 99999 PR PBB SHADOW E&M-EST. PATIENT-LVL III: ICD-10-PCS | Mod: PBBFAC,,, | Performed by: ORTHOPAEDIC SURGERY

## 2019-08-21 PROCEDURE — 99999 PR PBB SHADOW E&M-EST. PATIENT-LVL III: CPT | Mod: PBBFAC,,, | Performed by: ORTHOPAEDIC SURGERY

## 2019-08-21 NOTE — PROGRESS NOTES
Past Medical History:   Diagnosis Date    Cancer     left arm skin    Depression     Diverticulosis     Fatty liver     Genital herpes     GERD (gastroesophageal reflux disease)     Gout     History of use of hearing aid in left ear     Hx of rheumatic fever 4/28/2015    Osteopenia     RAD (reactive airway disease)     Rheumatic fever     UC (ulcerative colitis)        Past Surgical History:   Procedure Laterality Date    APPENDECTOMY      CHOLECYSTECTOMY      COLONOSCOPY  2013    every 2 yrs    DECOMPRESSION AND FUSION, SPINE, CERVICAL, ANTERIOR APPROACH  C5-C7 ANTERIOR CERVICAL DECOMPRESSION AND FUSION N/A 10/22/2018    Performed by Demetri Caro MD at [facility] OR    HYSTERECTOMY      parital - benign - fibroids    Rectocele  1983    with hysterectomy    RELEASE, CARPAL TUNNEL Left 5/24/2019    Performed by Daniel Goode MD at [facility] OR       Current Outpatient Medications   Medication Sig    alpha lipoic acid 200 mg Cap Take 1 capsule by mouth once daily.    amitriptyline (ELAVIL) 25 MG tablet Take 1 tablet (25 mg total) by mouth nightly as needed for Insomnia.    ascorbic acid (VITAMIN C) 500 MG tablet Take 500 mg by mouth once daily.    aspirin (ECOTRIN) 81 MG EC tablet Take 1 tablet (81 mg total) by mouth every evening.    cyanocobalamin, vitamin B-12, (VITAMIN B-12) 50 mcg tablet Take 50 mcg by mouth once daily.    fish oil-omega-3 fatty acids 300-1,000 mg capsule Take by mouth every evening.     FLAXSEED OIL ORAL Take by mouth once daily.    mesalamine (ASACOL) 800 mg TbEC Take 800 mg by mouth 2 (two) times daily.     milk thistle 175 mg tablet Take 175 mg by mouth 2 (two) times daily.     multivitamin capsule Take 1 capsule by mouth once daily.    pregabalin (LYRICA) 75 MG capsule Take 1 capsule (75 mg total) by mouth 2 (two) times daily.    sertraline (ZOLOFT) 100 MG tablet TAKE 1 TABLET BY MOUTH TWICE DAILY    TURMERIC ROOT EXTRACT ORAL Take by mouth every  evening.     valACYclovir (VALTREX) 1000 MG tablet Take 1 tablet (1,000 mg total) by mouth daily as needed (outbreak).     No current facility-administered medications for this visit.      Facility-Administered Medications Ordered in Other Visits   Medication    lactated ringers infusion    lidocaine (PF) 10 mg/ml (1%) injection 10 mg    sodium chloride 0.9% flush 3 mL       Review of patient's allergies indicates:   Allergen Reactions    Ciprofloxacin Shortness Of Breath    Clindamycin Shortness Of Breath    Nitrofurantoin monohyd/m-cryst Shortness Of Breath, Anaphylaxis and Itching    Sulfa (sulfonamide antibiotics) Hives    Cephalosporins Hives    Hydrocod-cpm-pe-acetaminophen     Hydrocodone Itching    Hydrocortisone Nausea Only    Sulfamethoxazole-trimethoprim Itching       Family History   Problem Relation Age of Onset    Cancer Mother         colon    Cancer Father         kidney     Ulcers Sister     Pancreatitis Sister     Breast cancer Sister     Liver cancer Paternal Aunt     Allergic rhinitis Neg Hx     Allergies Neg Hx     Angioedema Neg Hx     Asthma Neg Hx     Atopy Neg Hx     Eczema Neg Hx     Rhinitis Neg Hx     Urticaria Neg Hx     Immunodeficiency Neg Hx        Social History     Socioeconomic History    Marital status:      Spouse name: Not on file    Number of children: Not on file    Years of education: Not on file    Highest education level: Not on file   Occupational History    Not on file   Social Needs    Financial resource strain: Not on file    Food insecurity:     Worry: Not on file     Inability: Not on file    Transportation needs:     Medical: Not on file     Non-medical: Not on file   Tobacco Use    Smoking status: Never Smoker    Smokeless tobacco: Never Used   Substance and Sexual Activity    Alcohol use: Yes     Comment: rarely     Drug use: No    Sexual activity: Not on file   Lifestyle    Physical activity:     Days per week: Not  on file     Minutes per session: Not on file    Stress: Not on file   Relationships    Social connections:     Talks on phone: Not on file     Gets together: Not on file     Attends Protestant service: Not on file     Active member of club or organization: Not on file     Attends meetings of clubs or organizations: Not on file     Relationship status: Not on file   Other Topics Concern    Not on file   Social History Narrative    Not on file       Chief Complaint:   Chief Complaint   Patient presents with    Wrist Pain     bilateral        Date of surgery:  May 24, 2019 left carpal tunnel release    History of present illness:  74-year-old female who is 2.5 months out from left carpal tunnel release.  Patient still complains of numbness and pain in the entire hand now.  Pain in the tips of her fingers.  Profound numbness throughout the hand.  We tried a cortisone injection and some Lyrica, but the Lyrica made her too drowsy. The might have worked a little.  Numbness and tingling isn't present as much.  Still present when she touches things.  She has an EMG scheduled by Dr. Barnes on September 3rd.  Pain is a 3/10.  She seems to be a little better but still has a lot of the tingling.      Review of Systems:    Musculoskeletal:  See HPI        Physical Examination:    Vital Signs:    Vitals:    08/21/19 1112   Resp: 18       Body mass index is 27.07 kg/m².    This a well-developed, well nourished patient in no acute distress.  They are alert and oriented and cooperative to examination.  Pt. walks without an antalgic gait.      Examination left hand shows healed surgical incision. No erythema or drainage. Describes paresthesias in the entire hand.  Good thenar muscular tone.    X-rays:  None     Assessment::  Status post left carpal tunnel Release    Plan:  We will get the results from Dr. Barnes on her new EMG.  We will call her with the results and that will help us to determine what to do next.    This note was  created using [employer] voice recognition software that occasionally misinterpreted phrases or words.

## 2019-12-27 RX ORDER — SERTRALINE HYDROCHLORIDE 100 MG/1
TABLET, FILM COATED ORAL
Qty: 180 TABLET | Refills: 1 | Status: SHIPPED | OUTPATIENT
Start: 2019-12-27 | End: 2020-07-08

## 2020-05-06 ENCOUNTER — PATIENT MESSAGE (OUTPATIENT)
Dept: ADMINISTRATIVE | Facility: HOSPITAL | Age: 75
End: 2020-05-06

## 2020-07-08 DIAGNOSIS — E87.6 HYPOKALEMIA: ICD-10-CM

## 2020-07-08 DIAGNOSIS — Z79.899 HIGH RISK MEDICATIONS (NOT ANTICOAGULANTS) LONG-TERM USE: ICD-10-CM

## 2020-07-08 DIAGNOSIS — Z00.00 ROUTINE GENERAL MEDICAL EXAMINATION AT A HEALTH CARE FACILITY: Primary | ICD-10-CM

## 2020-07-08 DIAGNOSIS — M10.9 GOUT, UNSPECIFIED CAUSE, UNSPECIFIED CHRONICITY, UNSPECIFIED SITE: ICD-10-CM

## 2020-07-08 RX ORDER — SERTRALINE HYDROCHLORIDE 100 MG/1
TABLET, FILM COATED ORAL
Qty: 180 TABLET | Refills: 0 | Status: SHIPPED | OUTPATIENT
Start: 2020-07-08 | End: 2020-11-12 | Stop reason: SDUPTHER

## 2020-07-08 NOTE — TELEPHONE ENCOUNTER
Spoke with pt and scheduled appt and labs. Pt verbalized understanding.   Orders pended, will send to [facility] when signed.

## 2020-07-09 ENCOUNTER — PATIENT OUTREACH (OUTPATIENT)
Dept: ADMINISTRATIVE | Facility: HOSPITAL | Age: 75
End: 2020-07-09

## 2020-07-09 DIAGNOSIS — Z12.31 ENCOUNTER FOR SCREENING MAMMOGRAM FOR BREAST CANCER: Primary | ICD-10-CM

## 2020-07-09 NOTE — LETTER
July 9, 2020    Zara R Seth  [address] [place] [id number]             [facility]  [address]  [place] [address]  Phone: [phone] Dear Mrs. Ann:    [facility] is committed to your overall health.  To help you get the most out of each of your visits, we will review your information to make sure you are up to date on all of your recommended tests and/or procedures.      Dr. Ashlie Adam MD has found that your chart shows you may be due for     Health Maintenance Due   Topic    Shingles Vaccine (1 of 2)    Mammogram        Future Appointments   Date Time Provider Department Center   7/22/2020  2:20 PM Ashlie Adam MD [facility]     If you have had any of the above done at another facility, please bring the records or information with you so that your record at [facility] will be complete.  If you would like to schedule any of these, please contact me.    If you are currently taking medication, please bring it with you to your appointment for review.    Also, if you have any type of Advanced Directives, please bring them with you to your office visit so we may scan them into your chart.    MD Jeremie Cummings LPN Clinical Care Coordinator  [facility]  [address] [facility] [address]  [place] [id number]  P: [phone]  F: [phone]

## 2020-07-09 NOTE — PROGRESS NOTES
Chart review completed 07/09/2020  Care Everywhere updates requested and reviewed.  Immunizations reconciled. Media reviewed.     Health Maintenance Due   Topic Date Due    Shingles Vaccine (1 of 2) 01/23/1997    Mammogram  06/13/2020       [facility] orders placed

## 2020-07-17 LAB
ALBUMIN SERPL-MCNC: 4.2 G/DL (ref 3.6–5.1)
ALBUMIN/GLOB SERPL: 1.6 (CALC) (ref 1–2.5)
ALP SERPL-CCNC: 86 U/L (ref 37–153)
ALT SERPL-CCNC: 12 U/L (ref 6–29)
AST SERPL-CCNC: 18 U/L (ref 10–35)
BASOPHILS # BLD AUTO: 41 CELLS/UL (ref 0–200)
BASOPHILS NFR BLD AUTO: 1.1 %
BILIRUB SERPL-MCNC: 0.5 MG/DL (ref 0.2–1.2)
BUN SERPL-MCNC: 24 MG/DL (ref 7–25)
BUN/CREAT SERPL: ABNORMAL (CALC) (ref 6–22)
CALCIUM SERPL-MCNC: 9.1 MG/DL (ref 8.6–10.4)
CHLORIDE SERPL-SCNC: 106 MMOL/L (ref 98–110)
CHOLEST SERPL-MCNC: 188 MG/DL
CHOLEST/HDLC SERPL: 3.2 (CALC)
CO2 SERPL-SCNC: 28 MMOL/L (ref 20–32)
CREAT SERPL-MCNC: 0.79 MG/DL (ref 0.6–0.93)
EOSINOPHIL # BLD AUTO: 159 CELLS/UL (ref 15–500)
EOSINOPHIL NFR BLD AUTO: 4.3 %
ERYTHROCYTE [DISTWIDTH] IN BLOOD BY AUTOMATED COUNT: 14.1 % (ref 11–15)
GFRSERPLBLD MDRD-ARVRAT: 74 ML/MIN/1.73M2
GLOBULIN SER CALC-MCNC: 2.6 G/DL (CALC) (ref 1.9–3.7)
GLUCOSE SERPL-MCNC: 101 MG/DL (ref 65–99)
HCT VFR BLD AUTO: 39 % (ref 35–45)
HDLC SERPL-MCNC: 59 MG/DL
HGB BLD-MCNC: 13.2 G/DL (ref 11.7–15.5)
LDLC SERPL CALC-MCNC: 106 MG/DL (CALC)
LYMPHOCYTES # BLD AUTO: 1073 CELLS/UL (ref 850–3900)
LYMPHOCYTES NFR BLD AUTO: 29 %
MCH RBC QN AUTO: 31.7 PG (ref 27–33)
MCHC RBC AUTO-ENTMCNC: 33.8 G/DL (ref 32–36)
MCV RBC AUTO: 93.8 FL (ref 80–100)
MONOCYTES # BLD AUTO: 318 CELLS/UL (ref 200–950)
MONOCYTES NFR BLD AUTO: 8.6 %
NEUTROPHILS # BLD AUTO: 2109 CELLS/UL (ref 1500–7800)
NEUTROPHILS NFR BLD AUTO: 57 %
NONHDLC SERPL-MCNC: 129 MG/DL (CALC)
PLATELET # BLD AUTO: 153 THOUSAND/UL (ref 140–400)
PMV BLD REES-ECKER: 9.1 FL (ref 7.5–12.5)
POTASSIUM SERPL-SCNC: 4 MMOL/L (ref 3.5–5.3)
PROT SERPL-MCNC: 6.8 G/DL (ref 6.1–8.1)
RBC # BLD AUTO: 4.16 MILLION/UL (ref 3.8–5.1)
SODIUM SERPL-SCNC: 142 MMOL/L (ref 135–146)
TRIGL SERPL-MCNC: 134 MG/DL
TSH SERPL-ACNC: 4.19 MIU/L (ref 0.4–4.5)
URATE SERPL-MCNC: 4.7 MG/DL (ref 2.5–7)
WBC # BLD AUTO: 3.7 THOUSAND/UL (ref 3.8–10.8)

## 2020-07-22 ENCOUNTER — OFFICE VISIT (OUTPATIENT)
Dept: FAMILY MEDICINE | Facility: CLINIC | Age: 75
End: 2020-07-22
Payer: MEDICARE

## 2020-07-22 VITALS
SYSTOLIC BLOOD PRESSURE: 132 MMHG | RESPIRATION RATE: 14 BRPM | HEIGHT: 62 IN | HEART RATE: 68 BPM | BODY MASS INDEX: 27.67 KG/M2 | TEMPERATURE: 99 F | WEIGHT: 150.38 LBS | DIASTOLIC BLOOD PRESSURE: 84 MMHG

## 2020-07-22 DIAGNOSIS — F43.0 STRESS REACTION: ICD-10-CM

## 2020-07-22 DIAGNOSIS — H54.7 UNSPECIFIED VISUAL LOSS: ICD-10-CM

## 2020-07-22 DIAGNOSIS — G62.9 PERIPHERAL POLYNEUROPATHY: Primary | ICD-10-CM

## 2020-07-22 DIAGNOSIS — R27.9 UNSPECIFIED LACK OF COORDINATION: ICD-10-CM

## 2020-07-22 DIAGNOSIS — B02.31 HERPES ZOSTER CONJUNCTIVITIS: ICD-10-CM

## 2020-07-22 DIAGNOSIS — R73.9 HYPERGLYCEMIA: ICD-10-CM

## 2020-07-22 PROCEDURE — 1101F PT FALLS ASSESS-DOCD LE1/YR: CPT | Mod: CPTII,S$GLB,, | Performed by: FAMILY MEDICINE

## 2020-07-22 PROCEDURE — 1101F PR PT FALLS ASSESS DOC 0-1 FALLS W/OUT INJ PAST YR: ICD-10-PCS | Mod: CPTII,S$GLB,, | Performed by: FAMILY MEDICINE

## 2020-07-22 PROCEDURE — 3008F BODY MASS INDEX DOCD: CPT | Mod: CPTII,S$GLB,, | Performed by: FAMILY MEDICINE

## 2020-07-22 PROCEDURE — 1159F MED LIST DOCD IN RCRD: CPT | Mod: S$GLB,,, | Performed by: FAMILY MEDICINE

## 2020-07-22 PROCEDURE — 3008F PR BODY MASS INDEX (BMI) DOCUMENTED: ICD-10-PCS | Mod: CPTII,S$GLB,, | Performed by: FAMILY MEDICINE

## 2020-07-22 PROCEDURE — 99499 RISK ADDL DX/OHS AUDIT: ICD-10-PCS | Mod: S$GLB,,, | Performed by: FAMILY MEDICINE

## 2020-07-22 PROCEDURE — 3075F PR MOST RECENT SYSTOLIC BLOOD PRESS GE 130-139MM HG: ICD-10-PCS | Mod: CPTII,S$GLB,, | Performed by: FAMILY MEDICINE

## 2020-07-22 PROCEDURE — 3075F SYST BP GE 130 - 139MM HG: CPT | Mod: CPTII,S$GLB,, | Performed by: FAMILY MEDICINE

## 2020-07-22 PROCEDURE — 99214 PR OFFICE/OUTPT VISIT, EST, LEVL IV, 30-39 MIN: ICD-10-PCS | Mod: S$GLB,,, | Performed by: FAMILY MEDICINE

## 2020-07-22 PROCEDURE — 1126F PR PAIN SEVERITY QUANTIFIED, NO PAIN PRESENT: ICD-10-PCS | Mod: S$GLB,,, | Performed by: FAMILY MEDICINE

## 2020-07-22 PROCEDURE — 99999 PR PBB SHADOW E&M-EST. PATIENT-LVL V: ICD-10-PCS | Mod: PBBFAC,,, | Performed by: FAMILY MEDICINE

## 2020-07-22 PROCEDURE — 3079F PR MOST RECENT DIASTOLIC BLOOD PRESSURE 80-89 MM HG: ICD-10-PCS | Mod: CPTII,S$GLB,, | Performed by: FAMILY MEDICINE

## 2020-07-22 PROCEDURE — 1126F AMNT PAIN NOTED NONE PRSNT: CPT | Mod: S$GLB,,, | Performed by: FAMILY MEDICINE

## 2020-07-22 PROCEDURE — 99499 UNLISTED E&M SERVICE: CPT | Mod: S$GLB,,, | Performed by: FAMILY MEDICINE

## 2020-07-22 PROCEDURE — 1159F PR MEDICATION LIST DOCUMENTED IN MEDICAL RECORD: ICD-10-PCS | Mod: S$GLB,,, | Performed by: FAMILY MEDICINE

## 2020-07-22 PROCEDURE — 3079F DIAST BP 80-89 MM HG: CPT | Mod: CPTII,S$GLB,, | Performed by: FAMILY MEDICINE

## 2020-07-22 PROCEDURE — 99214 OFFICE O/P EST MOD 30 MIN: CPT | Mod: S$GLB,,, | Performed by: FAMILY MEDICINE

## 2020-07-22 PROCEDURE — 99999 PR PBB SHADOW E&M-EST. PATIENT-LVL V: CPT | Mod: PBBFAC,,, | Performed by: FAMILY MEDICINE

## 2020-07-22 RX ORDER — VALACYCLOVIR HYDROCHLORIDE 1 G/1
1000 TABLET, FILM COATED ORAL DAILY PRN
Qty: 10 TABLET | Refills: 0 | Status: CANCELLED | OUTPATIENT
Start: 2020-07-22 | End: 2020-07-29

## 2020-07-22 RX ORDER — VALACYCLOVIR HYDROCHLORIDE 1 G/1
1000 TABLET, FILM COATED ORAL DAILY PRN
Qty: 10 TABLET | Refills: 0 | Status: SHIPPED | OUTPATIENT
Start: 2020-07-22 | End: 2021-04-30

## 2020-07-22 NOTE — PROGRESS NOTES
Subjective:       Patient ID: Zara Ann is a 75 y.o. female.    Chief Complaint: Annual Exam      Zara Ann is in the office for annual exam.    HPI  She notes that since LOV, she had episodes of neuropathy in her hands and feet (nighttime). Had CTS release with ortho/[facility].   Recalls she fell while trying to move her hose, slipped and fell. Later found laceration to L inner thigh. Managed on her own at home (due to covid),healed without issue.   Past Medical History:   Diagnosis Date    Cancer     left arm skin    Depression     Diverticulosis     Fatty liver     Genital herpes     GERD (gastroesophageal reflux disease)     Gout     History of use of hearing aid in left ear     Hx of rheumatic fever 4/28/2015    Osteopenia     RAD (reactive airway disease)     Rheumatic fever     UC (ulcerative colitis)      Has had trouble sleeping. She'll watch tv, read. Tried zzquil which helps her relax enough to sleep. Has trouble falling asleep, usually 11pm, but once asleep, stays in bed until 11am.   Medication averse - trying to manage this on her own.  +muscle cramps at night. Using an otc that seems to help.  Notes ongoing pedal neuropathy. She had started to see neuro, but then had issues with her CTS.  Previous CTS managed surgically.  Admits to loneliness during the stay-at-home due to covid. Wanted a dog, but her  said no. We discussed self-care as well as volunteer opportunities.      Current Outpatient Medications:     alpha lipoic acid 200 mg Cap, Take 1 capsule by mouth once daily., Disp: , Rfl:     ascorbic acid (VITAMIN C) 500 MG tablet, Take 500 mg by mouth once daily., Disp: , Rfl:     cyanocobalamin, vitamin B-12, (VITAMIN B-12) 50 mcg tablet, Take 50 mcg by mouth once daily., Disp: , Rfl:     milk thistle 175 mg tablet, Take 175 mg by mouth 2 (two) times daily. , Disp: , Rfl:     multivitamin capsule, Take 1 capsule by mouth once daily., Disp: , Rfl:      sertraline (ZOLOFT) 100 MG tablet, Take 1 tablet by mouth twice daily, Disp: 180 tablet, Rfl: 0    TURMERIC ROOT EXTRACT ORAL, Take by mouth every evening. , Disp: , Rfl:     valACYclovir (VALTREX) 1000 MG tablet, Take 1 tablet (1,000 mg total) by mouth daily as needed (outbreak)., Disp: 10 tablet, Rfl: 0    amitriptyline (ELAVIL) 25 MG tablet, Take 1 tablet (25 mg total) by mouth nightly as needed for Insomnia., Disp: 90 tablet, Rfl: 1    aspirin (ECOTRIN) 81 MG EC tablet, Take 1 tablet (81 mg total) by mouth every evening., Disp: , Rfl: 0    fish oil-omega-3 fatty acids 300-1,000 mg capsule, Take by mouth every evening. , Disp: , Rfl:     FLAXSEED OIL ORAL, Take by mouth once daily., Disp: , Rfl:     mesalamine (ASACOL) 800 mg TbEC, Take 800 mg by mouth 2 (two) times daily. , Disp: , Rfl:     pregabalin (LYRICA) 75 MG capsule, Take 1 capsule (75 mg total) by mouth 2 (two) times daily., Disp: 60 capsule, Rfl: 2  No current facility-administered medications for this visit.     Facility-Administered Medications Ordered in Other Visits:     lactated ringers infusion, 10 mL/hr, Intravenous, Continuous, Hnery Villar MD, Stopped at 05/24/19 1200    lidocaine (PF) 10 mg/ml (1%) injection 10 mg, 1 mL, Intradermal, Once, Henry Villar MD    sodium chloride 0.9% flush 3 mL, 3 mL, Intravenous, Q8H, Henry Villar MD    The 10-year ASCVD risk score ([place]marianne MEDINA JrMarciano, et al., 2013) is: 13.6%    Values used to calculate the score:      Age: 75 years      Sex: Female      Is Non- : No      Diabetic: No      Tobacco smoker: No      Systolic Blood Pressure: 132 mmHg      Is BP treated: No      HDL Cholesterol: 59 mg/dL      Total Cholesterol: 188 mg/dL     Lab Results   Component Value Date    HGBA1C 4.6 06/13/2018     Lab Results   Component Value Date    LDLCALC 106 (H) 07/16/2020    CREATININE 0.79 07/16/2020   labs 2020 rev.    Review of Systems   Constitutional: Negative for  fatigue, fever and unexpected weight change.   HENT: Negative for congestion, sore throat and trouble swallowing.    Eyes: Positive for visual disturbance (R central vision change).   Respiratory: Negative for cough and shortness of breath.    Cardiovascular: Negative for chest pain and palpitations.   Gastrointestinal: Negative for abdominal pain (some disomfort in ruq, intermittent), blood in stool, constipation and diarrhea.   Genitourinary: Negative for difficulty urinating and dysuria.   Musculoskeletal: Negative for gait problem and joint swelling.   Neurological: Negative for tremors, numbness and headaches (had some initially after bumping her head).   Psychiatric/Behavioral: Positive for dysphoric mood and sleep disturbance.         Objective:      Physical Exam  Vitals signs and nursing note reviewed.   Constitutional:       General: She is not in acute distress.     Appearance: She is well-developed.   HENT:      Head: Normocephalic and atraumatic.   Eyes:      General: No scleral icterus.        Right eye: No discharge.         Left eye: No discharge.      Conjunctiva/sclera: Conjunctivae normal.   Neck:      Musculoskeletal: Normal range of motion and neck supple.   Cardiovascular:      Rate and Rhythm: Normal rate.   Pulmonary:      Effort: Pulmonary effort is normal. No respiratory distress.   Abdominal:      General: There is no distension.      Palpations: Abdomen is soft.   Musculoskeletal: Normal range of motion.   Skin:     General: Skin is warm and dry.      Findings: No rash.   Neurological:      Mental Status: She is alert and oriented to person, place, and time.   Psychiatric:         Mood and Affect: Affect is labile.         Speech: Speech is rapid and pressured (mild).         Behavior: Behavior normal.             Screening recommendations appropriate to age and health status were reviewed.    Assessment & Plan:    Peripheral polyneuropathy  Comments:  feet, persistent; prev started  review with neuro, but had to address cts in hands first  Orders:  -     Ambulatory referral/consult to Neurology; Future; Expected date: 07/29/2020  -     Cancel: Comprehensive metabolic panel; Future; Expected date: 07/22/2020  -     Cancel: Vitamin B12; Future; Expected date: 07/22/2020  -     Cancel: Hemoglobin A1C; Future; Expected date: 07/22/2020  -     Hemoglobin A1C; Future; Expected date: 07/22/2020  -     Vitamin B12; Future; Expected date: 07/22/2020  -     Comprehensive metabolic panel; Future; Expected date: 07/22/2020    Herpes zoster conjunctivitis  -     valACYclovir (VALTREX) 1000 MG tablet; Take 1 tablet (1,000 mg total) by mouth daily as needed (outbreak).  Dispense: 10 tablet; Refill: 0    Hyperglycemia  -     Cancel: Hemoglobin A1C; Future; Expected date: 07/22/2020  -     Hemoglobin A1C; Future; Expected date: 07/22/2020    Unspecified visual loss  Comments:  central field loss for indeterminate time, has appt with [facility] this month to review  Orders:  -     CT Head Without Contrast; Future; Expected date: 07/22/2020    Unspecified lack of coordination  Comments:  noted issue of balance in the last few weeks/months  Orders:  -     Cancel: Vitamin B12; Future; Expected date: 07/22/2020  -     Ambulatory referral/consult to Physical/Occupational Therapy; Future; Expected date: 07/29/2020  -     Vitamin B12; Future; Expected date: 07/22/2020    Stress reaction  Comments:  more personal stressors of late, pt notes she does not want medication, strongly encouraged her to look into counseling  Orders:  -     Ambulatory referral/consult to Psychology; Future; Expected date: 07/29/2020

## 2020-08-28 ENCOUNTER — TELEPHONE (OUTPATIENT)
Dept: FAMILY MEDICINE | Facility: CLINIC | Age: 75
End: 2020-08-28

## 2020-08-28 NOTE — TELEPHONE ENCOUNTER
Have a Carotid Ultrasound order for pt written by doctor with [facility]. Unsure where or why order is at facility for pt and nothing in appointments scheduled. Attempted to contact pt for further details. No answer on home or cell phone. Left message for pt to contact clinic. Order on [facility] desk.     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

## 2020-09-02 ENCOUNTER — TELEPHONE (OUTPATIENT)
Dept: FAMILY MEDICINE | Facility: CLINIC | Age: 75
End: 2020-09-02

## 2020-09-02 DIAGNOSIS — H35.82 RETINAL ISCHEMIA: ICD-10-CM

## 2020-09-02 DIAGNOSIS — H54.7 UNSPECIFIED VISUAL LOSS: Primary | ICD-10-CM

## 2020-09-02 NOTE — TELEPHONE ENCOUNTER
----- Message from Brandon Bowers sent at 9/2/2020 12:07 PM CDT -----  Type: Needs Medical Advice    Who Called:  Patient  Best Call Back Number: [phone]  Additional Information: Patient would like to discuss receiving US orders. Please call to advise. Thanks!

## 2020-09-03 NOTE — TELEPHONE ENCOUNTER
Spoke with pt, she states her retina specialist recommended a carotid US. She states she dropped off the order form last week and has not heard back.

## 2020-09-03 NOTE — TELEPHONE ENCOUNTER
----- Message from Gail Taylor sent at 9/2/2020  5:07 PM CDT -----  Contact: ZARA BLANDON [[id number]]  Type:  Patient Returning Call    Who Called: Zara  Who Left Message for Patient: Tabitha  Does the patient know what this is regarding?: carotid US  Would the patient rather a call back or a response via [facility]?  Call back   Best Call Back Number: [phone]  Additional Information:  none

## 2020-09-22 ENCOUNTER — HOSPITAL ENCOUNTER (OUTPATIENT)
Dept: RADIOLOGY | Facility: HOSPITAL | Age: 75
Discharge: HOME | End: 2020-09-22
Attending: FAMILY MEDICINE
Payer: MEDICARE

## 2020-09-22 DIAGNOSIS — H54.7 UNSPECIFIED VISUAL LOSS: ICD-10-CM

## 2020-09-22 DIAGNOSIS — H35.82 RETINAL ISCHEMIA: ICD-10-CM

## 2020-09-22 PROCEDURE — 93880 US CAROTID BILATERAL: ICD-10-PCS | Mod: 26,,, | Performed by: RADIOLOGY

## 2020-09-22 PROCEDURE — 93880 EXTRACRANIAL BILAT STUDY: CPT | Mod: 26,,, | Performed by: RADIOLOGY

## 2020-09-22 PROCEDURE — 93880 EXTRACRANIAL BILAT STUDY: CPT | Mod: TC,PO

## 2020-10-05 ENCOUNTER — PATIENT MESSAGE (OUTPATIENT)
Dept: ADMINISTRATIVE | Facility: HOSPITAL | Age: 75
End: 2020-10-05

## 2020-10-23 ENCOUNTER — TELEPHONE (OUTPATIENT)
Dept: FAMILY MEDICINE | Facility: CLINIC | Age: 75
End: 2020-10-23

## 2020-10-23 NOTE — TELEPHONE ENCOUNTER
----- Message from Jennifer Rodrigues sent at 10/23/2020 11:42 AM CDT -----  Regarding: Results  Contact: Tania  Type:  Patient Returning Call    Who Called:  Tania from Dr. Rnoi Silva  Does the patient know what this is regarding?:  requesting results of ULT  Best Call Back Number:    Additional Information:  Thank you

## 2020-10-23 NOTE — TELEPHONE ENCOUNTER
Called patient to verify it was ok to send over results. No answer, lvm to call back.   Called Dr. Neri office to fax number, they close at 3 on Fridays.

## 2020-10-26 ENCOUNTER — TELEPHONE (OUTPATIENT)
Dept: PRIMARY CARE CLINIC | Facility: CLINIC | Age: 75
End: 2020-10-26

## 2020-10-26 NOTE — TELEPHONE ENCOUNTER
Spoke with Tania, faxed records as requested to number provided  Also to [phone], back up fax for MD office

## 2020-10-26 NOTE — TELEPHONE ENCOUNTER
----- Message from Kenneth Urbina sent at 10/26/2020  1:31 PM CDT -----  Regarding: advice  Contact: caller  Type:  Test Results    Who Called: Dr Neri office- Tania   Name of Test (Lab/Mammo/Etc):  ultrasound of the carotid artery  Date of Test:  09/22/2020  Ordering Provider:  Dr Adam  Where the test was performed:  [facility]  Best Call Back Number:  [phone]/ fax [phone]   Additional Information:  Dr Neri office requesting pt ultrasound of the carotid artery test results.

## 2020-11-12 RX ORDER — SERTRALINE HYDROCHLORIDE 100 MG/1
TABLET, FILM COATED ORAL
Qty: 180 TABLET | Refills: 0 | Status: SHIPPED | OUTPATIENT
Start: 2020-11-12 | End: 2021-02-17 | Stop reason: SDUPTHER

## 2020-11-12 NOTE — TELEPHONE ENCOUNTER
----- Message from Magdalena Maurer sent at 11/12/2020  3:48 PM CST -----  Contact: [phone]  Caller is requesting an earlier appt than we can schedule.  Caller declined first available appointment listed below. Caller will not accept being placed on the wait list and is requesting a message be sent to the provider.  When is the next available appointment:  12/14/20  Reason for the appointment:  pre op cataract surgery  Patient preference of timeframe to be scheduled:  ASAP  Comments:

## 2020-11-24 ENCOUNTER — OFFICE VISIT (OUTPATIENT)
Dept: FAMILY MEDICINE | Facility: CLINIC | Age: 75
End: 2020-11-24
Payer: MEDICARE

## 2020-11-24 VITALS
DIASTOLIC BLOOD PRESSURE: 74 MMHG | OXYGEN SATURATION: 97 % | RESPIRATION RATE: 18 BRPM | SYSTOLIC BLOOD PRESSURE: 138 MMHG | TEMPERATURE: 99 F | HEIGHT: 62 IN | WEIGHT: 142.88 LBS | HEART RATE: 70 BPM | BODY MASS INDEX: 26.29 KG/M2

## 2020-11-24 DIAGNOSIS — Z01.818 PREOPERATIVE EXAMINATION: Primary | ICD-10-CM

## 2020-11-24 PROCEDURE — 93005 ELECTROCARDIOGRAM TRACING: CPT | Mod: S$GLB,,, | Performed by: INTERNAL MEDICINE

## 2020-11-24 PROCEDURE — 3288F FALL RISK ASSESSMENT DOCD: CPT | Mod: CPTII,S$GLB,, | Performed by: INTERNAL MEDICINE

## 2020-11-24 PROCEDURE — 99214 OFFICE O/P EST MOD 30 MIN: CPT | Mod: S$GLB,,, | Performed by: INTERNAL MEDICINE

## 2020-11-24 PROCEDURE — 93005 EKG 12-LEAD: ICD-10-PCS | Mod: S$GLB,,, | Performed by: INTERNAL MEDICINE

## 2020-11-24 PROCEDURE — 1126F AMNT PAIN NOTED NONE PRSNT: CPT | Mod: S$GLB,,, | Performed by: INTERNAL MEDICINE

## 2020-11-24 PROCEDURE — 99214 PR OFFICE/OUTPT VISIT, EST, LEVL IV, 30-39 MIN: ICD-10-PCS | Mod: S$GLB,,, | Performed by: INTERNAL MEDICINE

## 2020-11-24 PROCEDURE — 99499 RISK ADDL DX/OHS AUDIT: ICD-10-PCS | Mod: S$GLB,,, | Performed by: INTERNAL MEDICINE

## 2020-11-24 PROCEDURE — 1126F PR PAIN SEVERITY QUANTIFIED, NO PAIN PRESENT: ICD-10-PCS | Mod: S$GLB,,, | Performed by: INTERNAL MEDICINE

## 2020-11-24 PROCEDURE — 3008F BODY MASS INDEX DOCD: CPT | Mod: CPTII,S$GLB,, | Performed by: INTERNAL MEDICINE

## 2020-11-24 PROCEDURE — 3008F PR BODY MASS INDEX (BMI) DOCUMENTED: ICD-10-PCS | Mod: CPTII,S$GLB,, | Performed by: INTERNAL MEDICINE

## 2020-11-24 PROCEDURE — 1159F MED LIST DOCD IN RCRD: CPT | Mod: S$GLB,,, | Performed by: INTERNAL MEDICINE

## 2020-11-24 PROCEDURE — 93010 ELECTROCARDIOGRAM REPORT: CPT | Mod: S$GLB,,, | Performed by: INTERNAL MEDICINE

## 2020-11-24 PROCEDURE — 3078F PR MOST RECENT DIASTOLIC BLOOD PRESSURE < 80 MM HG: ICD-10-PCS | Mod: CPTII,S$GLB,, | Performed by: INTERNAL MEDICINE

## 2020-11-24 PROCEDURE — 93010 EKG 12-LEAD: ICD-10-PCS | Mod: S$GLB,,, | Performed by: INTERNAL MEDICINE

## 2020-11-24 PROCEDURE — 99499 UNLISTED E&M SERVICE: CPT | Mod: S$GLB,,, | Performed by: INTERNAL MEDICINE

## 2020-11-24 PROCEDURE — 99999 PR PBB SHADOW E&M-EST. PATIENT-LVL IV: CPT | Mod: PBBFAC,,, | Performed by: INTERNAL MEDICINE

## 2020-11-24 PROCEDURE — 1157F PR ADVANCE CARE PLAN OR EQUIV PRESENT IN MEDICAL RECORD: ICD-10-PCS | Mod: S$GLB,,, | Performed by: INTERNAL MEDICINE

## 2020-11-24 PROCEDURE — 99999 PR PBB SHADOW E&M-EST. PATIENT-LVL IV: ICD-10-PCS | Mod: PBBFAC,,, | Performed by: INTERNAL MEDICINE

## 2020-11-24 PROCEDURE — 1159F PR MEDICATION LIST DOCUMENTED IN MEDICAL RECORD: ICD-10-PCS | Mod: S$GLB,,, | Performed by: INTERNAL MEDICINE

## 2020-11-24 PROCEDURE — 1100F PTFALLS ASSESS-DOCD GE2>/YR: CPT | Mod: CPTII,S$GLB,, | Performed by: INTERNAL MEDICINE

## 2020-11-24 PROCEDURE — 3075F SYST BP GE 130 - 139MM HG: CPT | Mod: CPTII,S$GLB,, | Performed by: INTERNAL MEDICINE

## 2020-11-24 PROCEDURE — 3078F DIAST BP <80 MM HG: CPT | Mod: CPTII,S$GLB,, | Performed by: INTERNAL MEDICINE

## 2020-11-24 PROCEDURE — 3075F PR MOST RECENT SYSTOLIC BLOOD PRESS GE 130-139MM HG: ICD-10-PCS | Mod: CPTII,S$GLB,, | Performed by: INTERNAL MEDICINE

## 2020-11-24 PROCEDURE — 1157F ADVNC CARE PLAN IN RCRD: CPT | Mod: S$GLB,,, | Performed by: INTERNAL MEDICINE

## 2020-11-24 PROCEDURE — 3288F PR FALLS RISK ASSESSMENT DOCUMENTED: ICD-10-PCS | Mod: CPTII,S$GLB,, | Performed by: INTERNAL MEDICINE

## 2020-11-24 PROCEDURE — 1100F PR PT FALLS ASSESS DOC 2+ FALLS/FALL W/INJURY/YR: ICD-10-PCS | Mod: CPTII,S$GLB,, | Performed by: INTERNAL MEDICINE

## 2020-11-24 RX ORDER — INFLUENZA A VIRUS A/MICHIGAN/45/2015 X-275 (H1N1) ANTIGEN (FORMALDEHYDE INACTIVATED), INFLUENZA A VIRUS A/SINGAPORE/INFIMH-16-0019/2016 IVR-186 (H3N2) ANTIGEN (FORMALDEHYDE INACTIVATED), INFLUENZA B VIRUS B/PHUKET/3073/2013 ANTIGEN (FORMALDEHYDE INACTIVATED), AND INFLUENZA B VIRUS B/MARYLAND/15/2016 BX-69A ANTIGEN (FORMALDEHYDE INACTIVATED) 60; 60; 60; 60 UG/.7ML; UG/.7ML; UG/.7ML; UG/.7ML
INJECTION, SUSPENSION INTRAMUSCULAR
COMMUNITY
Start: 2020-10-24 | End: 2021-04-08

## 2020-11-24 NOTE — PROGRESS NOTES
Assessment and Plan:    1. Preoperative examination  - IN OFFICE EKG 12-LEAD (to Muse)    RCRI risk factors include: no known RCRI risk factors. As such, per RCRI the risk of cardiac death, nonfatal myocardial infarction, or nonfatal cardiac arrest is 0.4% and the risk of myocardial infarction, pulmonary edema, ventricular fibrillation, primary cardiac arrest, or complete heart block is 0.5%.  Overall this patient can be considered low risk for this low risk procedure. No further cardiac testing is recommended at this time.     Patient denies any symptoms (as per HPI) concerning for undiagnosed lung disease including ARTI. Would not recommend obtaining chest X-ray, sleep study, or PFTs at this time. Patient is a non-smoker. We discussed the benefits of early mobilization and deep breathing after surgery.      Screened patient for alcohol misuse, use of illicit drugs, and personal or family history of anesthetic complications or bleeding diathesis and no substantial concerns were identified.     All current medications were reviewed and at this time no changes to medications are recommended prior to surgery.     I recommend use of standard pre-op and post-op precautions for this patient. In my opinion, she is medically optimized for this procedure, and can proceed without further evaluation.       ______________________________________________________________________  Subjective:    Chief Complaint:  Preoperative evaluation    HPI:  Zaar is a 75 y.o. year old female here for preoperative evaluation prior to proposed cataract surgery with Dr. Sanabria on 12/8/20.     She has no known personal history of cardiovascular disease (no CAD, PAD, or strokes). She has no history of heart failure, hypertension, diabetes or chronic kidney disease. She denies symptoms of angina, syncope, dyspnea or palpitations. She is able to walk up 2 flights of stairs without chest pain or shortness of breath.     She denies exercise  intolerance, unexplained dyspnea, or cough. She has no known history of obstructive sleep apnea and denies snoring, unusual daytime fatigue, or witnessed apneas.       Medications:  Current Outpatient Medications on File Prior to Visit   Medication Sig Dispense Refill    alpha lipoic acid 200 mg Cap Take 1 capsule by mouth once daily.      ascorbic acid (VITAMIN C) 500 MG tablet Take 500 mg by mouth once daily.      cyanocobalamin, vitamin B-12, (VITAMIN B-12) 50 mcg tablet Take 50 mcg by mouth once daily.      FLUZONE HIGHDOSE QUAD 20-21  mcg/0.7 mL Syrg       milk thistle 175 mg tablet Take 175 mg by mouth 2 (two) times daily.       multivitamin capsule Take 1 capsule by mouth once daily.      sertraline (ZOLOFT) 100 MG tablet Take 1 tablet by mouth twice daily 180 tablet 0    TURMERIC ROOT EXTRACT ORAL Take by mouth every evening.       valACYclovir (VALTREX) 1000 MG tablet Take 1 tablet (1,000 mg total) by mouth daily as needed (outbreak). 10 tablet 0    [DISCONTINUED] pregabalin (LYRICA) 75 MG capsule Take 1 capsule (75 mg total) by mouth 2 (two) times daily. (Patient not taking: Reported on 11/24/2020) 60 capsule 2    [DISCONTINUED] sertraline (ZOLOFT) 100 MG tablet Take 1 tablet by mouth twice daily 180 tablet 0     Current Facility-Administered Medications on File Prior to Visit   Medication Dose Route Frequency Provider Last Rate Last Dose    lactated ringers infusion  10 mL/hr Intravenous Continuous Henry Villar MD   Stopped at 05/24/19 1200    lidocaine (PF) 10 mg/ml (1%) injection 10 mg  1 mL Intradermal Once Henry Villar MD        sodium chloride 0.9% flush 3 mL  3 mL Intravenous Q8H Henry Villar MD           Review of Systems:  Review of Systems   Constitutional: Negative for chills and fever.   Respiratory: Negative for shortness of breath and wheezing.    Cardiovascular: Negative for chest pain and leg swelling.   Gastrointestinal: Negative for diarrhea,  "nausea and vomiting.   Neurological: Negative for dizziness, syncope and light-headedness.       Past Medical History:  Past Medical History:   Diagnosis Date    Cancer     left arm skin    Depression     Diverticulosis     Fatty liver     Genital herpes     GERD (gastroesophageal reflux disease)     Gout     History of use of hearing aid in left ear     Hx of rheumatic fever 4/28/2015    Osteopenia     RAD (reactive airway disease)     Rheumatic fever     UC (ulcerative colitis)        Past Surgical History:  Past Surgical History:   Procedure Laterality Date    APPENDECTOMY      CARPAL TUNNEL RELEASE Left 5/24/2019    Procedure: RELEASE, CARPAL TUNNEL;  Surgeon: Daniel Goode MD;  Location: [facility] OR;  Service: Orthopedics;  Laterality: Left;    CHOLECYSTECTOMY      COLONOSCOPY  2013    every 2 yrs    DECOMPRESSION OF CERVICAL SPINE BY ANTERIOR APPROACH WITH FUSION N/A 10/22/2018    Procedure: DECOMPRESSION AND FUSION, SPINE, CERVICAL, ANTERIOR APPROACH  C5-C7 ANTERIOR CERVICAL DECOMPRESSION AND FUSION;  Surgeon: Demetri Caro MD;  Location: [facility] OR;  Service: Neurosurgery;  Laterality: N/A;    HYSTERECTOMY      parital - benign - fibroids    Rectocele  1983    with hysterectomy         Objective:    Vitals:  Vitals:    11/24/20 1420   BP: 138/74   Pulse: 70   Resp: 18   Temp: 99.1 °F (37.3 °C)   TempSrc: Temporal   SpO2: 97%   Weight: 64.8 kg (142 lb 13.7 oz)   Height: 5' 2" (1.575 m)   PainSc: 0-No pain       Physical Exam  Vitals signs reviewed.   Constitutional:       General: She is not in acute distress.     Appearance: She is well-developed.   Eyes:      General: No scleral icterus.  Cardiovascular:      Rate and Rhythm: Normal rate and regular rhythm.      Heart sounds: No murmur.   Pulmonary:      Effort: Pulmonary effort is normal. No respiratory distress.      Breath sounds: Normal breath sounds. No wheezing, rhonchi or rales.   Musculoskeletal:      Right lower leg: No " edema.      Left lower leg: No edema.   Skin:     General: Skin is warm and dry.   Neurological:      Mental Status: She is alert and oriented to person, place, and time.   Psychiatric:         Behavior: Behavior normal.         Data:  EKG in clinic: NSR, no ST or T wave changes    Cr 0.79 in July    Makenna Guillen MD  Internal Medicine

## 2020-11-24 NOTE — LETTER
November 24, 2020    Zara Ann  [address]  [place] [address]             [facility] [address]  [address]  [place] [address]  Phone: [phone]  Fax: [phone] To whom it may concern,    Ms. Ann was seen in my office on 11/24/20 for preoperative evaluation prior to cataract surgery under [facility] on 12/8/20 with Dr. Sanabria.    I recommend use of standard pre-op and post-op precautions for this patient. In my opinion, she is medically optimized for this procedure, and can proceed without further evaluation.      If you have any questions or concerns, please don't hesitate to call.    Sincerely,        Makenna Guillen MD

## 2021-01-04 ENCOUNTER — PATIENT MESSAGE (OUTPATIENT)
Dept: ADMINISTRATIVE | Facility: HOSPITAL | Age: 76
End: 2021-01-04

## 2021-02-23 ENCOUNTER — TELEPHONE (OUTPATIENT)
Dept: FAMILY MEDICINE | Facility: CLINIC | Age: 76
End: 2021-02-23

## 2021-02-24 ENCOUNTER — TELEPHONE (OUTPATIENT)
Dept: FAMILY MEDICINE | Facility: CLINIC | Age: 76
End: 2021-02-24

## 2021-04-06 ENCOUNTER — LAB VISIT (OUTPATIENT)
Dept: LAB | Facility: HOSPITAL | Age: 76
End: 2021-04-06
Attending: FAMILY MEDICINE
Payer: MEDICARE

## 2021-04-06 ENCOUNTER — IMMUNIZATION (OUTPATIENT)
Dept: FAMILY MEDICINE | Facility: CLINIC | Age: 76
End: 2021-04-06
Payer: MEDICARE

## 2021-04-06 ENCOUNTER — OFFICE VISIT (OUTPATIENT)
Dept: FAMILY MEDICINE | Facility: CLINIC | Age: 76
End: 2021-04-06
Payer: MEDICARE

## 2021-04-06 ENCOUNTER — PATIENT MESSAGE (OUTPATIENT)
Dept: ADMINISTRATIVE | Facility: HOSPITAL | Age: 76
End: 2021-04-06

## 2021-04-06 VITALS
BODY MASS INDEX: 27.06 KG/M2 | RESPIRATION RATE: 14 BRPM | HEART RATE: 76 BPM | HEIGHT: 62 IN | SYSTOLIC BLOOD PRESSURE: 122 MMHG | TEMPERATURE: 99 F | DIASTOLIC BLOOD PRESSURE: 74 MMHG | WEIGHT: 147.06 LBS

## 2021-04-06 DIAGNOSIS — G62.9 PERIPHERAL POLYNEUROPATHY: ICD-10-CM

## 2021-04-06 DIAGNOSIS — E53.8 B12 DEFICIENCY: ICD-10-CM

## 2021-04-06 DIAGNOSIS — R73.9 HYPERGLYCEMIA: ICD-10-CM

## 2021-04-06 DIAGNOSIS — E78.5 HYPERLIPIDEMIA, UNSPECIFIED HYPERLIPIDEMIA TYPE: ICD-10-CM

## 2021-04-06 DIAGNOSIS — Z01.818 PREOP EXAMINATION: Primary | ICD-10-CM

## 2021-04-06 DIAGNOSIS — Z23 NEED FOR VACCINATION: Primary | ICD-10-CM

## 2021-04-06 LAB
BASOPHILS # BLD AUTO: 0.04 K/UL (ref 0–0.2)
BASOPHILS NFR BLD: 0.8 % (ref 0–1.9)
DIFFERENTIAL METHOD: ABNORMAL
EOSINOPHIL # BLD AUTO: 0.2 K/UL (ref 0–0.5)
EOSINOPHIL NFR BLD: 3.8 % (ref 0–8)
ERYTHROCYTE [DISTWIDTH] IN BLOOD BY AUTOMATED COUNT: 14.3 % (ref 11.5–14.5)
HCT VFR BLD AUTO: 41.3 % (ref 37–48.5)
HGB BLD-MCNC: 13.6 G/DL (ref 12–16)
IMM GRANULOCYTES # BLD AUTO: 0.02 K/UL (ref 0–0.04)
IMM GRANULOCYTES NFR BLD AUTO: 0.4 % (ref 0–0.5)
LYMPHOCYTES # BLD AUTO: 1 K/UL (ref 1–4.8)
LYMPHOCYTES NFR BLD: 20.5 % (ref 18–48)
MCH RBC QN AUTO: 30.8 PG (ref 27–31)
MCHC RBC AUTO-ENTMCNC: 32.9 G/DL (ref 32–36)
MCV RBC AUTO: 94 FL (ref 82–98)
MONOCYTES # BLD AUTO: 0.4 K/UL (ref 0.3–1)
MONOCYTES NFR BLD: 8.4 % (ref 4–15)
NEUTROPHILS # BLD AUTO: 3.2 K/UL (ref 1.8–7.7)
NEUTROPHILS NFR BLD: 66.1 % (ref 38–73)
NRBC BLD-RTO: 0 /100 WBC
PLATELET # BLD AUTO: 143 K/UL (ref 150–450)
PMV BLD AUTO: 9.4 FL (ref 9.2–12.9)
RBC # BLD AUTO: 4.41 M/UL (ref 4–5.4)
WBC # BLD AUTO: 4.79 K/UL (ref 3.9–12.7)

## 2021-04-06 PROCEDURE — 1100F PR PT FALLS ASSESS DOC 2+ FALLS/FALL W/INJURY/YR: ICD-10-PCS | Mod: CPTII,S$GLB,, | Performed by: FAMILY MEDICINE

## 2021-04-06 PROCEDURE — 82607 VITAMIN B-12: CPT | Performed by: FAMILY MEDICINE

## 2021-04-06 PROCEDURE — 1159F MED LIST DOCD IN RCRD: CPT | Mod: S$GLB,,, | Performed by: FAMILY MEDICINE

## 2021-04-06 PROCEDURE — 91300 COVID-19, MRNA, LNP-S, PF, 30 MCG/0.3 ML DOSE VACCINE: ICD-10-PCS | Mod: S$GLB,,, | Performed by: FAMILY MEDICINE

## 2021-04-06 PROCEDURE — 36415 COLL VENOUS BLD VENIPUNCTURE: CPT | Mod: PO | Performed by: FAMILY MEDICINE

## 2021-04-06 PROCEDURE — 99214 OFFICE O/P EST MOD 30 MIN: CPT | Mod: S$GLB,,, | Performed by: FAMILY MEDICINE

## 2021-04-06 PROCEDURE — 99499 UNLISTED E&M SERVICE: CPT | Mod: S$GLB,,, | Performed by: FAMILY MEDICINE

## 2021-04-06 PROCEDURE — 0001A COVID-19, MRNA, LNP-S, PF, 30 MCG/0.3 ML DOSE VACCINE: CPT | Mod: CV19,S$GLB,, | Performed by: FAMILY MEDICINE

## 2021-04-06 PROCEDURE — 85025 COMPLETE CBC W/AUTO DIFF WBC: CPT | Performed by: FAMILY MEDICINE

## 2021-04-06 PROCEDURE — 1126F AMNT PAIN NOTED NONE PRSNT: CPT | Mod: S$GLB,,, | Performed by: FAMILY MEDICINE

## 2021-04-06 PROCEDURE — 3008F BODY MASS INDEX DOCD: CPT | Mod: CPTII,S$GLB,, | Performed by: FAMILY MEDICINE

## 2021-04-06 PROCEDURE — 1157F ADVNC CARE PLAN IN RCRD: CPT | Mod: S$GLB,,, | Performed by: FAMILY MEDICINE

## 2021-04-06 PROCEDURE — 1157F PR ADVANCE CARE PLAN OR EQUIV PRESENT IN MEDICAL RECORD: ICD-10-PCS | Mod: S$GLB,,, | Performed by: FAMILY MEDICINE

## 2021-04-06 PROCEDURE — 99499 RISK ADDL DX/OHS AUDIT: ICD-10-PCS | Mod: S$GLB,,, | Performed by: FAMILY MEDICINE

## 2021-04-06 PROCEDURE — 3008F PR BODY MASS INDEX (BMI) DOCUMENTED: ICD-10-PCS | Mod: CPTII,S$GLB,, | Performed by: FAMILY MEDICINE

## 2021-04-06 PROCEDURE — 80053 COMPREHEN METABOLIC PANEL: CPT | Performed by: FAMILY MEDICINE

## 2021-04-06 PROCEDURE — 1159F PR MEDICATION LIST DOCUMENTED IN MEDICAL RECORD: ICD-10-PCS | Mod: S$GLB,,, | Performed by: FAMILY MEDICINE

## 2021-04-06 PROCEDURE — 3288F FALL RISK ASSESSMENT DOCD: CPT | Mod: CPTII,S$GLB,, | Performed by: FAMILY MEDICINE

## 2021-04-06 PROCEDURE — 1126F PR PAIN SEVERITY QUANTIFIED, NO PAIN PRESENT: ICD-10-PCS | Mod: S$GLB,,, | Performed by: FAMILY MEDICINE

## 2021-04-06 PROCEDURE — 80061 LIPID PANEL: CPT | Performed by: FAMILY MEDICINE

## 2021-04-06 PROCEDURE — 84550 ASSAY OF BLOOD/URIC ACID: CPT | Performed by: FAMILY MEDICINE

## 2021-04-06 PROCEDURE — 84443 ASSAY THYROID STIM HORMONE: CPT | Performed by: FAMILY MEDICINE

## 2021-04-06 PROCEDURE — 99999 PR PBB SHADOW E&M-EST. PATIENT-LVL IV: CPT | Mod: PBBFAC,,, | Performed by: FAMILY MEDICINE

## 2021-04-06 PROCEDURE — 91300 COVID-19, MRNA, LNP-S, PF, 30 MCG/0.3 ML DOSE VACCINE: CPT | Mod: S$GLB,,, | Performed by: FAMILY MEDICINE

## 2021-04-06 PROCEDURE — 1100F PTFALLS ASSESS-DOCD GE2>/YR: CPT | Mod: CPTII,S$GLB,, | Performed by: FAMILY MEDICINE

## 2021-04-06 PROCEDURE — 3288F PR FALLS RISK ASSESSMENT DOCUMENTED: ICD-10-PCS | Mod: CPTII,S$GLB,, | Performed by: FAMILY MEDICINE

## 2021-04-06 PROCEDURE — 99999 PR PBB SHADOW E&M-EST. PATIENT-LVL IV: ICD-10-PCS | Mod: PBBFAC,,, | Performed by: FAMILY MEDICINE

## 2021-04-06 PROCEDURE — 0001A COVID-19, MRNA, LNP-S, PF, 30 MCG/0.3 ML DOSE VACCINE: ICD-10-PCS | Mod: CV19,S$GLB,, | Performed by: FAMILY MEDICINE

## 2021-04-06 PROCEDURE — 99214 PR OFFICE/OUTPT VISIT, EST, LEVL IV, 30-39 MIN: ICD-10-PCS | Mod: S$GLB,,, | Performed by: FAMILY MEDICINE

## 2021-04-06 PROCEDURE — 83036 HEMOGLOBIN GLYCOSYLATED A1C: CPT | Performed by: FAMILY MEDICINE

## 2021-04-07 LAB
ALBUMIN SERPL BCP-MCNC: 4.1 G/DL (ref 3.5–5.2)
ALP SERPL-CCNC: 96 U/L (ref 55–135)
ALT SERPL W/O P-5'-P-CCNC: 22 U/L (ref 10–44)
ANION GAP SERPL CALC-SCNC: 8 MMOL/L (ref 8–16)
AST SERPL-CCNC: 30 U/L (ref 10–40)
BILIRUB SERPL-MCNC: 0.6 MG/DL (ref 0.1–1)
BUN SERPL-MCNC: 18 MG/DL (ref 8–23)
CALCIUM SERPL-MCNC: 9.4 MG/DL (ref 8.7–10.5)
CHLORIDE SERPL-SCNC: 106 MMOL/L (ref 95–110)
CHOLEST SERPL-MCNC: 196 MG/DL (ref 120–199)
CHOLEST/HDLC SERPL: 3 {RATIO} (ref 2–5)
CO2 SERPL-SCNC: 29 MMOL/L (ref 23–29)
CREAT SERPL-MCNC: 0.8 MG/DL (ref 0.5–1.4)
EST. GFR  (AFRICAN AMERICAN): >60 ML/MIN/1.73 M^2
EST. GFR  (NON AFRICAN AMERICAN): >60 ML/MIN/1.73 M^2
ESTIMATED AVG GLUCOSE: 85 MG/DL (ref 68–131)
GLUCOSE SERPL-MCNC: 84 MG/DL (ref 70–110)
HBA1C MFR BLD: 4.6 % (ref 4–5.6)
HDLC SERPL-MCNC: 65 MG/DL (ref 40–75)
HDLC SERPL: 33.2 % (ref 20–50)
LDLC SERPL CALC-MCNC: 103.6 MG/DL (ref 63–159)
NONHDLC SERPL-MCNC: 131 MG/DL
POTASSIUM SERPL-SCNC: 4.8 MMOL/L (ref 3.5–5.1)
PROT SERPL-MCNC: 7.4 G/DL (ref 6–8.4)
SODIUM SERPL-SCNC: 143 MMOL/L (ref 136–145)
TRIGL SERPL-MCNC: 137 MG/DL (ref 30–150)
TSH SERPL DL<=0.005 MIU/L-ACNC: 1.52 UIU/ML (ref 0.4–4)
URATE SERPL-MCNC: 4.4 MG/DL (ref 2.4–5.7)
VIT B12 SERPL-MCNC: 1097 PG/ML (ref 210–950)

## 2021-04-20 ENCOUNTER — OFFICE VISIT (OUTPATIENT)
Dept: FAMILY MEDICINE | Facility: CLINIC | Age: 76
End: 2021-04-20
Payer: MEDICARE

## 2021-04-20 VITALS
TEMPERATURE: 100 F | WEIGHT: 147.38 LBS | SYSTOLIC BLOOD PRESSURE: 110 MMHG | HEART RATE: 75 BPM | HEIGHT: 62 IN | OXYGEN SATURATION: 96 % | DIASTOLIC BLOOD PRESSURE: 70 MMHG | BODY MASS INDEX: 27.12 KG/M2

## 2021-04-20 DIAGNOSIS — S01.01XD LACERATION OF SCALP WITHOUT FOREIGN BODY, SUBSEQUENT ENCOUNTER: Primary | ICD-10-CM

## 2021-04-20 PROCEDURE — 99999 PR PBB SHADOW E&M-EST. PATIENT-LVL IV: ICD-10-PCS | Mod: PBBFAC,,, | Performed by: FAMILY MEDICINE

## 2021-04-20 PROCEDURE — 1159F MED LIST DOCD IN RCRD: CPT | Mod: S$GLB,,, | Performed by: FAMILY MEDICINE

## 2021-04-20 PROCEDURE — 1157F ADVNC CARE PLAN IN RCRD: CPT | Mod: S$GLB,,, | Performed by: FAMILY MEDICINE

## 2021-04-20 PROCEDURE — 3288F PR FALLS RISK ASSESSMENT DOCUMENTED: ICD-10-PCS | Mod: CPTII,S$GLB,, | Performed by: FAMILY MEDICINE

## 2021-04-20 PROCEDURE — 1100F PR PT FALLS ASSESS DOC 2+ FALLS/FALL W/INJURY/YR: ICD-10-PCS | Mod: CPTII,S$GLB,, | Performed by: FAMILY MEDICINE

## 2021-04-20 PROCEDURE — 3008F BODY MASS INDEX DOCD: CPT | Mod: CPTII,S$GLB,, | Performed by: FAMILY MEDICINE

## 2021-04-20 PROCEDURE — 99213 PR OFFICE/OUTPT VISIT, EST, LEVL III, 20-29 MIN: ICD-10-PCS | Mod: S$GLB,,, | Performed by: FAMILY MEDICINE

## 2021-04-20 PROCEDURE — 3008F PR BODY MASS INDEX (BMI) DOCUMENTED: ICD-10-PCS | Mod: CPTII,S$GLB,, | Performed by: FAMILY MEDICINE

## 2021-04-20 PROCEDURE — 1100F PTFALLS ASSESS-DOCD GE2>/YR: CPT | Mod: CPTII,S$GLB,, | Performed by: FAMILY MEDICINE

## 2021-04-20 PROCEDURE — 99213 OFFICE O/P EST LOW 20 MIN: CPT | Mod: S$GLB,,, | Performed by: FAMILY MEDICINE

## 2021-04-20 PROCEDURE — 3288F FALL RISK ASSESSMENT DOCD: CPT | Mod: CPTII,S$GLB,, | Performed by: FAMILY MEDICINE

## 2021-04-20 PROCEDURE — 1157F PR ADVANCE CARE PLAN OR EQUIV PRESENT IN MEDICAL RECORD: ICD-10-PCS | Mod: S$GLB,,, | Performed by: FAMILY MEDICINE

## 2021-04-20 PROCEDURE — 1159F PR MEDICATION LIST DOCUMENTED IN MEDICAL RECORD: ICD-10-PCS | Mod: S$GLB,,, | Performed by: FAMILY MEDICINE

## 2021-04-20 PROCEDURE — 99999 PR PBB SHADOW E&M-EST. PATIENT-LVL IV: CPT | Mod: PBBFAC,,, | Performed by: FAMILY MEDICINE

## 2021-04-27 ENCOUNTER — OFFICE VISIT (OUTPATIENT)
Dept: FAMILY MEDICINE | Facility: CLINIC | Age: 76
End: 2021-04-27
Payer: MEDICARE

## 2021-04-27 ENCOUNTER — IMMUNIZATION (OUTPATIENT)
Dept: FAMILY MEDICINE | Facility: CLINIC | Age: 76
End: 2021-04-27
Payer: MEDICARE

## 2021-04-27 VITALS
BODY MASS INDEX: 26.85 KG/M2 | HEART RATE: 70 BPM | SYSTOLIC BLOOD PRESSURE: 116 MMHG | WEIGHT: 146.81 LBS | DIASTOLIC BLOOD PRESSURE: 72 MMHG | OXYGEN SATURATION: 97 %

## 2021-04-27 DIAGNOSIS — Z23 NEED FOR VACCINATION: Primary | ICD-10-CM

## 2021-04-27 DIAGNOSIS — S01.01XA LACERATION OF SCALP, INITIAL ENCOUNTER: Primary | ICD-10-CM

## 2021-04-27 PROCEDURE — 1159F PR MEDICATION LIST DOCUMENTED IN MEDICAL RECORD: ICD-10-PCS | Mod: S$GLB,,, | Performed by: PHYSICIAN ASSISTANT

## 2021-04-27 PROCEDURE — 99212 OFFICE O/P EST SF 10 MIN: CPT | Mod: S$GLB,,, | Performed by: PHYSICIAN ASSISTANT

## 2021-04-27 PROCEDURE — 1157F PR ADVANCE CARE PLAN OR EQUIV PRESENT IN MEDICAL RECORD: ICD-10-PCS | Mod: S$GLB,,, | Performed by: PHYSICIAN ASSISTANT

## 2021-04-27 PROCEDURE — 91300 COVID-19, MRNA, LNP-S, PF, 30 MCG/0.3 ML DOSE VACCINE: CPT | Mod: PBBFAC | Performed by: FAMILY MEDICINE

## 2021-04-27 PROCEDURE — 99024 POSTOP FOLLOW-UP VISIT: CPT | Mod: S$GLB,,, | Performed by: PHYSICIAN ASSISTANT

## 2021-04-27 PROCEDURE — 3008F PR BODY MASS INDEX (BMI) DOCUMENTED: ICD-10-PCS | Mod: CPTII,S$GLB,, | Performed by: PHYSICIAN ASSISTANT

## 2021-04-27 PROCEDURE — 99999 PR PBB SHADOW E&M-EST. PATIENT-LVL III: ICD-10-PCS | Mod: PBBFAC,,, | Performed by: PHYSICIAN ASSISTANT

## 2021-04-27 PROCEDURE — 99999 PR PBB SHADOW E&M-EST. PATIENT-LVL III: CPT | Mod: PBBFAC,,, | Performed by: PHYSICIAN ASSISTANT

## 2021-04-27 PROCEDURE — 1159F MED LIST DOCD IN RCRD: CPT | Mod: S$GLB,,, | Performed by: PHYSICIAN ASSISTANT

## 2021-04-27 PROCEDURE — 1157F ADVNC CARE PLAN IN RCRD: CPT | Mod: S$GLB,,, | Performed by: PHYSICIAN ASSISTANT

## 2021-04-27 PROCEDURE — 3008F BODY MASS INDEX DOCD: CPT | Mod: CPTII,S$GLB,, | Performed by: PHYSICIAN ASSISTANT

## 2021-04-27 PROCEDURE — 99024 SUTURE REMOVAL: ICD-10-PCS | Mod: S$GLB,,, | Performed by: PHYSICIAN ASSISTANT

## 2021-04-27 PROCEDURE — 0002A COVID-19, MRNA, LNP-S, PF, 30 MCG/0.3 ML DOSE VACCINE: CPT | Mod: PBBFAC | Performed by: FAMILY MEDICINE

## 2021-04-27 PROCEDURE — 99212 PR OFFICE/OUTPT VISIT, EST, LEVL II, 10-19 MIN: ICD-10-PCS | Mod: S$GLB,,, | Performed by: PHYSICIAN ASSISTANT

## 2021-04-30 ENCOUNTER — TELEPHONE (OUTPATIENT)
Dept: FAMILY MEDICINE | Facility: CLINIC | Age: 76
End: 2021-04-30

## 2021-04-30 DIAGNOSIS — B02.31 HERPES ZOSTER CONJUNCTIVITIS: ICD-10-CM

## 2021-04-30 RX ORDER — ACYCLOVIR 50 MG/G
OINTMENT TOPICAL
Qty: 15 G | Refills: 0 | Status: SHIPPED | OUTPATIENT
Start: 2021-04-30 | End: 2021-06-15

## 2021-04-30 RX ORDER — VALACYCLOVIR HYDROCHLORIDE 1 G/1
TABLET, FILM COATED ORAL
Qty: 10 TABLET | Refills: 1 | Status: SHIPPED | OUTPATIENT
Start: 2021-04-30 | End: 2023-12-18

## 2021-05-24 RX ORDER — SERTRALINE HYDROCHLORIDE 100 MG/1
TABLET, FILM COATED ORAL
Qty: 180 TABLET | Refills: 1 | Status: SHIPPED | OUTPATIENT
Start: 2021-05-24 | End: 2021-12-09

## 2021-05-25 ENCOUNTER — HOSPITAL ENCOUNTER (OUTPATIENT)
Dept: RADIOLOGY | Facility: HOSPITAL | Age: 76
Discharge: HOME | End: 2021-05-25
Attending: FAMILY MEDICINE
Payer: MEDICARE

## 2021-05-25 DIAGNOSIS — Z12.31 ENCOUNTER FOR SCREENING MAMMOGRAM FOR BREAST CANCER: ICD-10-CM

## 2021-05-25 PROCEDURE — 77067 SCR MAMMO BI INCL CAD: CPT | Mod: 26,,, | Performed by: RADIOLOGY

## 2021-05-25 PROCEDURE — 77063 BREAST TOMOSYNTHESIS BI: CPT | Mod: 26,,, | Performed by: RADIOLOGY

## 2021-05-25 PROCEDURE — 77067 MAMMO DIGITAL SCREENING BILAT WITH TOMO: ICD-10-PCS | Mod: 26,,, | Performed by: RADIOLOGY

## 2021-05-25 PROCEDURE — 77063 MAMMO DIGITAL SCREENING BILAT WITH TOMO: ICD-10-PCS | Mod: 26,,, | Performed by: RADIOLOGY

## 2021-05-25 PROCEDURE — 77067 SCR MAMMO BI INCL CAD: CPT | Mod: TC,PO

## 2021-06-09 ENCOUNTER — PATIENT OUTREACH (OUTPATIENT)
Dept: ADMINISTRATIVE | Facility: OTHER | Age: 76
End: 2021-06-09

## 2021-06-10 ENCOUNTER — OFFICE VISIT (OUTPATIENT)
Dept: NEUROLOGY | Facility: CLINIC | Age: 76
End: 2021-06-10
Payer: MEDICARE

## 2021-06-10 VITALS
BODY MASS INDEX: 26.88 KG/M2 | DIASTOLIC BLOOD PRESSURE: 74 MMHG | HEIGHT: 62 IN | RESPIRATION RATE: 18 BRPM | WEIGHT: 146.06 LBS | SYSTOLIC BLOOD PRESSURE: 127 MMHG | HEART RATE: 73 BPM

## 2021-06-10 DIAGNOSIS — R20.2 PARESTHESIA: Primary | ICD-10-CM

## 2021-06-10 DIAGNOSIS — G62.9 PERIPHERAL POLYNEUROPATHY: ICD-10-CM

## 2021-06-10 PROCEDURE — 1159F PR MEDICATION LIST DOCUMENTED IN MEDICAL RECORD: ICD-10-PCS | Mod: S$GLB,,, | Performed by: PSYCHIATRY & NEUROLOGY

## 2021-06-10 PROCEDURE — 3288F PR FALLS RISK ASSESSMENT DOCUMENTED: ICD-10-PCS | Mod: CPTII,S$GLB,, | Performed by: PSYCHIATRY & NEUROLOGY

## 2021-06-10 PROCEDURE — 1100F PR PT FALLS ASSESS DOC 2+ FALLS/FALL W/INJURY/YR: ICD-10-PCS | Mod: CPTII,S$GLB,, | Performed by: PSYCHIATRY & NEUROLOGY

## 2021-06-10 PROCEDURE — 1157F ADVNC CARE PLAN IN RCRD: CPT | Mod: S$GLB,,, | Performed by: PSYCHIATRY & NEUROLOGY

## 2021-06-10 PROCEDURE — 1159F MED LIST DOCD IN RCRD: CPT | Mod: S$GLB,,, | Performed by: PSYCHIATRY & NEUROLOGY

## 2021-06-10 PROCEDURE — 3008F BODY MASS INDEX DOCD: CPT | Mod: CPTII,S$GLB,, | Performed by: PSYCHIATRY & NEUROLOGY

## 2021-06-10 PROCEDURE — 99999 PR PBB SHADOW E&M-EST. PATIENT-LVL IV: CPT | Mod: PBBFAC,,, | Performed by: PSYCHIATRY & NEUROLOGY

## 2021-06-10 PROCEDURE — 1100F PTFALLS ASSESS-DOCD GE2>/YR: CPT | Mod: CPTII,S$GLB,, | Performed by: PSYCHIATRY & NEUROLOGY

## 2021-06-10 PROCEDURE — 1126F AMNT PAIN NOTED NONE PRSNT: CPT | Mod: S$GLB,,, | Performed by: PSYCHIATRY & NEUROLOGY

## 2021-06-10 PROCEDURE — 3288F FALL RISK ASSESSMENT DOCD: CPT | Mod: CPTII,S$GLB,, | Performed by: PSYCHIATRY & NEUROLOGY

## 2021-06-10 PROCEDURE — 99999 PR PBB SHADOW E&M-EST. PATIENT-LVL IV: ICD-10-PCS | Mod: PBBFAC,,, | Performed by: PSYCHIATRY & NEUROLOGY

## 2021-06-10 PROCEDURE — 3008F PR BODY MASS INDEX (BMI) DOCUMENTED: ICD-10-PCS | Mod: CPTII,S$GLB,, | Performed by: PSYCHIATRY & NEUROLOGY

## 2021-06-10 PROCEDURE — 99215 OFFICE O/P EST HI 40 MIN: CPT | Mod: S$GLB,,, | Performed by: PSYCHIATRY & NEUROLOGY

## 2021-06-10 PROCEDURE — 99215 PR OFFICE/OUTPT VISIT, EST, LEVL V, 40-54 MIN: ICD-10-PCS | Mod: S$GLB,,, | Performed by: PSYCHIATRY & NEUROLOGY

## 2021-06-10 PROCEDURE — 1126F PR PAIN SEVERITY QUANTIFIED, NO PAIN PRESENT: ICD-10-PCS | Mod: S$GLB,,, | Performed by: PSYCHIATRY & NEUROLOGY

## 2021-06-10 PROCEDURE — 1157F PR ADVANCE CARE PLAN OR EQUIV PRESENT IN MEDICAL RECORD: ICD-10-PCS | Mod: S$GLB,,, | Performed by: PSYCHIATRY & NEUROLOGY

## 2021-06-14 ENCOUNTER — NURSE TRIAGE (OUTPATIENT)
Dept: ADMINISTRATIVE | Facility: CLINIC | Age: 76
End: 2021-06-14

## 2021-06-14 ENCOUNTER — TELEPHONE (OUTPATIENT)
Dept: FAMILY MEDICINE | Facility: CLINIC | Age: 76
End: 2021-06-14

## 2021-06-15 RX ORDER — ACYCLOVIR 50 MG/G
OINTMENT TOPICAL
Qty: 15 G | Refills: 1 | Status: SHIPPED | OUTPATIENT
Start: 2021-06-15 | End: 2024-02-09 | Stop reason: SDUPTHER

## 2021-06-16 ENCOUNTER — LAB VISIT (OUTPATIENT)
Dept: LAB | Facility: HOSPITAL | Age: 76
End: 2021-06-16
Attending: PSYCHIATRY & NEUROLOGY
Payer: MEDICARE

## 2021-06-16 DIAGNOSIS — R20.2 PARESTHESIA: ICD-10-CM

## 2021-06-16 PROCEDURE — 84165 PATHOLOGIST INTERPRETATION SPE: ICD-10-PCS | Mod: 26,,, | Performed by: PATHOLOGY

## 2021-06-16 PROCEDURE — 84165 PROTEIN E-PHORESIS SERUM: CPT | Mod: 26,,, | Performed by: PATHOLOGY

## 2021-06-16 PROCEDURE — 36415 COLL VENOUS BLD VENIPUNCTURE: CPT | Mod: PN | Performed by: PSYCHIATRY & NEUROLOGY

## 2021-06-16 PROCEDURE — 84425 ASSAY OF VITAMIN B-1: CPT | Performed by: PSYCHIATRY & NEUROLOGY

## 2021-06-16 PROCEDURE — 82746 ASSAY OF FOLIC ACID SERUM: CPT | Performed by: PSYCHIATRY & NEUROLOGY

## 2021-06-16 PROCEDURE — 84207 ASSAY OF VITAMIN B-6: CPT | Performed by: PSYCHIATRY & NEUROLOGY

## 2021-06-16 PROCEDURE — 83090 ASSAY OF HOMOCYSTEINE: CPT | Performed by: PSYCHIATRY & NEUROLOGY

## 2021-06-16 PROCEDURE — 84165 PROTEIN E-PHORESIS SERUM: CPT | Performed by: PSYCHIATRY & NEUROLOGY

## 2021-06-16 PROCEDURE — 86592 SYPHILIS TEST NON-TREP QUAL: CPT | Performed by: PSYCHIATRY & NEUROLOGY

## 2021-06-17 LAB
ALBUMIN SERPL ELPH-MCNC: 4.24 G/DL (ref 3.35–5.55)
ALPHA1 GLOB SERPL ELPH-MCNC: 0.26 G/DL (ref 0.17–0.41)
ALPHA2 GLOB SERPL ELPH-MCNC: 0.62 G/DL (ref 0.43–0.99)
B-GLOBULIN SERPL ELPH-MCNC: 0.78 G/DL (ref 0.5–1.1)
FOLATE SERPL-MCNC: 16.2 NG/ML (ref 4–24)
GAMMA GLOB SERPL ELPH-MCNC: 1.11 G/DL (ref 0.67–1.58)
HCYS SERPL-SCNC: 9.1 UMOL/L (ref 4–15.5)
PATHOLOGIST INTERPRETATION SPE: NORMAL
PROT SERPL-MCNC: 7 G/DL (ref 6–8.4)
RPR SER QL: NORMAL

## 2021-06-22 LAB
PYRIDOXAL SERPL-MCNC: 67 UG/L (ref 5–50)
VIT B1 BLD-MCNC: 101 UG/L (ref 38–122)

## 2021-06-24 ENCOUNTER — TELEPHONE (OUTPATIENT)
Dept: NEUROLOGY | Facility: CLINIC | Age: 76
End: 2021-06-24

## 2021-07-06 ENCOUNTER — OFFICE VISIT (OUTPATIENT)
Dept: FAMILY MEDICINE | Facility: CLINIC | Age: 76
End: 2021-07-06
Payer: MEDICARE

## 2021-07-06 VITALS
TEMPERATURE: 98 F | SYSTOLIC BLOOD PRESSURE: 126 MMHG | OXYGEN SATURATION: 97 % | BODY MASS INDEX: 26.96 KG/M2 | DIASTOLIC BLOOD PRESSURE: 74 MMHG | HEART RATE: 73 BPM | WEIGHT: 146.5 LBS | HEIGHT: 62 IN

## 2021-07-06 DIAGNOSIS — M46.99 UNSPECIFIED INFLAMMATORY SPONDYLOPATHY, MULTIPLE SITES IN SPINE: ICD-10-CM

## 2021-07-06 DIAGNOSIS — G95.9 CERVICAL MYELOPATHY: ICD-10-CM

## 2021-07-06 DIAGNOSIS — F33.1 MAJOR DEPRESSIVE DISORDER, RECURRENT, MODERATE: ICD-10-CM

## 2021-07-06 DIAGNOSIS — E23.6 OTHER DISORDERS OF PITUITARY GLAND: ICD-10-CM

## 2021-07-06 DIAGNOSIS — R26.89 BALANCE DISORDER: Primary | ICD-10-CM

## 2021-07-06 DIAGNOSIS — K51.90 ULCERATIVE COLITIS WITHOUT COMPLICATIONS, UNSPECIFIED LOCATION: ICD-10-CM

## 2021-07-06 DIAGNOSIS — Z78.0 POSTMENOPAUSAL STATE: ICD-10-CM

## 2021-07-06 PROCEDURE — 99214 OFFICE O/P EST MOD 30 MIN: CPT | Mod: S$GLB,,, | Performed by: FAMILY MEDICINE

## 2021-07-06 PROCEDURE — 99499 RISK ADDL DX/OHS AUDIT: ICD-10-PCS | Mod: S$GLB,,, | Performed by: FAMILY MEDICINE

## 2021-07-06 PROCEDURE — 1159F PR MEDICATION LIST DOCUMENTED IN MEDICAL RECORD: ICD-10-PCS | Mod: S$GLB,,, | Performed by: FAMILY MEDICINE

## 2021-07-06 PROCEDURE — 99999 PR PBB SHADOW E&M-EST. PATIENT-LVL V: ICD-10-PCS | Mod: PBBFAC,,, | Performed by: FAMILY MEDICINE

## 2021-07-06 PROCEDURE — 1100F PTFALLS ASSESS-DOCD GE2>/YR: CPT | Mod: CPTII,S$GLB,, | Performed by: FAMILY MEDICINE

## 2021-07-06 PROCEDURE — 1126F AMNT PAIN NOTED NONE PRSNT: CPT | Mod: S$GLB,,, | Performed by: FAMILY MEDICINE

## 2021-07-06 PROCEDURE — 1126F PR PAIN SEVERITY QUANTIFIED, NO PAIN PRESENT: ICD-10-PCS | Mod: S$GLB,,, | Performed by: FAMILY MEDICINE

## 2021-07-06 PROCEDURE — 3288F FALL RISK ASSESSMENT DOCD: CPT | Mod: CPTII,S$GLB,, | Performed by: FAMILY MEDICINE

## 2021-07-06 PROCEDURE — 99214 PR OFFICE/OUTPT VISIT, EST, LEVL IV, 30-39 MIN: ICD-10-PCS | Mod: S$GLB,,, | Performed by: FAMILY MEDICINE

## 2021-07-06 PROCEDURE — 1159F MED LIST DOCD IN RCRD: CPT | Mod: S$GLB,,, | Performed by: FAMILY MEDICINE

## 2021-07-06 PROCEDURE — 3288F PR FALLS RISK ASSESSMENT DOCUMENTED: ICD-10-PCS | Mod: CPTII,S$GLB,, | Performed by: FAMILY MEDICINE

## 2021-07-06 PROCEDURE — 99499 UNLISTED E&M SERVICE: CPT | Mod: S$GLB,,, | Performed by: FAMILY MEDICINE

## 2021-07-06 PROCEDURE — 1100F PR PT FALLS ASSESS DOC 2+ FALLS/FALL W/INJURY/YR: ICD-10-PCS | Mod: CPTII,S$GLB,, | Performed by: FAMILY MEDICINE

## 2021-07-06 PROCEDURE — 3008F PR BODY MASS INDEX (BMI) DOCUMENTED: ICD-10-PCS | Mod: CPTII,S$GLB,, | Performed by: FAMILY MEDICINE

## 2021-07-06 PROCEDURE — 99999 PR PBB SHADOW E&M-EST. PATIENT-LVL V: CPT | Mod: PBBFAC,,, | Performed by: FAMILY MEDICINE

## 2021-07-06 PROCEDURE — 3008F BODY MASS INDEX DOCD: CPT | Mod: CPTII,S$GLB,, | Performed by: FAMILY MEDICINE

## 2021-07-06 PROCEDURE — 1157F ADVNC CARE PLAN IN RCRD: CPT | Mod: S$GLB,,, | Performed by: FAMILY MEDICINE

## 2021-07-06 PROCEDURE — 1157F PR ADVANCE CARE PLAN OR EQUIV PRESENT IN MEDICAL RECORD: ICD-10-PCS | Mod: S$GLB,,, | Performed by: FAMILY MEDICINE

## 2021-07-16 ENCOUNTER — PROCEDURE VISIT (OUTPATIENT)
Dept: NEUROLOGY | Facility: CLINIC | Age: 76
End: 2021-07-16
Payer: MEDICARE

## 2021-07-16 DIAGNOSIS — R20.2 PARESTHESIA: ICD-10-CM

## 2021-07-16 DIAGNOSIS — M54.17 LUMBOSACRAL RADICULOPATHY: Primary | ICD-10-CM

## 2021-07-16 DIAGNOSIS — G60.9 IDIOPATHIC PERIPHERAL NEUROPATHY: ICD-10-CM

## 2021-07-16 PROCEDURE — 95911 NRV CNDJ TEST 9-10 STUDIES: CPT | Mod: S$GLB,,, | Performed by: PSYCHIATRY & NEUROLOGY

## 2021-07-16 PROCEDURE — 95911 PR NERVE CONDUCTION STUDY; 9-10 STUDIES: ICD-10-PCS | Mod: S$GLB,,, | Performed by: PSYCHIATRY & NEUROLOGY

## 2021-07-16 PROCEDURE — 95886 MUSC TEST DONE W/N TEST COMP: CPT | Mod: S$GLB,,, | Performed by: PSYCHIATRY & NEUROLOGY

## 2021-07-16 PROCEDURE — 95886 PR EMG COMPLETE, W/ NERVE CONDUCTION STUDIES, 5+ MUSCLES: ICD-10-PCS | Mod: S$GLB,,, | Performed by: PSYCHIATRY & NEUROLOGY

## 2021-08-04 ENCOUNTER — CLINICAL SUPPORT (OUTPATIENT)
Dept: REHABILITATION | Facility: HOSPITAL | Age: 76
End: 2021-08-04
Attending: FAMILY MEDICINE
Payer: MEDICARE

## 2021-08-04 DIAGNOSIS — R26.89 BALANCE DISORDER: ICD-10-CM

## 2021-08-04 DIAGNOSIS — R26.81 GAIT INSTABILITY: ICD-10-CM

## 2021-08-04 DIAGNOSIS — M62.81 MUSCLE WEAKNESS: Primary | ICD-10-CM

## 2021-08-04 PROCEDURE — 97162 PT EVAL MOD COMPLEX 30 MIN: CPT | Mod: PN | Performed by: PHYSICAL THERAPIST

## 2021-08-04 PROCEDURE — 97110 THERAPEUTIC EXERCISES: CPT | Mod: PN | Performed by: PHYSICAL THERAPIST

## 2021-08-09 ENCOUNTER — PATIENT OUTREACH (OUTPATIENT)
Dept: ADMINISTRATIVE | Facility: OTHER | Age: 76
End: 2021-08-09

## 2021-08-10 ENCOUNTER — OFFICE VISIT (OUTPATIENT)
Dept: NEUROLOGY | Facility: CLINIC | Age: 76
End: 2021-08-10
Payer: MEDICARE

## 2021-08-10 VITALS
BODY MASS INDEX: 27.14 KG/M2 | WEIGHT: 147.5 LBS | TEMPERATURE: 97 F | SYSTOLIC BLOOD PRESSURE: 144 MMHG | DIASTOLIC BLOOD PRESSURE: 78 MMHG | HEIGHT: 62 IN | HEART RATE: 72 BPM

## 2021-08-10 DIAGNOSIS — G60.9 IDIOPATHIC PERIPHERAL NEUROPATHY: ICD-10-CM

## 2021-08-10 PROBLEM — G62.9 PERIPHERAL NEUROPATHY: Status: ACTIVE | Noted: 2021-08-10

## 2021-08-10 PROCEDURE — 1159F PR MEDICATION LIST DOCUMENTED IN MEDICAL RECORD: ICD-10-PCS | Mod: CPTII,S$GLB,, | Performed by: NURSE PRACTITIONER

## 2021-08-10 PROCEDURE — 99999 PR PBB SHADOW E&M-EST. PATIENT-LVL IV: CPT | Mod: PBBFAC,,, | Performed by: NURSE PRACTITIONER

## 2021-08-10 PROCEDURE — 3044F HG A1C LEVEL LT 7.0%: CPT | Mod: CPTII,S$GLB,, | Performed by: NURSE PRACTITIONER

## 2021-08-10 PROCEDURE — 1126F AMNT PAIN NOTED NONE PRSNT: CPT | Mod: CPTII,S$GLB,, | Performed by: NURSE PRACTITIONER

## 2021-08-10 PROCEDURE — 1159F MED LIST DOCD IN RCRD: CPT | Mod: CPTII,S$GLB,, | Performed by: NURSE PRACTITIONER

## 2021-08-10 PROCEDURE — 1157F ADVNC CARE PLAN IN RCRD: CPT | Mod: CPTII,S$GLB,, | Performed by: NURSE PRACTITIONER

## 2021-08-10 PROCEDURE — 1126F PR PAIN SEVERITY QUANTIFIED, NO PAIN PRESENT: ICD-10-PCS | Mod: CPTII,S$GLB,, | Performed by: NURSE PRACTITIONER

## 2021-08-10 PROCEDURE — 3008F BODY MASS INDEX DOCD: CPT | Mod: CPTII,S$GLB,, | Performed by: NURSE PRACTITIONER

## 2021-08-10 PROCEDURE — 3077F PR MOST RECENT SYSTOLIC BLOOD PRESSURE >= 140 MM HG: ICD-10-PCS | Mod: CPTII,S$GLB,, | Performed by: NURSE PRACTITIONER

## 2021-08-10 PROCEDURE — 3288F PR FALLS RISK ASSESSMENT DOCUMENTED: ICD-10-PCS | Mod: CPTII,S$GLB,, | Performed by: NURSE PRACTITIONER

## 2021-08-10 PROCEDURE — 3288F FALL RISK ASSESSMENT DOCD: CPT | Mod: CPTII,S$GLB,, | Performed by: NURSE PRACTITIONER

## 2021-08-10 PROCEDURE — 1101F PT FALLS ASSESS-DOCD LE1/YR: CPT | Mod: CPTII,S$GLB,, | Performed by: NURSE PRACTITIONER

## 2021-08-10 PROCEDURE — 99215 PR OFFICE/OUTPT VISIT, EST, LEVL V, 40-54 MIN: ICD-10-PCS | Mod: S$GLB,,, | Performed by: NURSE PRACTITIONER

## 2021-08-10 PROCEDURE — 99215 OFFICE O/P EST HI 40 MIN: CPT | Mod: S$GLB,,, | Performed by: NURSE PRACTITIONER

## 2021-08-10 PROCEDURE — 1101F PR PT FALLS ASSESS DOC 0-1 FALLS W/OUT INJ PAST YR: ICD-10-PCS | Mod: CPTII,S$GLB,, | Performed by: NURSE PRACTITIONER

## 2021-08-10 PROCEDURE — 3044F PR MOST RECENT HEMOGLOBIN A1C LEVEL <7.0%: ICD-10-PCS | Mod: CPTII,S$GLB,, | Performed by: NURSE PRACTITIONER

## 2021-08-10 PROCEDURE — 1160F RVW MEDS BY RX/DR IN RCRD: CPT | Mod: CPTII,S$GLB,, | Performed by: NURSE PRACTITIONER

## 2021-08-10 PROCEDURE — 1157F PR ADVANCE CARE PLAN OR EQUIV PRESENT IN MEDICAL RECORD: ICD-10-PCS | Mod: CPTII,S$GLB,, | Performed by: NURSE PRACTITIONER

## 2021-08-10 PROCEDURE — 1160F PR REVIEW ALL MEDS BY PRESCRIBER/CLIN PHARMACIST DOCUMENTED: ICD-10-PCS | Mod: CPTII,S$GLB,, | Performed by: NURSE PRACTITIONER

## 2021-08-10 PROCEDURE — 3077F SYST BP >= 140 MM HG: CPT | Mod: CPTII,S$GLB,, | Performed by: NURSE PRACTITIONER

## 2021-08-10 PROCEDURE — 3008F PR BODY MASS INDEX (BMI) DOCUMENTED: ICD-10-PCS | Mod: CPTII,S$GLB,, | Performed by: NURSE PRACTITIONER

## 2021-08-10 PROCEDURE — 99999 PR PBB SHADOW E&M-EST. PATIENT-LVL IV: ICD-10-PCS | Mod: PBBFAC,,, | Performed by: NURSE PRACTITIONER

## 2021-08-10 PROCEDURE — 3078F DIAST BP <80 MM HG: CPT | Mod: CPTII,S$GLB,, | Performed by: NURSE PRACTITIONER

## 2021-08-10 PROCEDURE — 3078F PR MOST RECENT DIASTOLIC BLOOD PRESSURE < 80 MM HG: ICD-10-PCS | Mod: CPTII,S$GLB,, | Performed by: NURSE PRACTITIONER

## 2021-08-13 ENCOUNTER — CLINICAL SUPPORT (OUTPATIENT)
Dept: REHABILITATION | Facility: HOSPITAL | Age: 76
End: 2021-08-13
Payer: MEDICARE

## 2021-08-13 DIAGNOSIS — R26.81 GAIT INSTABILITY: ICD-10-CM

## 2021-08-13 DIAGNOSIS — G60.9 IDIOPATHIC PERIPHERAL NEUROPATHY: Primary | ICD-10-CM

## 2021-08-13 DIAGNOSIS — M85.80 OSTEOPENIA, UNSPECIFIED LOCATION: ICD-10-CM

## 2021-08-13 DIAGNOSIS — M62.81 MUSCLE WEAKNESS: ICD-10-CM

## 2021-08-13 PROCEDURE — 97530 THERAPEUTIC ACTIVITIES: CPT | Mod: PN

## 2021-08-13 PROCEDURE — 97110 THERAPEUTIC EXERCISES: CPT | Mod: PN

## 2021-08-13 PROCEDURE — 97112 NEUROMUSCULAR REEDUCATION: CPT | Mod: PN

## 2021-08-17 ENCOUNTER — CLINICAL SUPPORT (OUTPATIENT)
Dept: REHABILITATION | Facility: HOSPITAL | Age: 76
End: 2021-08-17
Payer: MEDICARE

## 2021-08-17 DIAGNOSIS — R26.81 GAIT INSTABILITY: ICD-10-CM

## 2021-08-17 DIAGNOSIS — M62.81 MUSCLE WEAKNESS: Primary | ICD-10-CM

## 2021-08-17 PROCEDURE — 97112 NEUROMUSCULAR REEDUCATION: CPT | Mod: PN | Performed by: PHYSICAL THERAPIST

## 2021-08-17 PROCEDURE — 97110 THERAPEUTIC EXERCISES: CPT | Mod: PN | Performed by: PHYSICAL THERAPIST

## 2021-08-20 ENCOUNTER — CLINICAL SUPPORT (OUTPATIENT)
Dept: REHABILITATION | Facility: HOSPITAL | Age: 76
End: 2021-08-20
Payer: MEDICARE

## 2021-08-20 DIAGNOSIS — M62.81 MUSCLE WEAKNESS: ICD-10-CM

## 2021-08-20 DIAGNOSIS — R26.81 GAIT INSTABILITY: ICD-10-CM

## 2021-08-20 PROCEDURE — 97112 NEUROMUSCULAR REEDUCATION: CPT | Mod: PN,CQ

## 2021-08-25 ENCOUNTER — CLINICAL SUPPORT (OUTPATIENT)
Dept: REHABILITATION | Facility: HOSPITAL | Age: 76
End: 2021-08-25
Payer: MEDICARE

## 2021-08-25 DIAGNOSIS — M62.81 MUSCLE WEAKNESS: ICD-10-CM

## 2021-08-25 DIAGNOSIS — R26.81 GAIT INSTABILITY: ICD-10-CM

## 2021-08-25 PROCEDURE — 97110 THERAPEUTIC EXERCISES: CPT | Mod: PN,CQ

## 2021-08-25 PROCEDURE — 97112 NEUROMUSCULAR REEDUCATION: CPT | Mod: PN,CQ

## 2021-08-27 ENCOUNTER — CLINICAL SUPPORT (OUTPATIENT)
Dept: REHABILITATION | Facility: HOSPITAL | Age: 76
End: 2021-08-27
Payer: MEDICARE

## 2021-08-27 DIAGNOSIS — M62.81 MUSCLE WEAKNESS: Primary | ICD-10-CM

## 2021-08-27 DIAGNOSIS — R26.81 GAIT INSTABILITY: ICD-10-CM

## 2021-08-27 PROCEDURE — 97110 THERAPEUTIC EXERCISES: CPT | Mod: PN | Performed by: PHYSICAL THERAPIST

## 2021-08-27 PROCEDURE — 97112 NEUROMUSCULAR REEDUCATION: CPT | Mod: PN | Performed by: PHYSICAL THERAPIST

## 2021-09-08 ENCOUNTER — DOCUMENTATION ONLY (OUTPATIENT)
Dept: REHABILITATION | Facility: HOSPITAL | Age: 76
End: 2021-09-08

## 2021-09-08 DIAGNOSIS — R26.81 GAIT INSTABILITY: ICD-10-CM

## 2021-09-08 DIAGNOSIS — M62.81 MUSCLE WEAKNESS: Primary | ICD-10-CM

## 2021-10-27 ENCOUNTER — PES CALL (OUTPATIENT)
Dept: ADMINISTRATIVE | Facility: CLINIC | Age: 76
End: 2021-10-27
Payer: MEDICARE

## 2021-12-09 RX ORDER — SERTRALINE HYDROCHLORIDE 100 MG/1
TABLET, FILM COATED ORAL
Qty: 180 TABLET | Refills: 1 | OUTPATIENT
Start: 2021-12-09

## 2021-12-13 RX ORDER — SERTRALINE HYDROCHLORIDE 100 MG/1
TABLET, FILM COATED ORAL
Qty: 180 TABLET | Refills: 1 | Status: SHIPPED | OUTPATIENT
Start: 2021-12-13 | End: 2022-06-21

## 2022-03-07 ENCOUNTER — PES CALL (OUTPATIENT)
Dept: ADMINISTRATIVE | Facility: CLINIC | Age: 77
End: 2022-03-07
Payer: MEDICARE

## 2022-05-30 ENCOUNTER — OFFICE VISIT (OUTPATIENT)
Dept: FAMILY MEDICINE | Facility: CLINIC | Age: 77
End: 2022-05-30
Payer: MEDICARE

## 2022-05-30 VITALS
DIASTOLIC BLOOD PRESSURE: 80 MMHG | OXYGEN SATURATION: 95 % | BODY MASS INDEX: 27.05 KG/M2 | WEIGHT: 147 LBS | HEART RATE: 71 BPM | HEIGHT: 62 IN | SYSTOLIC BLOOD PRESSURE: 124 MMHG

## 2022-05-30 DIAGNOSIS — D22.9 ATYPICAL MOLE: Primary | ICD-10-CM

## 2022-05-30 PROCEDURE — 3074F PR MOST RECENT SYSTOLIC BLOOD PRESSURE < 130 MM HG: ICD-10-PCS | Mod: CPTII,S$GLB,, | Performed by: PHYSICIAN ASSISTANT

## 2022-05-30 PROCEDURE — 3288F PR FALLS RISK ASSESSMENT DOCUMENTED: ICD-10-PCS | Mod: CPTII,S$GLB,, | Performed by: PHYSICIAN ASSISTANT

## 2022-05-30 PROCEDURE — 1159F MED LIST DOCD IN RCRD: CPT | Mod: CPTII,S$GLB,, | Performed by: PHYSICIAN ASSISTANT

## 2022-05-30 PROCEDURE — 1160F PR REVIEW ALL MEDS BY PRESCRIBER/CLIN PHARMACIST DOCUMENTED: ICD-10-PCS | Mod: CPTII,S$GLB,, | Performed by: PHYSICIAN ASSISTANT

## 2022-05-30 PROCEDURE — 1157F PR ADVANCE CARE PLAN OR EQUIV PRESENT IN MEDICAL RECORD: ICD-10-PCS | Mod: CPTII,S$GLB,, | Performed by: PHYSICIAN ASSISTANT

## 2022-05-30 PROCEDURE — 1159F PR MEDICATION LIST DOCUMENTED IN MEDICAL RECORD: ICD-10-PCS | Mod: CPTII,S$GLB,, | Performed by: PHYSICIAN ASSISTANT

## 2022-05-30 PROCEDURE — 99214 PR OFFICE/OUTPT VISIT, EST, LEVL IV, 30-39 MIN: ICD-10-PCS | Mod: S$GLB,,, | Performed by: PHYSICIAN ASSISTANT

## 2022-05-30 PROCEDURE — 1126F PR PAIN SEVERITY QUANTIFIED, NO PAIN PRESENT: ICD-10-PCS | Mod: CPTII,S$GLB,, | Performed by: PHYSICIAN ASSISTANT

## 2022-05-30 PROCEDURE — 99999 PR PBB SHADOW E&M-EST. PATIENT-LVL IV: CPT | Mod: PBBFAC,,, | Performed by: PHYSICIAN ASSISTANT

## 2022-05-30 PROCEDURE — 3079F DIAST BP 80-89 MM HG: CPT | Mod: CPTII,S$GLB,, | Performed by: PHYSICIAN ASSISTANT

## 2022-05-30 PROCEDURE — 3288F FALL RISK ASSESSMENT DOCD: CPT | Mod: CPTII,S$GLB,, | Performed by: PHYSICIAN ASSISTANT

## 2022-05-30 PROCEDURE — 1101F PT FALLS ASSESS-DOCD LE1/YR: CPT | Mod: CPTII,S$GLB,, | Performed by: PHYSICIAN ASSISTANT

## 2022-05-30 PROCEDURE — 1160F RVW MEDS BY RX/DR IN RCRD: CPT | Mod: CPTII,S$GLB,, | Performed by: PHYSICIAN ASSISTANT

## 2022-05-30 PROCEDURE — 3074F SYST BP LT 130 MM HG: CPT | Mod: CPTII,S$GLB,, | Performed by: PHYSICIAN ASSISTANT

## 2022-05-30 PROCEDURE — 99214 OFFICE O/P EST MOD 30 MIN: CPT | Mod: S$GLB,,, | Performed by: PHYSICIAN ASSISTANT

## 2022-05-30 PROCEDURE — 3079F PR MOST RECENT DIASTOLIC BLOOD PRESSURE 80-89 MM HG: ICD-10-PCS | Mod: CPTII,S$GLB,, | Performed by: PHYSICIAN ASSISTANT

## 2022-05-30 PROCEDURE — 1101F PR PT FALLS ASSESS DOC 0-1 FALLS W/OUT INJ PAST YR: ICD-10-PCS | Mod: CPTII,S$GLB,, | Performed by: PHYSICIAN ASSISTANT

## 2022-05-30 PROCEDURE — 99999 PR PBB SHADOW E&M-EST. PATIENT-LVL IV: ICD-10-PCS | Mod: PBBFAC,,, | Performed by: PHYSICIAN ASSISTANT

## 2022-05-30 PROCEDURE — 1126F AMNT PAIN NOTED NONE PRSNT: CPT | Mod: CPTII,S$GLB,, | Performed by: PHYSICIAN ASSISTANT

## 2022-05-30 PROCEDURE — 1157F ADVNC CARE PLAN IN RCRD: CPT | Mod: CPTII,S$GLB,, | Performed by: PHYSICIAN ASSISTANT

## 2022-05-30 NOTE — PROGRESS NOTES
Subjective:       Patient ID: Zara Ann is a 77 y.o. female.    Chief Complaint: Mass (She states she noticed a black bump on her inner leg a week ago.)    HPI     Pt is new to me, PCP Dr. Adam.     Pt is a 77 year old female with cervical DDD, bilateral carpral tunnel, depression, reactive airway dz, hx of genital herpes, ulcerative colitis, GERD, diverticulosis, osteopenia, and gout. She presents today complaining of a concerning mole at her right inner thigh that she noticed about 1 week ago. No bleeding, itching.     Review of Systems   Constitutional: Negative for activity change, appetite change, chills, fatigue, fever and unexpected weight change.   HENT: Negative for nasal congestion, ear pain, hearing loss, rhinorrhea and sore throat.    Eyes: Negative for visual disturbance.   Respiratory: Negative for cough, chest tightness, shortness of breath and wheezing.    Cardiovascular: Negative for chest pain and palpitations.   Gastrointestinal: Negative for abdominal pain, change in bowel habit, constipation, diarrhea, nausea, vomiting, reflux and change in bowel habit.   Genitourinary: Negative for difficulty urinating, dysuria, frequency, hematuria and urgency.   Musculoskeletal: Negative for arthralgias and myalgias.   Integumentary:  Positive for mole/lesion. Negative for rash.   Neurological: Negative for dizziness, vertigo, seizures, syncope, weakness, light-headedness and headaches.   Psychiatric/Behavioral: Negative for dysphoric mood and sleep disturbance. The patient is not nervous/anxious.          Objective:      Physical Exam  Vitals and nursing note reviewed.   Constitutional:       General: She is not in acute distress.     Appearance: Normal appearance. She is not ill-appearing, toxic-appearing or diaphoretic.   HENT:      Head: Normocephalic and atraumatic.      Right Ear: External ear normal.      Left Ear: External ear normal.   Eyes:      General: No scleral icterus.        Right  eye: No discharge.         Left eye: No discharge.      Extraocular Movements: Extraocular movements intact.      Conjunctiva/sclera: Conjunctivae normal.      Pupils: Pupils are equal, round, and reactive to light.   Cardiovascular:      Rate and Rhythm: Normal rate and regular rhythm.      Pulses: Normal pulses.      Heart sounds: Normal heart sounds. No murmur heard.    No friction rub. No gallop.   Pulmonary:      Effort: Pulmonary effort is normal. No respiratory distress.      Breath sounds: Normal breath sounds. No stridor. No wheezing, rhonchi or rales.   Musculoskeletal:         General: No deformity or signs of injury.      Cervical back: Normal range of motion and neck supple.      Right lower leg: No edema.      Left lower leg: No edema.   Lymphadenopathy:      Cervical: No cervical adenopathy.   Skin:     General: Skin is warm.      Capillary Refill: Capillary refill takes less than 2 seconds.      Coloration: Skin is not jaundiced or pale.      Findings: Lesion present. No rash.             Comments: Mole at inner thigh, irregular border and 2 different pigmentations   Neurological:      General: No focal deficit present.      Mental Status: She is alert and oriented to person, place, and time. Mental status is at baseline.   Psychiatric:         Mood and Affect: Mood normal.         Behavior: Behavior normal.         Thought Content: Thought content normal.         Judgment: Judgment normal.             Assessment:       Problem List Items Addressed This Visit    None     Visit Diagnoses     Atypical mole    -  Primary    Relevant Orders    Ambulatory referral/consult to Dermatology          Plan:         1. Atypical mole    - Ambulatory referral/consult to Dermatology; Future    RTC/ER precautions given. F/U with me prn

## 2022-05-30 NOTE — PATIENT INSTRUCTIONS
A few reminders from today:      Schedule with derm      Follow up with me if needed.   Please go to ER/urgent care if after hours or symptoms persist/worsen.     Do not hesitate to get in touch with me should you have any further questions.     Thank you for trusting me with your care!  I wish you health and happiness.    -Lindsay Levine PA-C

## 2022-06-06 ENCOUNTER — TELEPHONE (OUTPATIENT)
Dept: DERMATOLOGY | Facility: CLINIC | Age: 77
End: 2022-06-06
Payer: MEDICARE

## 2022-06-06 NOTE — TELEPHONE ENCOUNTER
----- Message from Osiel Espinosa sent at 6/6/2022  1:15 PM CDT -----  Contact: pt at Type: Needs Medical Advice  Type: Needs Medical Advice  Who Called:  pt  Best Call Back Number: [phone]  Additional Information: pt is calling the office to schedule an appt due to her having melanma and she needs to be seen soon as possible. Please call back and advise.

## 2022-06-06 NOTE — TELEPHONE ENCOUNTER
Tried to reach pt. No answer, will try again later and I left a message on answering machine.    Contacting to inform pt of available appt.

## 2022-06-13 ENCOUNTER — TELEPHONE (OUTPATIENT)
Dept: DERMATOLOGY | Facility: CLINIC | Age: 77
End: 2022-06-13
Payer: MEDICARE

## 2022-06-13 NOTE — TELEPHONE ENCOUNTER
Spoke w/ pt. Pt declined making an appt with office as she has an appt with Dr. Dolan for next week.

## 2022-06-14 ENCOUNTER — TELEPHONE (OUTPATIENT)
Dept: DERMATOLOGY | Facility: CLINIC | Age: 77
End: 2022-06-14
Payer: MEDICARE

## 2022-06-14 NOTE — TELEPHONE ENCOUNTER
Called patient to schedule Neurology appointment.  Patient stated he/she have been scheduled externally.

## 2022-06-16 ENCOUNTER — HOSPITAL ENCOUNTER (OUTPATIENT)
Dept: RADIOLOGY | Facility: HOSPITAL | Age: 77
Discharge: HOME | End: 2022-06-16
Attending: FAMILY MEDICINE
Payer: MEDICARE

## 2022-06-16 VITALS — WEIGHT: 147.06 LBS | BODY MASS INDEX: 27.06 KG/M2 | HEIGHT: 62 IN

## 2022-06-16 DIAGNOSIS — Z12.31 ENCOUNTER FOR SCREENING MAMMOGRAM FOR MALIGNANT NEOPLASM OF BREAST: ICD-10-CM

## 2022-06-16 DIAGNOSIS — Z78.0 POSTMENOPAUSAL STATE: ICD-10-CM

## 2022-06-16 PROCEDURE — 77080 DXA BONE DENSITY AXIAL: CPT | Mod: 26,,, | Performed by: RADIOLOGY

## 2022-06-16 PROCEDURE — 77080 DXA BONE DENSITY AXIAL: CPT | Mod: TC,PO

## 2022-06-16 PROCEDURE — 77063 MAMMO DIGITAL SCREENING BILAT WITH TOMO: ICD-10-PCS | Mod: 26,,, | Performed by: RADIOLOGY

## 2022-06-16 PROCEDURE — 77063 BREAST TOMOSYNTHESIS BI: CPT | Mod: TC,PO

## 2022-06-16 PROCEDURE — 77063 BREAST TOMOSYNTHESIS BI: CPT | Mod: 26,,, | Performed by: RADIOLOGY

## 2022-06-16 PROCEDURE — 77080 DEXA BONE DENSITY SPINE HIP: ICD-10-PCS | Mod: 26,,, | Performed by: RADIOLOGY

## 2022-06-16 PROCEDURE — 77067 SCR MAMMO BI INCL CAD: CPT | Mod: 26,,, | Performed by: RADIOLOGY

## 2022-06-16 PROCEDURE — 77067 MAMMO DIGITAL SCREENING BILAT WITH TOMO: ICD-10-PCS | Mod: 26,,, | Performed by: RADIOLOGY

## 2022-06-16 PROCEDURE — 77067 SCR MAMMO BI INCL CAD: CPT | Mod: TC,PO

## 2022-06-17 ENCOUNTER — OFFICE VISIT (OUTPATIENT)
Dept: FAMILY MEDICINE | Facility: CLINIC | Age: 77
End: 2022-06-17
Payer: MEDICARE

## 2022-06-17 VITALS
OXYGEN SATURATION: 98 % | HEART RATE: 68 BPM | HEIGHT: 62 IN | BODY MASS INDEX: 26.9 KG/M2 | WEIGHT: 146.19 LBS | DIASTOLIC BLOOD PRESSURE: 80 MMHG | SYSTOLIC BLOOD PRESSURE: 124 MMHG

## 2022-06-17 DIAGNOSIS — G95.20 CORD COMPRESSION MYELOPATHY: ICD-10-CM

## 2022-06-17 DIAGNOSIS — M46.99 UNSPECIFIED INFLAMMATORY SPONDYLOPATHY, MULTIPLE SITES IN SPINE: ICD-10-CM

## 2022-06-17 DIAGNOSIS — I65.23 BILATERAL CAROTID ARTERY STENOSIS: ICD-10-CM

## 2022-06-17 DIAGNOSIS — K51.00 ULCERATIVE PANCOLITIS WITHOUT COMPLICATION: ICD-10-CM

## 2022-06-17 DIAGNOSIS — D69.2 NONTHROMBOCYTOPENIC PURPURA: ICD-10-CM

## 2022-06-17 DIAGNOSIS — F33.40 RECURRENT MAJOR DEPRESSIVE DISORDER, IN REMISSION: ICD-10-CM

## 2022-06-17 DIAGNOSIS — Z00.00 ENCOUNTER FOR PREVENTIVE HEALTH EXAMINATION: Primary | ICD-10-CM

## 2022-06-17 PROCEDURE — G0439 PR MEDICARE ANNUAL WELLNESS SUBSEQUENT VISIT: ICD-10-PCS | Mod: S$GLB,,, | Performed by: NURSE PRACTITIONER

## 2022-06-17 PROCEDURE — 1157F ADVNC CARE PLAN IN RCRD: CPT | Mod: CPTII,S$GLB,, | Performed by: NURSE PRACTITIONER

## 2022-06-17 PROCEDURE — 1100F PTFALLS ASSESS-DOCD GE2>/YR: CPT | Mod: CPTII,S$GLB,, | Performed by: NURSE PRACTITIONER

## 2022-06-17 PROCEDURE — 99999 PR PBB SHADOW E&M-EST. PATIENT-LVL III: ICD-10-PCS | Mod: PBBFAC,,, | Performed by: NURSE PRACTITIONER

## 2022-06-17 PROCEDURE — 99499 RISK ADDL DX/OHS AUDIT: ICD-10-PCS | Mod: S$GLB,,, | Performed by: NURSE PRACTITIONER

## 2022-06-17 PROCEDURE — 1126F AMNT PAIN NOTED NONE PRSNT: CPT | Mod: CPTII,S$GLB,, | Performed by: NURSE PRACTITIONER

## 2022-06-17 PROCEDURE — 3079F PR MOST RECENT DIASTOLIC BLOOD PRESSURE 80-89 MM HG: ICD-10-PCS | Mod: CPTII,S$GLB,, | Performed by: NURSE PRACTITIONER

## 2022-06-17 PROCEDURE — 99999 PR PBB SHADOW E&M-EST. PATIENT-LVL III: CPT | Mod: PBBFAC,,, | Performed by: NURSE PRACTITIONER

## 2022-06-17 PROCEDURE — 3074F SYST BP LT 130 MM HG: CPT | Mod: CPTII,S$GLB,, | Performed by: NURSE PRACTITIONER

## 2022-06-17 PROCEDURE — 1160F PR REVIEW ALL MEDS BY PRESCRIBER/CLIN PHARMACIST DOCUMENTED: ICD-10-PCS | Mod: CPTII,S$GLB,, | Performed by: NURSE PRACTITIONER

## 2022-06-17 PROCEDURE — G0439 PPPS, SUBSEQ VISIT: HCPCS | Mod: S$GLB,,, | Performed by: NURSE PRACTITIONER

## 2022-06-17 PROCEDURE — 3288F PR FALLS RISK ASSESSMENT DOCUMENTED: ICD-10-PCS | Mod: CPTII,S$GLB,, | Performed by: NURSE PRACTITIONER

## 2022-06-17 PROCEDURE — 1160F RVW MEDS BY RX/DR IN RCRD: CPT | Mod: CPTII,S$GLB,, | Performed by: NURSE PRACTITIONER

## 2022-06-17 PROCEDURE — 1159F PR MEDICATION LIST DOCUMENTED IN MEDICAL RECORD: ICD-10-PCS | Mod: CPTII,S$GLB,, | Performed by: NURSE PRACTITIONER

## 2022-06-17 PROCEDURE — 3074F PR MOST RECENT SYSTOLIC BLOOD PRESSURE < 130 MM HG: ICD-10-PCS | Mod: CPTII,S$GLB,, | Performed by: NURSE PRACTITIONER

## 2022-06-17 PROCEDURE — 1100F PR PT FALLS ASSESS DOC 2+ FALLS/FALL W/INJURY/YR: ICD-10-PCS | Mod: CPTII,S$GLB,, | Performed by: NURSE PRACTITIONER

## 2022-06-17 PROCEDURE — 3079F DIAST BP 80-89 MM HG: CPT | Mod: CPTII,S$GLB,, | Performed by: NURSE PRACTITIONER

## 2022-06-17 PROCEDURE — 1157F PR ADVANCE CARE PLAN OR EQUIV PRESENT IN MEDICAL RECORD: ICD-10-PCS | Mod: CPTII,S$GLB,, | Performed by: NURSE PRACTITIONER

## 2022-06-17 PROCEDURE — 3288F FALL RISK ASSESSMENT DOCD: CPT | Mod: CPTII,S$GLB,, | Performed by: NURSE PRACTITIONER

## 2022-06-17 PROCEDURE — 1159F MED LIST DOCD IN RCRD: CPT | Mod: CPTII,S$GLB,, | Performed by: NURSE PRACTITIONER

## 2022-06-17 PROCEDURE — 1126F PR PAIN SEVERITY QUANTIFIED, NO PAIN PRESENT: ICD-10-PCS | Mod: CPTII,S$GLB,, | Performed by: NURSE PRACTITIONER

## 2022-06-17 PROCEDURE — 99499 UNLISTED E&M SERVICE: CPT | Mod: S$GLB,,, | Performed by: NURSE PRACTITIONER

## 2022-06-17 NOTE — PATIENT INSTRUCTIONS
Counseling and Referral of Other Preventative  (Italic type indicates deductible and co-insurance are waived)    Patient Name: Zara Ann  Today's Date: 6/17/2022    Health Maintenance       Date Due Completion Date    Shingles Vaccine (2 of 2) 08/31/2021 7/6/2021    COVID-19 Vaccine (4 - Booster for Pfizer series) 04/03/2022 12/3/2021    Colorectal Cancer Screening 01/16/2023 1/16/2018    Override on 6/10/2016: Done    Override on 5/21/2013: Done    DEXA Scan 06/16/2025 6/16/2022    Override on 5/4/2015: Done (media tab)    Override on 4/29/2013: Done ([place]. T score -2.5 spine, and -0.9 femur)    Lipid Panel 04/06/2026 4/6/2021    Override on 6/4/2014: Done    TETANUS VACCINE 10/28/2029 10/28/2019        No orders of the defined types were placed in this encounter.    The following information is provided to all patients.  This information is to help you find resources for any of the problems found today that may be affecting your health:                Living healthy guide: www.[facility].[place].gov      Understanding Diabetes: www.diabetes.org      Eating healthy: www.cdc.gov/healthyweight      CDC home safety checklist: www.cdc.gov/steadi/patient.html      Agency on Aging: www.goea.[place].[facility]      Alcoholics anonymous (AA): www.aa.org      Physical Activity: www.shawn.nih.gov/nx8jomz      Tobacco use: www.quitwithusla.org

## 2022-06-17 NOTE — PROGRESS NOTES
"  Zara Ann presented for a  Medicare AWV and comprehensive Health Risk Assessment today. The following components were reviewed and updated:    · Medical history  · Family History  · Social history  · Allergies and Current Medications  · Health Risk Assessment  · Health Maintenance  · Care Team         ** See Completed Assessments for Annual Wellness Visit within the encounter summary.**         The following assessments were completed:  · Living Situation  · CAGE  · Depression Screening  · Timed Get Up and Go  · Whisper Test  · Cognitive Function Screening          · Nutrition Screening  · ADL Screening  · PAQ Screening        Vitals:    06/17/22 1021   BP: 124/80   Pulse: 68   SpO2: 98%   Weight: 66.3 kg (146 lb 2.6 oz)   Height: 5' 2" (1.575 m)     Body mass index is 26.73 kg/m².  Physical Exam  Vitals reviewed.   Constitutional:       General: She is not in acute distress.  HENT:      Head: Normocephalic.   Cardiovascular:      Rate and Rhythm: Normal rate.   Pulmonary:      Effort: Pulmonary effort is normal. No respiratory distress.   Skin:     General: Skin is warm.   Neurological:      General: No focal deficit present.      Mental Status: She is alert.   Psychiatric:         Mood and Affect: Mood normal.               Diagnoses and health risks identified today and associated recommendations/orders:    1. Encounter for preventive health examination  Reviewed health maintenance and provided recommendations   Written rx for shingrix provided     2. Cord compression myelopathy  Continue to monitor  Followed by Dr. Monahan.      3. Recurrent major depressive disorder, in remission  Continue to monitor  Followed by Ashlie Adam MD   No si/hi.      4. 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      5. Ulcerative pancolitis without complication  Continue to monitor  Followed by Dr Angel.      6. Nonthrombocytopenic purpura  Continue to " monitor  Followed by Ashlie Adam MD .      7. Bilateral carotid artery stenosis  Continue to monitor  Followed by Ashlie Adam MD   Carotid US 10/12/17.        Provided Zara with a 5-10 year written screening schedule and personal prevention plan. Recommendations were developed using the USPSTF age appropriate recommendations. Education, counseling, and referrals were provided as needed. After Visit Summary printed and given to patient which includes a list of additional screenings\tests needed.    Follow up in one year    Ching Estrada NP    I offered to discuss advanced care planning, including how to pick a person who would make decisions for you if you were unable to make them for yourself, called a health care power of , and what kind of decisions you might make such as use of life sustaining treatments such as ventilators and tube feeding when faced with a life limiting illness recorded on a living will that they will need to know. (How you want to be cared for as you near the end of your natural life)     X  Patient has advanced directives written and agrees to provide copies to the institution.

## 2022-06-19 NOTE — TELEPHONE ENCOUNTER
Refill Routing Note   Medication(s) are not appropriate for processing by [facility] for the following reason(s):      - Drug-Disease Interaction (sertraline and Fatty liver)    [facility] action(s):  Defer Medication-related problems identified: Drug-disease interaction        Medication reconciliation completed: No     Appointments  past 12m or future 3m with PCP    Date Provider   Last Visit   7/6/2021 Ashlie Adam MD   Next Visit   Visit date not found Ashlie Adam MD   ED visits in past 90 days: 0        Note composed:7:35 PM 06/18/2022

## 2022-06-21 RX ORDER — SERTRALINE HYDROCHLORIDE 100 MG/1
TABLET, FILM COATED ORAL
Qty: 180 TABLET | Refills: 3 | Status: SHIPPED | OUTPATIENT
Start: 2022-06-21 | End: 2023-06-30

## 2022-06-22 PROBLEM — E23.6 OTHER DISORDERS OF PITUITARY GLAND: Status: RESOLVED | Noted: 2021-07-06 | Resolved: 2022-06-22

## 2022-06-22 PROBLEM — I65.23 BILATERAL CAROTID ARTERY STENOSIS: Status: ACTIVE | Noted: 2022-06-22

## 2022-06-22 PROBLEM — D69.2 NONTHROMBOCYTOPENIC PURPURA: Status: ACTIVE | Noted: 2022-06-22

## 2022-07-05 NOTE — TELEPHONE ENCOUNTER
Reason for Disposition   Prevention of Shingles (vaccine info), questions about   Shingles, questions about    Protocols used:  [facility]    Zara was diagnosed with shingles but states sores have healed completely. She does not have scabs or lesions anymore. She wanted to know if she would be contagious to her granchildren who have all had the chicken pox vaccination. Advised she should no longer be contagious and if they are vaccinated anyway they would have a very low chance of catching chickenpox if she was still contagious since they have had the chickenpox vaccination.   PROVIDER:[TOKEN:[[id number]]] PROVIDER:[TOKEN:[[id number]]],PROVIDER:[TOKEN:[[id number]],FOLLOWUP:[1 week]]

## 2022-08-01 ENCOUNTER — NURSE TRIAGE (OUTPATIENT)
Dept: ADMINISTRATIVE | Facility: CLINIC | Age: 77
End: 2022-08-01
Payer: MEDICARE

## 2022-08-01 NOTE — TELEPHONE ENCOUNTER
Patient fell from the top of a ladder 1 week ago and hit the back of her head (unsure of ladder height). Patient immediately vomited x 3. She currently c/o nausea and dizziness since the incident. Advised per protocol to be seen in the office with PCP approval or to go to the nearest ED now. Patient would prefer to be seen by her PCP. Will send a message to the provider and staff.  Advised the patient to call back with any further questions or if symptoms worsen. Patient VU.       Reason for Disposition   [1] Vomiting once or more AND [2] no head CT Scan or MRI has been performed    Additional Information   Negative: Weakness (i.e., paralysis, loss of muscle strength) of the face, arm or leg on one side of the body   Negative: Loss of speech or garbled speech   Negative: Difficult to awaken or acting confused (e.g., disoriented, slurred speech)   Negative: Sounds like a life-threatening emergency to the triager   Negative: [1] Black eyes on both sides AND [2] onset within 24 hours of head injury   Negative: Concussion symptoms are worsening   Negative: [1] Knocked out (unconscious) > 1 minute AND [2] no head CT Scan or MRI has been performed    Protocols used: CONCUSSION (MTBI) LESS THAN 14 DAYS AGO FOLLOW-UP CALL-A-

## 2022-08-02 ENCOUNTER — TELEPHONE (OUTPATIENT)
Dept: FAMILY MEDICINE | Facility: CLINIC | Age: 77
End: 2022-08-02
Payer: MEDICARE

## 2022-08-02 NOTE — TELEPHONE ENCOUNTER
Spoke w/ pt , pt will go to [facility] . Pt understands the importance of getting evaluated by physician .-lp

## 2022-08-02 NOTE — TELEPHONE ENCOUNTER
----- Message from Nagi Doyle sent at 8/1/2022  4:14 PM CDT -----  Contact: self  Type: Needs Medical Advice  Who Called: patient  Best Call Back Number:  [phone](home)   Additional Information: pt on the line saying she fell off a ladder and is having symptoms of concussion. Another agent sent a message this morning she's called again today to see if she can get a call from office about her symptoms also directed her t go to ER if she needs to but she wants to speak with a nurse instead.

## 2022-08-08 ENCOUNTER — OFFICE VISIT (OUTPATIENT)
Dept: FAMILY MEDICINE | Facility: CLINIC | Age: 77
End: 2022-08-08
Payer: MEDICARE

## 2022-08-08 VITALS
SYSTOLIC BLOOD PRESSURE: 120 MMHG | WEIGHT: 147.38 LBS | DIASTOLIC BLOOD PRESSURE: 78 MMHG | BODY MASS INDEX: 26.96 KG/M2 | OXYGEN SATURATION: 98 % | HEART RATE: 67 BPM | RESPIRATION RATE: 12 BRPM

## 2022-08-08 DIAGNOSIS — R42 DIZZINESS: ICD-10-CM

## 2022-08-08 DIAGNOSIS — M54.12 CERVICAL RADICULOPATHY: ICD-10-CM

## 2022-08-08 DIAGNOSIS — S06.0X0A CONCUSSION WITHOUT LOSS OF CONSCIOUSNESS, INITIAL ENCOUNTER: Primary | ICD-10-CM

## 2022-08-08 PROCEDURE — 3074F SYST BP LT 130 MM HG: CPT | Mod: CPTII,S$GLB,, | Performed by: FAMILY MEDICINE

## 2022-08-08 PROCEDURE — 3288F PR FALLS RISK ASSESSMENT DOCUMENTED: ICD-10-PCS | Mod: CPTII,S$GLB,, | Performed by: FAMILY MEDICINE

## 2022-08-08 PROCEDURE — 3074F PR MOST RECENT SYSTOLIC BLOOD PRESSURE < 130 MM HG: ICD-10-PCS | Mod: CPTII,S$GLB,, | Performed by: FAMILY MEDICINE

## 2022-08-08 PROCEDURE — 1126F AMNT PAIN NOTED NONE PRSNT: CPT | Mod: CPTII,S$GLB,, | Performed by: FAMILY MEDICINE

## 2022-08-08 PROCEDURE — 99214 PR OFFICE/OUTPT VISIT, EST, LEVL IV, 30-39 MIN: ICD-10-PCS | Mod: S$GLB,,, | Performed by: FAMILY MEDICINE

## 2022-08-08 PROCEDURE — 1126F PR PAIN SEVERITY QUANTIFIED, NO PAIN PRESENT: ICD-10-PCS | Mod: CPTII,S$GLB,, | Performed by: FAMILY MEDICINE

## 2022-08-08 PROCEDURE — 3078F DIAST BP <80 MM HG: CPT | Mod: CPTII,S$GLB,, | Performed by: FAMILY MEDICINE

## 2022-08-08 PROCEDURE — 1157F PR ADVANCE CARE PLAN OR EQUIV PRESENT IN MEDICAL RECORD: ICD-10-PCS | Mod: CPTII,S$GLB,, | Performed by: FAMILY MEDICINE

## 2022-08-08 PROCEDURE — 1100F PR PT FALLS ASSESS DOC 2+ FALLS/FALL W/INJURY/YR: ICD-10-PCS | Mod: CPTII,S$GLB,, | Performed by: FAMILY MEDICINE

## 2022-08-08 PROCEDURE — 99999 PR PBB SHADOW E&M-EST. PATIENT-LVL III: ICD-10-PCS | Mod: PBBFAC,,, | Performed by: FAMILY MEDICINE

## 2022-08-08 PROCEDURE — 1157F ADVNC CARE PLAN IN RCRD: CPT | Mod: CPTII,S$GLB,, | Performed by: FAMILY MEDICINE

## 2022-08-08 PROCEDURE — 99999 PR PBB SHADOW E&M-EST. PATIENT-LVL III: CPT | Mod: PBBFAC,,, | Performed by: FAMILY MEDICINE

## 2022-08-08 PROCEDURE — 3078F PR MOST RECENT DIASTOLIC BLOOD PRESSURE < 80 MM HG: ICD-10-PCS | Mod: CPTII,S$GLB,, | Performed by: FAMILY MEDICINE

## 2022-08-08 PROCEDURE — 99214 OFFICE O/P EST MOD 30 MIN: CPT | Mod: S$GLB,,, | Performed by: FAMILY MEDICINE

## 2022-08-08 PROCEDURE — 3288F FALL RISK ASSESSMENT DOCD: CPT | Mod: CPTII,S$GLB,, | Performed by: FAMILY MEDICINE

## 2022-08-08 PROCEDURE — 1100F PTFALLS ASSESS-DOCD GE2>/YR: CPT | Mod: CPTII,S$GLB,, | Performed by: FAMILY MEDICINE

## 2022-08-08 RX ORDER — MECLIZINE HCL 12.5 MG 12.5 MG/1
12.5 TABLET ORAL 3 TIMES DAILY PRN
Qty: 20 TABLET | Refills: 0 | Status: SHIPPED | OUTPATIENT
Start: 2022-08-08 | End: 2023-04-08

## 2022-08-08 RX ORDER — LEVOCETIRIZINE DIHYDROCHLORIDE 5 MG/1
TABLET, FILM COATED ORAL
COMMUNITY
Start: 2022-06-24

## 2022-08-08 NOTE — PROGRESS NOTES
Subjective:       Patient ID: Zara Ann is a 77 y.o. female.    Chief Complaint: Follow-up (fall)    HPI  Patient in clinic for f/u of fall.  She called nurse triage on 8/1 following a fall from 6ft ladder flat onto her back/head. No LOC. Immediate n/v. No HA at the time.   She did go to the ER.  Still dizzy and nausea. +head pressure. Vision is ok, eye appt tomorrow.   Sleep is ok, occ daytime nap.   Her sister is calling daily to check on her.   Dizziness still noticeable with some activities - walking.     Review of Systems:  Review of Systems   Constitutional: Positive for fatigue. Negative for fever and unexpected weight change.   HENT: Negative for congestion and sore throat.    Eyes: Negative for discharge and redness.   Respiratory: Negative for cough and wheezing.    Cardiovascular: Negative for palpitations and leg swelling.   Gastrointestinal: Positive for nausea (only with dizziness). Negative for constipation and diarrhea.   Musculoskeletal: Negative for arthralgias and gait problem.   Neurological: Positive for dizziness (with head turning) and headaches. Negative for light-headedness.   Psychiatric/Behavioral: Negative for sleep disturbance. The patient is not nervous/anxious.        Objective:     Vitals:    08/08/22 1048   BP: 120/78   Pulse: 67   Resp: 12   SpO2: 98%   Weight: 66.8 kg (147 lb 6 oz)          Physical Exam  Vitals and nursing note reviewed.   Constitutional:       General: She is not in acute distress.     Appearance: Normal appearance. She is well-developed.   HENT:      Head: Normocephalic and atraumatic.   Eyes:      General: No scleral icterus.        Right eye: No discharge.         Left eye: No discharge.      Conjunctiva/sclera: Conjunctivae normal.   Cardiovascular:      Rate and Rhythm: Normal rate.   Pulmonary:      Effort: Pulmonary effort is normal. No respiratory distress.   Abdominal:      General: There is no distension.      Palpations: Abdomen is soft.    Musculoskeletal:      Cervical back: Neck supple.   Skin:     General: Skin is warm and dry.      Findings: No rash.   Neurological:      General: No focal deficit present.      Mental Status: She is alert and oriented to person, place, and time.   Psychiatric:         Mood and Affect: Mood normal.         Behavior: Behavior normal.           Assessment & Plan:  Concussion without loss of consciousness, initial encounter    Cervical radiculopathy    Dizziness    Other orders  -     meclizine (ANTIVERT) 12.5 mg tablet; Take 1 tablet (12.5 mg total) by mouth 3 (three) times daily as needed for Dizziness or Nausea.  Dispense: 20 tablet; Refill: 0    Self-care, rest emphasized.   Concussion precautions. Limit exertional activities.   ER records requested for review.

## 2022-08-19 ENCOUNTER — TELEPHONE (OUTPATIENT)
Dept: FAMILY MEDICINE | Facility: CLINIC | Age: 77
End: 2022-08-19
Payer: MEDICARE

## 2022-08-19 NOTE — TELEPHONE ENCOUNTER
----- Message from Ramonita Ramsay sent at 8/19/2022  3:15 PM CDT -----  .Type:  Patient Call Back    Who Called: PT       Does the patient know what this is regarding?: CONCUSSION     Would the patient rather a call back YES     Best Call Back Number: [phone]    Additional Information: IF PT DOESN'T ANSWER PLEASE TRY BACK     Thank You

## 2022-08-19 NOTE — TELEPHONE ENCOUNTER
----- Message from Armando Esquivel sent at 8/19/2022 10:43 AM CDT -----  Type: Needs Medical Advice  Who Called:  Pt  Symptoms (please be specific):      Best Call Back Number: [phone]     Additional Information: sts she wants to know if she can change her VV into an in person visit for the same date and time.  Sts she would feel better if she saw her in person.  Isn't sure she knows how to do the VV.  Would like a call back.  Please advise -- Thank you    I tried to reschedule but wouldn't give me same date and time.

## 2022-08-22 ENCOUNTER — OFFICE VISIT (OUTPATIENT)
Dept: FAMILY MEDICINE | Facility: CLINIC | Age: 77
End: 2022-08-22
Payer: MEDICARE

## 2022-08-22 ENCOUNTER — TELEPHONE (OUTPATIENT)
Dept: NEUROLOGY | Facility: CLINIC | Age: 77
End: 2022-08-22
Payer: MEDICARE

## 2022-08-22 VITALS
DIASTOLIC BLOOD PRESSURE: 78 MMHG | HEIGHT: 62 IN | WEIGHT: 146.06 LBS | OXYGEN SATURATION: 96 % | SYSTOLIC BLOOD PRESSURE: 138 MMHG | BODY MASS INDEX: 26.88 KG/M2 | HEART RATE: 66 BPM | RESPIRATION RATE: 18 BRPM

## 2022-08-22 DIAGNOSIS — S06.0X0A CONCUSSION WITHOUT LOSS OF CONSCIOUSNESS, INITIAL ENCOUNTER: Primary | ICD-10-CM

## 2022-08-22 PROCEDURE — 99999 PR PBB SHADOW E&M-EST. PATIENT-LVL IV: ICD-10-PCS | Mod: PBBFAC,,, | Performed by: FAMILY MEDICINE

## 2022-08-22 PROCEDURE — 1159F PR MEDICATION LIST DOCUMENTED IN MEDICAL RECORD: ICD-10-PCS | Mod: CPTII,S$GLB,, | Performed by: FAMILY MEDICINE

## 2022-08-22 PROCEDURE — 3288F FALL RISK ASSESSMENT DOCD: CPT | Mod: CPTII,S$GLB,, | Performed by: FAMILY MEDICINE

## 2022-08-22 PROCEDURE — 3078F PR MOST RECENT DIASTOLIC BLOOD PRESSURE < 80 MM HG: ICD-10-PCS | Mod: CPTII,S$GLB,, | Performed by: FAMILY MEDICINE

## 2022-08-22 PROCEDURE — 3288F PR FALLS RISK ASSESSMENT DOCUMENTED: ICD-10-PCS | Mod: CPTII,S$GLB,, | Performed by: FAMILY MEDICINE

## 2022-08-22 PROCEDURE — 1126F AMNT PAIN NOTED NONE PRSNT: CPT | Mod: CPTII,S$GLB,, | Performed by: FAMILY MEDICINE

## 2022-08-22 PROCEDURE — 1160F RVW MEDS BY RX/DR IN RCRD: CPT | Mod: CPTII,S$GLB,, | Performed by: FAMILY MEDICINE

## 2022-08-22 PROCEDURE — 99213 PR OFFICE/OUTPT VISIT, EST, LEVL III, 20-29 MIN: ICD-10-PCS | Mod: S$GLB,,, | Performed by: FAMILY MEDICINE

## 2022-08-22 PROCEDURE — 1101F PT FALLS ASSESS-DOCD LE1/YR: CPT | Mod: CPTII,S$GLB,, | Performed by: FAMILY MEDICINE

## 2022-08-22 PROCEDURE — 1101F PR PT FALLS ASSESS DOC 0-1 FALLS W/OUT INJ PAST YR: ICD-10-PCS | Mod: CPTII,S$GLB,, | Performed by: FAMILY MEDICINE

## 2022-08-22 PROCEDURE — 1126F PR PAIN SEVERITY QUANTIFIED, NO PAIN PRESENT: ICD-10-PCS | Mod: CPTII,S$GLB,, | Performed by: FAMILY MEDICINE

## 2022-08-22 PROCEDURE — 1157F ADVNC CARE PLAN IN RCRD: CPT | Mod: CPTII,S$GLB,, | Performed by: FAMILY MEDICINE

## 2022-08-22 PROCEDURE — 99999 PR PBB SHADOW E&M-EST. PATIENT-LVL IV: CPT | Mod: PBBFAC,,, | Performed by: FAMILY MEDICINE

## 2022-08-22 PROCEDURE — 1160F PR REVIEW ALL MEDS BY PRESCRIBER/CLIN PHARMACIST DOCUMENTED: ICD-10-PCS | Mod: CPTII,S$GLB,, | Performed by: FAMILY MEDICINE

## 2022-08-22 PROCEDURE — 3075F PR MOST RECENT SYSTOLIC BLOOD PRESS GE 130-139MM HG: ICD-10-PCS | Mod: CPTII,S$GLB,, | Performed by: FAMILY MEDICINE

## 2022-08-22 PROCEDURE — 1157F PR ADVANCE CARE PLAN OR EQUIV PRESENT IN MEDICAL RECORD: ICD-10-PCS | Mod: CPTII,S$GLB,, | Performed by: FAMILY MEDICINE

## 2022-08-22 PROCEDURE — 99213 OFFICE O/P EST LOW 20 MIN: CPT | Mod: S$GLB,,, | Performed by: FAMILY MEDICINE

## 2022-08-22 PROCEDURE — 1159F MED LIST DOCD IN RCRD: CPT | Mod: CPTII,S$GLB,, | Performed by: FAMILY MEDICINE

## 2022-08-22 PROCEDURE — 3075F SYST BP GE 130 - 139MM HG: CPT | Mod: CPTII,S$GLB,, | Performed by: FAMILY MEDICINE

## 2022-08-22 PROCEDURE — 3078F DIAST BP <80 MM HG: CPT | Mod: CPTII,S$GLB,, | Performed by: FAMILY MEDICINE

## 2022-08-22 NOTE — PROGRESS NOTES
"Subjective:       Patient ID: Zara Ann is a 77 y.o. female.    Chief Complaint: Follow-up (2 week f/u)    HPI  Patient in clinic for f/u of concussion.  She still feels some fogginess, occ dizziness with standing from sitting position. Some residual dizziness with head turning.  No residual nausea. +increased HA (apex) when compared to previous, using aleve prn. Eye exam was stable.   Sleep is ok; some nights she'll sleep straight thru, her dog woke her up at 5 this morning.   She took the antivert the first few days in particular which helped. Now that the nausea has subsided, no longer needing.   She feels like she's about 70% improved and is continuing to see improvement still. Has not yet tried to increase her activity levels.   Very infrequent balance checks.     Review of Systems:  Review of Systems   Constitutional: Negative for fatigue (improving), fever and unexpected weight change.   HENT: Negative for congestion and sore throat.    Eyes: Negative for discharge and redness.   Respiratory: Negative for cough and wheezing.    Cardiovascular: Negative for palpitations and leg swelling.   Gastrointestinal: Negative for diarrhea and nausea (resolved).   Musculoskeletal: Negative for arthralgias and gait problem.   Neurological: Positive for dizziness (still some, postural/head turning) and headaches. Negative for light-headedness.   Psychiatric/Behavioral: Negative for sleep disturbance. The patient is not nervous/anxious.        Objective:     Vitals:    08/22/22 1300   BP: 138/78   Pulse: 66   Resp: 18   SpO2: 96%   Weight: 66.3 kg (146 lb 0.9 oz)   Height: 5' 2" (1.575 m)          Physical Exam  Vitals and nursing note reviewed.   Constitutional:       General: She is not in acute distress.     Appearance: Normal appearance. She is well-developed.   HENT:      Head: Normocephalic and atraumatic.   Eyes:      General: No scleral icterus.        Right eye: No discharge.         Left eye: No " discharge.      Conjunctiva/sclera: Conjunctivae normal.   Cardiovascular:      Rate and Rhythm: Normal rate and regular rhythm.   Pulmonary:      Effort: Pulmonary effort is normal. No respiratory distress.      Breath sounds: Normal breath sounds.   Abdominal:      General: There is no distension.      Palpations: Abdomen is soft.   Musculoskeletal:         General: No signs of injury.      Cervical back: Neck supple.      Right lower leg: No edema.      Left lower leg: No edema.   Lymphadenopathy:      Cervical: No cervical adenopathy.   Skin:     General: Skin is warm and dry.      Findings: No rash.   Neurological:      General: No focal deficit present.      Mental Status: She is alert and oriented to person, place, and time.   Psychiatric:         Mood and Affect: Mood normal.         Behavior: Behavior normal.           Assessment & Plan:  Concussion without loss of consciousness, initial encounter  -     Ambulatory referral/consult to Neurology; Future; Expected date: 08/29/2022    Improving, start to increase activities as tolerated. Plan f/u with neurology with any residual symptoms.   Will request records from [facility] for review.

## 2022-08-22 NOTE — TELEPHONE ENCOUNTER
In basket message was sent to [facility] staff to contact patient and schedule Neurology appointment.  Referral was transferred to [facility].

## 2022-08-25 ENCOUNTER — TELEPHONE (OUTPATIENT)
Dept: NEUROLOGY | Facility: CLINIC | Age: 77
End: 2022-08-25
Payer: MEDICARE

## 2022-08-25 NOTE — TELEPHONE ENCOUNTER
Spoke to pt and tried to schedule her with Dr. Matthews. When I told her, we are located at main [place], she asked for someone closer to her on the [facility]. I told her I will check for a concussion specialist on the [facility] and give her a call once I know.     ----- Message from Von Epstein MA sent at 8/25/2022 11:47 AM CDT -----  Regarding: FW: Referral    ----- Message -----  From: Olena Burrell MA  Sent: 8/22/2022   2:23 PM CDT  To: [facility] Staff  Subject: Referral                                         Please contact patient to schedule Neurology appointment for Concussion.    Thank you

## 2022-08-25 NOTE — TELEPHONE ENCOUNTER
Spoke with the pt and let her know that I have reached out to the staff of Dr. Kline and Dr. Lockett. I let her know that someone should be reaching out from their office to get her scheduled for the concussion follow up.     ----- Message from Von Epstein MA sent at 8/25/2022 11:47 AM CDT -----  Regarding: FW: Referral    ----- Message -----  From: Olena Burrell MA  Sent: 8/22/2022   2:23 PM CDT  To: [facility] Staff  Subject: Referral                                         Please contact patient to schedule Neurology appointment for Concussion.    Thank you

## 2022-09-12 ENCOUNTER — PATIENT MESSAGE (OUTPATIENT)
Dept: NEUROLOGY | Facility: CLINIC | Age: 77
End: 2022-09-12
Payer: MEDICARE

## 2022-09-13 ENCOUNTER — OFFICE VISIT (OUTPATIENT)
Dept: NEUROLOGY | Facility: CLINIC | Age: 77
End: 2022-09-13
Payer: MEDICARE

## 2022-09-13 VITALS
DIASTOLIC BLOOD PRESSURE: 64 MMHG | HEART RATE: 67 BPM | BODY MASS INDEX: 27.14 KG/M2 | RESPIRATION RATE: 17 BRPM | SYSTOLIC BLOOD PRESSURE: 136 MMHG | WEIGHT: 148.38 LBS

## 2022-09-13 DIAGNOSIS — M54.17 LUMBOSACRAL RADICULOPATHY: ICD-10-CM

## 2022-09-13 DIAGNOSIS — G60.9 IDIOPATHIC PERIPHERAL NEUROPATHY: Primary | ICD-10-CM

## 2022-09-13 PROCEDURE — 1160F RVW MEDS BY RX/DR IN RCRD: CPT | Mod: CPTII,S$GLB,, | Performed by: PSYCHIATRY & NEUROLOGY

## 2022-09-13 PROCEDURE — 3075F PR MOST RECENT SYSTOLIC BLOOD PRESS GE 130-139MM HG: ICD-10-PCS | Mod: CPTII,S$GLB,, | Performed by: PSYCHIATRY & NEUROLOGY

## 2022-09-13 PROCEDURE — 1126F AMNT PAIN NOTED NONE PRSNT: CPT | Mod: CPTII,S$GLB,, | Performed by: PSYCHIATRY & NEUROLOGY

## 2022-09-13 PROCEDURE — 3078F DIAST BP <80 MM HG: CPT | Mod: CPTII,S$GLB,, | Performed by: PSYCHIATRY & NEUROLOGY

## 2022-09-13 PROCEDURE — 1157F ADVNC CARE PLAN IN RCRD: CPT | Mod: CPTII,S$GLB,, | Performed by: PSYCHIATRY & NEUROLOGY

## 2022-09-13 PROCEDURE — 1100F PR PT FALLS ASSESS DOC 2+ FALLS/FALL W/INJURY/YR: ICD-10-PCS | Mod: CPTII,S$GLB,, | Performed by: PSYCHIATRY & NEUROLOGY

## 2022-09-13 PROCEDURE — 99999 PR PBB SHADOW E&M-EST. PATIENT-LVL III: CPT | Mod: PBBFAC,,, | Performed by: PSYCHIATRY & NEUROLOGY

## 2022-09-13 PROCEDURE — 1157F PR ADVANCE CARE PLAN OR EQUIV PRESENT IN MEDICAL RECORD: ICD-10-PCS | Mod: CPTII,S$GLB,, | Performed by: PSYCHIATRY & NEUROLOGY

## 2022-09-13 PROCEDURE — 99215 PR OFFICE/OUTPT VISIT, EST, LEVL V, 40-54 MIN: ICD-10-PCS | Mod: S$GLB,,, | Performed by: PSYCHIATRY & NEUROLOGY

## 2022-09-13 PROCEDURE — 1159F MED LIST DOCD IN RCRD: CPT | Mod: CPTII,S$GLB,, | Performed by: PSYCHIATRY & NEUROLOGY

## 2022-09-13 PROCEDURE — 99999 PR PBB SHADOW E&M-EST. PATIENT-LVL III: ICD-10-PCS | Mod: PBBFAC,,, | Performed by: PSYCHIATRY & NEUROLOGY

## 2022-09-13 PROCEDURE — 99215 OFFICE O/P EST HI 40 MIN: CPT | Mod: S$GLB,,, | Performed by: PSYCHIATRY & NEUROLOGY

## 2022-09-13 PROCEDURE — 3075F SYST BP GE 130 - 139MM HG: CPT | Mod: CPTII,S$GLB,, | Performed by: PSYCHIATRY & NEUROLOGY

## 2022-09-13 PROCEDURE — 3078F PR MOST RECENT DIASTOLIC BLOOD PRESSURE < 80 MM HG: ICD-10-PCS | Mod: CPTII,S$GLB,, | Performed by: PSYCHIATRY & NEUROLOGY

## 2022-09-13 PROCEDURE — 1159F PR MEDICATION LIST DOCUMENTED IN MEDICAL RECORD: ICD-10-PCS | Mod: CPTII,S$GLB,, | Performed by: PSYCHIATRY & NEUROLOGY

## 2022-09-13 PROCEDURE — 1100F PTFALLS ASSESS-DOCD GE2>/YR: CPT | Mod: CPTII,S$GLB,, | Performed by: PSYCHIATRY & NEUROLOGY

## 2022-09-13 PROCEDURE — 3288F PR FALLS RISK ASSESSMENT DOCUMENTED: ICD-10-PCS | Mod: CPTII,S$GLB,, | Performed by: PSYCHIATRY & NEUROLOGY

## 2022-09-13 PROCEDURE — 1160F PR REVIEW ALL MEDS BY PRESCRIBER/CLIN PHARMACIST DOCUMENTED: ICD-10-PCS | Mod: CPTII,S$GLB,, | Performed by: PSYCHIATRY & NEUROLOGY

## 2022-09-13 PROCEDURE — 3288F FALL RISK ASSESSMENT DOCD: CPT | Mod: CPTII,S$GLB,, | Performed by: PSYCHIATRY & NEUROLOGY

## 2022-09-13 PROCEDURE — 1126F PR PAIN SEVERITY QUANTIFIED, NO PAIN PRESENT: ICD-10-PCS | Mod: CPTII,S$GLB,, | Performed by: PSYCHIATRY & NEUROLOGY

## 2022-09-13 NOTE — PROGRESS NOTES
NEUROLOGY  Outpatient CONSULT  [facility]  [address] [facility] [address], [place] [address]  [phone] (office) / [phone] (fax)    Patient Name:  Zara Ann  :  1945  MR #:  [id number]  Acct #:  [id number]    Date of Neurology Consult: 2022  Name of Neurologist: Malena Angulo D.O, ABPN, AOBNP, ABEM    Other Physicians:  Ashlie Adam MD (Primary Care Physician); No ref. provider found (Referring)      Chief Complaint: Numbness (Bottom of feet )      History of Present Illness (HPI):  Zara Ann is a 77 y.o. female who scheduled an appointment with me in neuromuscular clinic via [facility]. She is new to me.    Patient has been following with Dr. Corado and Sidra Javier NP for neuropathy for the last few years.  She previously saw Dr. Page in neurology for cervical spine disease.      She states she scheduled this appointment  because she was told by her PCP that she needed to see neurology before 22.  She states no one told her it was for concussion so she told the staff she needed to be seen for neuropathy.    Patient states she has been having a feeling of tissue paper glued under her feet and between her toes. This has been present about 6 years.  She feels that in the last couple of years it has traveled up the legs some.  She states this is a numb and cold feeling.  She has no pain.  This is just aggravating.  She has had a workup for this condition including labs and an EMG.    She states no one has been able to tell her what is going on and what she needs to do.    Treatment to date:   N/A    Review of Systems:   See HPI    Past Medical, Surgical, Family & Social History:   Past Medical History:   Diagnosis Date    Cancer     left arm skin    Depression     Diverticulosis     Fatty liver     Genital herpes     GERD (gastroesophageal reflux disease)     Gout     History of use of hearing aid in left ear     Hx of rheumatic fever 2015     Osteopenia     RAD (reactive airway disease)     Rheumatic fever     UC (ulcerative colitis)      Past Surgical History:   Procedure Laterality Date    APPENDECTOMY      CARPAL TUNNEL RELEASE Left 5/24/2019    Procedure: RELEASE, CARPAL TUNNEL;  Surgeon: Daniel Goode MD;  Location: [facility] OR;  Service: Orthopedics;  Laterality: Left;    CHOLECYSTECTOMY      COLONOSCOPY  2013    every 2 yrs    DECOMPRESSION OF CERVICAL SPINE BY ANTERIOR APPROACH WITH FUSION N/A 10/22/2018    Procedure: DECOMPRESSION AND FUSION, SPINE, CERVICAL, ANTERIOR APPROACH  C5-C7 ANTERIOR CERVICAL DECOMPRESSION AND FUSION;  Surgeon: Demetri Caro MD;  Location: [facility] OR;  Service: Neurosurgery;  Laterality: N/A;    HYSTERECTOMY      parital - benign - fibroids    Rectocele  1983    with hysterectomy     Family History   Problem Relation Age of Onset    Cancer Mother         colon    Cancer Father         kidney     Ulcers Sister     Pancreatitis Sister     Breast cancer Sister     Liver cancer Paternal Aunt     Allergic rhinitis Neg Hx     Allergies Neg Hx     Angioedema Neg Hx     Asthma Neg Hx     Atopy Neg Hx     Eczema Neg Hx     Rhinitis Neg Hx     Urticaria Neg Hx     Immunodeficiency Neg Hx      Alcohol use:  reports current alcohol use.   (Of note, 0.6 oz = 1 beer or 6 oz = 10 beers).  Tobacco use:  reports that she has never smoked. She has never used smokeless tobacco.  Street drug use:  reports no history of drug use.  Allergies: Ciprofloxacin, Clindamycin, Nitrofurantoin monohyd/m-cryst, Sulfa (sulfonamide antibiotics), Cephalosporins, Hydrocod-cpm-pe-acetaminophen, Hydrocodone, Hydrocortisone, and Sulfamethoxazole-trimethoprim.    Home Medications:     Current Outpatient Medications:     acyclovir 5% (ZOVIRAX) 5 % ointment, APPLY OINTMENT TOPICALLY SIX TIMES DAILY, Disp: 15 g, Rfl: 1    milk thistle 175 mg tablet, Take 175 mg by mouth 2 (two) times daily. , Disp: , Rfl:     multivitamin capsule, Take 1 capsule by mouth  once daily., Disp: , Rfl:     sertraline (ZOLOFT) 100 MG tablet, Take 1 tablet by mouth twice daily, Disp: 180 tablet, Rfl: 3    TURMERIC ROOT EXTRACT ORAL, Take by mouth every evening. , Disp: , Rfl:     UNKNOWN TO PATIENT, Something over the counter for numbness to feet, Disp: , Rfl:     valACYclovir (VALTREX) 1000 MG tablet, TAKE 1 TABLET BY MOUTH ONCE DAILY AS NEEDED FOR  OUTBREAK, Disp: 10 tablet, Rfl: 1    levocetirizine (XYZAL) 5 MG tablet, SMARTSI Tablet(s) By Mouth Every Evening, Disp: , Rfl:     meclizine (ANTIVERT) 12.5 mg tablet, Take 1 tablet (12.5 mg total) by mouth 3 (three) times daily as needed for Dizziness or Nausea. (Patient not taking: Reported on 2022), Disp: 20 tablet, Rfl: 0  No current facility-administered medications for this visit.    Facility-Administered Medications Ordered in Other Visits:     lactated ringers infusion, 10 mL/hr, Intravenous, Continuous, Henry Villar MD, Stopped at 19 1200    lidocaine (PF) 10 mg/ml (1%) injection 10 mg, 1 mL, Intradermal, Once, Henry Villar MD    sodium chloride 0.9% flush 3 mL, 3 mL, Intravenous, Q8H, Henry Villar MD    Physical Examination:  /64 (BP Location: Right arm, Patient Position: Sitting, BP Method: Medium (Manual))   Pulse 67   Resp 17   Wt 67.3 kg (148 lb 5.9 oz)   BMI 27.14 kg/m²     GENERAL:  General appearance: Well, non-toxic appearing.  No apparent distress.  Fundi exam: normal.  Neck: supple.  Carotid auscultation: normal.  Heart auscultation: normal.  Peripheral pulses: normal.  Extremities: normal.    MENTAL STATUS:  Alertness, attention span & concentration: normal.  Language: normal.  Orientation to self, place & time:  normal.  Memory, recent & remote: normal.  Fund of knowledge: normal.    SPEECH:  Clear and fluent.  Follows complex commands.    CRANIAL NERVES:  Cranial Nerves II-XII were examined.  II - Visual fields: normal.  III, IV, VI: PERRL, EOMI, No ptosis, No nystagmus.  V  - Facial sensation: normal.  VII - Face symmetry & mobility: normal.  VIII - Hearing: normal.  IX, X - Palate: mobile & midline.  XI - Shoulder shrug: normal.  XII - Tongue protrusion: normal.    GROSS MOTOR:  Gait & station: normal.  Tone: normal.  Abnormal movements: none.  Finger-nose & Heel-knee-shin: normal.  Rapid alternating movements & drift: normal.    MUSCLE STRENGTH:     Fascics Atrophy RIGHT    LEFT Atrophy Fascics     5 Neck Ext. 5       5 Neck Flex 5       5 Deltoids 5       5 Sh.Ext.Rot. 5       5 Sh.Int.Rot. 5       5 Biceps 5       5 Triceps 5       5 Forearm.Pr. 5       5 Wrist Ext. 5       5 Wrist Flex 5       5 Finger Ext. 5       5 Finger Flex 5       5 FPL 5       5 Inteross. 5                         5- Iliopsoas 5-       5 Hip Abduct 5       5 Hip Adduct 5       5 Quads 5       5 Hams 5       5 Dorsiflex 5       5 Plantar Flex 5       5 Ankle Vinay 5       5 Ankle Invert 5       5 Toe Ext. 5       5 Toe Flex 5                         REFLEXES:    RIGHT Reflex   LEFT   2 Biceps 2   2 Brachiorad. 2   2 Triceps 2        2 Patellar 2   tr Ankle tr        Down PLANTAR Down     SENSORY:  Light touch: Normal throughout.  Sharp touch: decreased, but not absent, from mid shin down  Vibration: patchy areas of no vibratory sensation to bilateral knees, ankles and toes      Diagnostic Data Reviewed:   EMG 7/16/21  This is an abnormal EMG of both lower extremities.  There is electrophysiologic evidence of following:  Chronic, mild to moderate, length-dependent, peripheral polyneuropathy without active denervation.  Chronic, mild to moderate, right lumbosacral radiculopathy predominantly affecting the L5 and S1 nerve roots.    There is no evidence of focal neuropathy, plexopathy or myopathy on this study.     Component      Latest Ref Rng & Units 6/16/2021 4/6/2021   Protein, Serum      6.0 - 8.4 g/dL 7.0    Albumin grams/dl      3.35 - 5.55 g/dL 4.24    Alpha-1 grams/dl      0.17 - 0.41 g/dL 0.26     Alpha-2      0.43 - 0.99 g/dL 0.62    Beta      0.50 - 1.10 g/dL 0.78    Gamma      0.67 - 1.58 g/dL 1.11    Hemoglobin A1C External      4.0 - 5.6 %  4.6   Estimated Avg Glucose      68 - 131 mg/dL  85   Vitamin B-12      210 - 950 pg/mL  1097 (H)   Folate      4.0 - 24.0 ng/mL 16.2    Thiamine      38 - 122 ug/L 101    Vitamin B6      5 - 50 ug/L 67 (H)    Homocysteine      4.0 - 15.5 umol/L 9.1    RPR      Non-reactive Non-reactive    Pathologist Interpretation SPE       Normal        Assessment and Plan:  Zara Ann is a 77 y.o. woman with abnormal sensation in her feet and lower limbs.  Workup reveals a combination of painless peripheral neuropathy, which appears to be idiopathic, in addition to some lumbar spine disease which would not be unexpected in this age group.  Unfortunately, there is not much that can be done in this circumstance.  She had been advised at her previous neurology visit that her B6 vitamin was elevated, so there is no need for further supplementation.  Excess B6 can be irritating to nerves.      We discussed this information today.  She has no previous imaging of the lumbar spine, but at this time she has no pain, no weakness,  and no radicular patterned sensory loss.  Should she develop any radicular symptoms, imaging of the lumbar spine might be the next step.      No need for further follow up at this time.    Important to note, also  has a past medical history of Cancer, Depression, Diverticulosis, Fatty liver, Genital herpes, GERD (gastroesophageal reflux disease), Gout, History of use of hearing aid in left ear, rheumatic fever (4/28/2015), Osteopenia, RAD (reactive airway disease), Rheumatic fever, and UC (ulcerative colitis).    Time Spent: I spent a total of 49 minutes on the day of the visit.This includes face to face time and non-face to face time preparing to see the patient (eg, review of tests), Obtaining and/or reviewing separately obtained history, Documenting  clinical information in the electronic or other health record, Independently interpreting resultsand communicating results to the patient/family/caregiver, or Care coordination.       Malena Angulo D.O, ABPN, AOBNP, ABEM    This note was created with voice recognition software.  Grammatical, syntax and spelling errors may be inevitable.

## 2023-03-10 ENCOUNTER — PES CALL (OUTPATIENT)
Dept: ADMINISTRATIVE | Facility: CLINIC | Age: 78
End: 2023-03-10
Payer: MEDICARE

## 2023-04-04 ENCOUNTER — OFFICE VISIT (OUTPATIENT)
Dept: HOME HEALTH SERVICES | Facility: CLINIC | Age: 78
End: 2023-04-04
Payer: MEDICARE

## 2023-04-04 VITALS
BODY MASS INDEX: 26.31 KG/M2 | WEIGHT: 143 LBS | HEART RATE: 81 BPM | HEIGHT: 62 IN | DIASTOLIC BLOOD PRESSURE: 81 MMHG | SYSTOLIC BLOOD PRESSURE: 144 MMHG

## 2023-04-04 DIAGNOSIS — K21.9 GASTROESOPHAGEAL REFLUX DISEASE, UNSPECIFIED WHETHER ESOPHAGITIS PRESENT: ICD-10-CM

## 2023-04-04 DIAGNOSIS — G60.9 IDIOPATHIC PERIPHERAL NEUROPATHY: ICD-10-CM

## 2023-04-04 DIAGNOSIS — F33.40 RECURRENT MAJOR DEPRESSIVE DISORDER, IN REMISSION: ICD-10-CM

## 2023-04-04 DIAGNOSIS — D69.2 NONTHROMBOCYTOPENIC PURPURA: ICD-10-CM

## 2023-04-04 DIAGNOSIS — K51.00 ULCERATIVE PANCOLITIS WITHOUT COMPLICATION: ICD-10-CM

## 2023-04-04 DIAGNOSIS — G95.20 CORD COMPRESSION MYELOPATHY: ICD-10-CM

## 2023-04-04 DIAGNOSIS — M46.99 UNSPECIFIED INFLAMMATORY SPONDYLOPATHY, MULTIPLE SITES IN SPINE: ICD-10-CM

## 2023-04-04 DIAGNOSIS — Z00.00 ENCOUNTER FOR PREVENTIVE HEALTH EXAMINATION: Primary | ICD-10-CM

## 2023-04-04 DIAGNOSIS — I65.23 BILATERAL CAROTID ARTERY STENOSIS: ICD-10-CM

## 2023-04-04 DIAGNOSIS — J45.20 MILD INTERMITTENT REACTIVE AIRWAY DISEASE WITHOUT COMPLICATION: ICD-10-CM

## 2023-04-04 DIAGNOSIS — M85.80 OSTEOPENIA, UNSPECIFIED LOCATION: ICD-10-CM

## 2023-04-04 PROCEDURE — 3077F PR MOST RECENT SYSTOLIC BLOOD PRESSURE >= 140 MM HG: ICD-10-PCS | Mod: CPTII,S$GLB,, | Performed by: NURSE PRACTITIONER

## 2023-04-04 PROCEDURE — 1160F PR REVIEW ALL MEDS BY PRESCRIBER/CLIN PHARMACIST DOCUMENTED: ICD-10-PCS | Mod: CPTII,S$GLB,, | Performed by: NURSE PRACTITIONER

## 2023-04-04 PROCEDURE — 1100F PTFALLS ASSESS-DOCD GE2>/YR: CPT | Mod: CPTII,S$GLB,, | Performed by: NURSE PRACTITIONER

## 2023-04-04 PROCEDURE — 3077F SYST BP >= 140 MM HG: CPT | Mod: CPTII,S$GLB,, | Performed by: NURSE PRACTITIONER

## 2023-04-04 PROCEDURE — 3079F DIAST BP 80-89 MM HG: CPT | Mod: CPTII,S$GLB,, | Performed by: NURSE PRACTITIONER

## 2023-04-04 PROCEDURE — 3079F PR MOST RECENT DIASTOLIC BLOOD PRESSURE 80-89 MM HG: ICD-10-PCS | Mod: CPTII,S$GLB,, | Performed by: NURSE PRACTITIONER

## 2023-04-04 PROCEDURE — G0439 PR MEDICARE ANNUAL WELLNESS SUBSEQUENT VISIT: ICD-10-PCS | Mod: S$GLB,,, | Performed by: NURSE PRACTITIONER

## 2023-04-04 PROCEDURE — G0439 PPPS, SUBSEQ VISIT: HCPCS | Mod: S$GLB,,, | Performed by: NURSE PRACTITIONER

## 2023-04-04 PROCEDURE — 1160F RVW MEDS BY RX/DR IN RCRD: CPT | Mod: CPTII,S$GLB,, | Performed by: NURSE PRACTITIONER

## 2023-04-04 PROCEDURE — 1157F PR ADVANCE CARE PLAN OR EQUIV PRESENT IN MEDICAL RECORD: ICD-10-PCS | Mod: CPTII,S$GLB,, | Performed by: NURSE PRACTITIONER

## 2023-04-04 PROCEDURE — 1159F MED LIST DOCD IN RCRD: CPT | Mod: CPTII,S$GLB,, | Performed by: NURSE PRACTITIONER

## 2023-04-04 PROCEDURE — 1159F PR MEDICATION LIST DOCUMENTED IN MEDICAL RECORD: ICD-10-PCS | Mod: CPTII,S$GLB,, | Performed by: NURSE PRACTITIONER

## 2023-04-04 PROCEDURE — 1157F ADVNC CARE PLAN IN RCRD: CPT | Mod: CPTII,S$GLB,, | Performed by: NURSE PRACTITIONER

## 2023-04-04 PROCEDURE — 1100F PR PT FALLS ASSESS DOC 2+ FALLS/FALL W/INJURY/YR: ICD-10-PCS | Mod: CPTII,S$GLB,, | Performed by: NURSE PRACTITIONER

## 2023-04-04 PROCEDURE — 3288F FALL RISK ASSESSMENT DOCD: CPT | Mod: CPTII,S$GLB,, | Performed by: NURSE PRACTITIONER

## 2023-04-04 PROCEDURE — 3288F PR FALLS RISK ASSESSMENT DOCUMENTED: ICD-10-PCS | Mod: CPTII,S$GLB,, | Performed by: NURSE PRACTITIONER

## 2023-04-08 NOTE — PATIENT INSTRUCTIONS
Counseling and Referral of Other Preventative  (Italic type indicates deductible and co-insurance are waived)    Patient Name: Zara Ann  Today's Date: 4/8/2023    Health Maintenance       Date Due Completion Date    Shingles Vaccine (2 of 2) 08/31/2021 7/6/2021    COVID-19 Vaccine (4 - Booster for Pfizer series) 01/28/2022 12/3/2021    DEXA Scan 06/16/2025 6/16/2022    Override on 5/4/2015: Done (media tab)    Override on 4/29/2013: Done ([place]. T score -2.5 spine, and -0.9 femur)    Lipid Panel 04/06/2026 4/6/2021    Override on 6/4/2014: Done    TETANUS VACCINE 10/28/2029 10/28/2019        No orders of the defined types were placed in this encounter.    The following information is provided to all patients.  This information is to help you find resources for any of the problems found today that may be affecting your health:                Living healthy guide: www.[facility].[place].gov      Understanding Diabetes: www.diabetes.org      Eating healthy: www.cdc.gov/healthyweight      CDC home safety checklist: www.cdc.gov/steadi/patient.html      Agency on Aging: www.goea.[place].gov      Alcoholics anonymous (AA): www.aa.org      Physical Activity: www.shawn.nih.gov/rg1mizd      Tobacco use: www.quitwithusla.org

## 2023-04-08 NOTE — PROGRESS NOTES
"  Zara Ann presented for a  Medicare AWV and comprehensive Health Risk Assessment today. The following components were reviewed and updated:    Medical history  Family History  Social history  Allergies and Current Medications  Health Risk Assessment  Health Maintenance  Care Team         ** See Completed Assessments for Annual Wellness Visit within the encounter summary.**         The following assessments were completed:  Living Situation  CAGE  Depression Screening  Timed Get Up and Go  Whisper Test  Cognitive Function Screening  Nutrition Screening  ADL Screening  PAQ Screening        Vitals:    04/04/23 1152   BP: (!) 144/81   Pulse: 81   Weight: 64.9 kg (143 lb)   Height: 5' 2" (1.575 m)     Body mass index is 26.16 kg/m².  Physical Exam  Constitutional:       Appearance: Normal appearance.   HENT:      Head: Normocephalic and atraumatic.      Nose: Nose normal.   Eyes:      Extraocular Movements: Extraocular movements intact.      Pupils: Pupils are equal, round, and reactive to light.   Cardiovascular:      Rate and Rhythm: Normal rate.      Pulses: Normal pulses.   Pulmonary:      Effort: Pulmonary effort is normal.   Musculoskeletal:      Cervical back: Normal range of motion and neck supple.   Neurological:      General: No focal deficit present.      Mental Status: She is alert and oriented to person, place, and time.             Diagnoses and health risks identified today and associated recommendations/orders:    1. Encounter for preventive health examination  Awv completed    2. Ulcerative pancolitis without complication  Chronic and stable. Continue current treatment. Follow with PCP.      3. Unspecified inflammatory spondylopathy, multiple sites in spine  Chronic and stable. Continue current treatment. Follow with PCP.      4. Nonthrombocytopenic purpura  Chronic and stable. Continue current treatment. Follow with PCP.      5. Recurrent major depressive disorder, in remission  Chronic and " stable. Continue current treatment. Follow with PCP.  On zoloft      6. Cord compression myelopathy  Chronic and stable. Continue current treatment. Follow with PCP.      7. Idiopathic peripheral neuropathy  Chronic and stable. Continue current treatment. Follow with PCP.      8. Bilateral carotid artery stenosis  Chronic and stable. Continue current treatment. Follow with PCP.      9. Gastroesophageal reflux disease, unspecified whether esophagitis present  Chronic and stable. Continue current treatment. Follow with PCP.      10. Osteopenia, unspecified location  Chronic and stable. Continue current treatment. Follow with PCP.      11. Mild intermittent reactive airway disease without complication  Chronic and stable. Continue current treatment. Follow with PCP.        Provided Zara with a 5-10 year written screening schedule and personal prevention plan. Recommendations were developed using the USPSTF age appropriate recommendations. Education, counseling, and referrals were provided as needed. After Visit Summary printed and given to patient which includes a list of additional screenings\tests needed.    Fu in 1 yr for LEONARDO Rogers NP  I offered to discuss advanced care planning, including how to pick a person who would make decisions for you if you were unable to make them for yourself, called a health care power of , and what kind of decisions you might make such as use of life sustaining treatments such as ventilators and tube feeding when faced with a life limiting illness recorded on a living will that they will need to know. (How you want to be cared for as you near the end of your natural life)     X  Patient has advanced directives on file, which we reviewed, and they do not wish to make changes.

## 2023-04-14 ENCOUNTER — PATIENT MESSAGE (OUTPATIENT)
Dept: FAMILY MEDICINE | Facility: CLINIC | Age: 78
End: 2023-04-14
Payer: MEDICARE

## 2023-06-30 RX ORDER — SERTRALINE HYDROCHLORIDE 100 MG/1
TABLET, FILM COATED ORAL
Qty: 180 TABLET | Refills: 1 | Status: SHIPPED | OUTPATIENT
Start: 2023-06-30 | End: 2024-02-02

## 2023-06-30 NOTE — TELEPHONE ENCOUNTER
Care Due:                  Date            Visit Type   Department     Provider  --------------------------------------------------------------------------------                                EP -                              PRIMARY      [facility] FAMILY  Last Visit: 08-      CARE ([facility])   MEDICINE       Ashlie Adam  Next Visit: None Scheduled  None         None Found                                                            Last  Test          Frequency    Reason                     Performed    Due Date  --------------------------------------------------------------------------------    Office Visit  12 months..  sertraline...............  08- 08-    Health [facility] Embedded Care Due Messages. Reference number: [id number].   6/30/2023 11:26:30 AM CDT

## 2023-06-30 NOTE — TELEPHONE ENCOUNTER
Refill Routing Note   Medication(s) are not appropriate for processing by [facility] for the following reason(s):      Drug-disease interaction    [facility] action(s):  Defer Appointment due   Medication Therapy Plan: sertraline and Fatty liver      Appointments  past 12m or future 3m with PCP    Date Provider   Last Visit   8/22/2022 Ashlie Adam MD   Next Visit   Visit date not found Ashlie Adam MD   ED visits in past 90 days: 0        Note composed:11:30 AM 06/30/2023

## 2023-12-05 ENCOUNTER — TELEPHONE (OUTPATIENT)
Dept: FAMILY MEDICINE | Facility: CLINIC | Age: 78
End: 2023-12-05
Payer: MEDICARE

## 2023-12-06 ENCOUNTER — OFFICE VISIT (OUTPATIENT)
Dept: FAMILY MEDICINE | Facility: CLINIC | Age: 78
End: 2023-12-06
Payer: MEDICARE

## 2023-12-06 ENCOUNTER — HOSPITAL ENCOUNTER (OUTPATIENT)
Dept: RADIOLOGY | Facility: HOSPITAL | Age: 78
Discharge: HOME | End: 2023-12-06
Payer: MEDICARE

## 2023-12-06 ENCOUNTER — TELEPHONE (OUTPATIENT)
Dept: FAMILY MEDICINE | Facility: CLINIC | Age: 78
End: 2023-12-06

## 2023-12-06 VITALS
SYSTOLIC BLOOD PRESSURE: 140 MMHG | BODY MASS INDEX: 25.97 KG/M2 | WEIGHT: 141.13 LBS | HEIGHT: 62 IN | HEART RATE: 64 BPM | DIASTOLIC BLOOD PRESSURE: 78 MMHG | OXYGEN SATURATION: 97 %
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
## 2023-12-06 DIAGNOSIS — S89.92XA INJURY OF LEFT KNEE, INITIAL ENCOUNTER: ICD-10-CM

## 2023-12-06 DIAGNOSIS — S09.90XA HEAD TRAUMA, INITIAL ENCOUNTER: Primary | ICD-10-CM

## 2023-12-06 DIAGNOSIS — S09.90XA HEAD TRAUMA, INITIAL ENCOUNTER: ICD-10-CM

## 2023-12-06 DIAGNOSIS — R07.89 CHEST WALL PAIN: ICD-10-CM
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
## 2023-12-06 DIAGNOSIS — R41.3 OTHER AMNESIA: ICD-10-CM

## 2023-12-06 DIAGNOSIS — S06.0X0A CONCUSSION WITHOUT LOSS OF CONSCIOUSNESS, INITIAL ENCOUNTER: ICD-10-CM

## 2023-12-06 PROCEDURE — 71100 XR RIBS 2 VIEW LEFT: ICD-10-PCS | Mod: 26,LT,, | Performed by: RADIOLOGY

## 2023-12-06 PROCEDURE — 3077F SYST BP >= 140 MM HG: CPT | Mod: CPTII,S$GLB,,

## 2023-12-06 PROCEDURE — 1125F PR PAIN SEVERITY QUANTIFIED, PAIN PRESENT: ICD-10-PCS | Mod: CPTII,S$GLB,,

## 2023-12-06 PROCEDURE — 70450 CT HEAD/BRAIN W/O DYE: CPT | Mod: 26,,, | Performed by: RADIOLOGY

## 2023-12-06 PROCEDURE — 3288F FALL RISK ASSESSMENT DOCD: CPT | Mod: CPTII,S$GLB,,

## 2023-12-06 PROCEDURE — 99214 OFFICE O/P EST MOD 30 MIN: CPT | Mod: S$GLB,,,

## 2023-12-06 PROCEDURE — 70450 CT HEAD/BRAIN W/O DYE: CPT | Mod: TC,PO

## 2023-12-06 PROCEDURE — 1101F PR PT FALLS ASSESS DOC 0-1 FALLS W/OUT INJ PAST YR: ICD-10-PCS | Mod: CPTII,S$GLB,,

## 2023-12-06 PROCEDURE — 1157F ADVNC CARE PLAN IN RCRD: CPT | Mod: CPTII,S$GLB,,

## 2023-12-06 PROCEDURE — 73562 X-RAY EXAM OF KNEE 3: CPT | Mod: 26,LT,, | Performed by: RADIOLOGY

## 2023-12-06 PROCEDURE — 73560 X-RAY EXAM OF KNEE 1 OR 2: CPT | Mod: 26,59,RT, | Performed by: RADIOLOGY

## 2023-12-06 PROCEDURE — 71100 X-RAY EXAM RIBS UNI 2 VIEWS: CPT | Mod: TC,FY,PO,LT

## 2023-12-06 PROCEDURE — 1125F AMNT PAIN NOTED PAIN PRSNT: CPT | Mod: CPTII,S$GLB,,

## 2023-12-06 PROCEDURE — 73562 XR KNEE ORTHO LEFT: ICD-10-PCS | Mod: 26,LT,, | Performed by: RADIOLOGY

## 2023-12-06 PROCEDURE — 99999 PR PBB SHADOW E&M-EST. PATIENT-LVL IV: ICD-10-PCS | Mod: PBBFAC,,,

## 2023-12-06 PROCEDURE — 73560 XR KNEE ORTHO LEFT: ICD-10-PCS | Mod: 26,59,RT, | Performed by: RADIOLOGY

## 2023-12-06 PROCEDURE — 99214 PR OFFICE/OUTPT VISIT, EST, LEVL IV, 30-39 MIN: ICD-10-PCS | Mod: S$GLB,,,

## 2023-12-06 PROCEDURE — 1159F MED LIST DOCD IN RCRD: CPT | Mod: CPTII,S$GLB,,

## 2023-12-06 PROCEDURE — 3078F PR MOST RECENT DIASTOLIC BLOOD PRESSURE < 80 MM HG: ICD-10-PCS | Mod: CPTII,S$GLB,,

## 2023-12-06 PROCEDURE — 99999 PR PBB SHADOW E&M-EST. PATIENT-LVL IV: CPT | Mod: PBBFAC,,,

## 2023-12-06 PROCEDURE — 3288F PR FALLS RISK ASSESSMENT DOCUMENTED: ICD-10-PCS | Mod: CPTII,S$GLB,,

## 2023-12-06 PROCEDURE — 1157F PR ADVANCE CARE PLAN OR EQUIV PRESENT IN MEDICAL RECORD: ICD-10-PCS | Mod: CPTII,S$GLB,,

## 2023-12-06 PROCEDURE — 1101F PT FALLS ASSESS-DOCD LE1/YR: CPT | Mod: CPTII,S$GLB,,

## 2023-12-06 PROCEDURE — 73560 X-RAY EXAM OF KNEE 1 OR 2: CPT | Mod: 59,TC,FY,PO,RT

## 2023-12-06 PROCEDURE — 3077F PR MOST RECENT SYSTOLIC BLOOD PRESSURE >= 140 MM HG: ICD-10-PCS | Mod: CPTII,S$GLB,,

## 2023-12-06 PROCEDURE — 73562 X-RAY EXAM OF KNEE 3: CPT | Mod: TC,FY,PO,LT

## 2023-12-06 PROCEDURE — 71100 X-RAY EXAM RIBS UNI 2 VIEWS: CPT | Mod: 26,LT,, | Performed by: RADIOLOGY

## 2023-12-06 PROCEDURE — 70450 CT HEAD WITHOUT CONTRAST: ICD-10-PCS | Mod: 26,,, | Performed by: RADIOLOGY

## 2023-12-06 PROCEDURE — 3078F DIAST BP <80 MM HG: CPT | Mod: CPTII,S$GLB,,

## 2023-12-06 PROCEDURE — 1159F PR MEDICATION LIST DOCUMENTED IN MEDICAL RECORD: ICD-10-PCS | Mod: CPTII,S$GLB,,
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
## 2023-12-13 ENCOUNTER — OFFICE VISIT (OUTPATIENT)
Dept: FAMILY MEDICINE | Facility: CLINIC | Age: 78
End: 2023-12-13
Payer: MEDICARE

## 2023-12-13 VITALS
WEIGHT: 140.75 LBS | SYSTOLIC BLOOD PRESSURE: 138 MMHG | HEART RATE: 66 BPM | OXYGEN SATURATION: 98 % | BODY MASS INDEX: 25.9 KG/M2 | DIASTOLIC BLOOD PRESSURE: 80 MMHG | HEIGHT: 62 IN

## 2023-12-13 DIAGNOSIS — F07.81 POST CONCUSSIVE SYNDROME: Primary | ICD-10-CM

## 2023-12-13 DIAGNOSIS — Z79.899 DRUG THERAPY: ICD-10-CM

## 2023-12-13 DIAGNOSIS — K76.0 FATTY LIVER: ICD-10-CM

## 2023-12-13 DIAGNOSIS — I65.23 BILATERAL CAROTID ARTERY STENOSIS: ICD-10-CM

## 2023-12-13 PROCEDURE — 1159F PR MEDICATION LIST DOCUMENTED IN MEDICAL RECORD: ICD-10-PCS | Mod: CPTII,S$GLB,,

## 2023-12-13 PROCEDURE — 1159F MED LIST DOCD IN RCRD: CPT | Mod: CPTII,S$GLB,,

## 2023-12-13 PROCEDURE — 1157F PR ADVANCE CARE PLAN OR EQUIV PRESENT IN MEDICAL RECORD: ICD-10-PCS | Mod: CPTII,S$GLB,,

## 2023-12-13 PROCEDURE — 3075F PR MOST RECENT SYSTOLIC BLOOD PRESS GE 130-139MM HG: ICD-10-PCS | Mod: CPTII,S$GLB,,

## 2023-12-13 PROCEDURE — 99214 PR OFFICE/OUTPT VISIT, EST, LEVL IV, 30-39 MIN: ICD-10-PCS | Mod: S$GLB,,,

## 2023-12-13 PROCEDURE — 1101F PR PT FALLS ASSESS DOC 0-1 FALLS W/OUT INJ PAST YR: ICD-10-PCS | Mod: CPTII,S$GLB,,

## 2023-12-13 PROCEDURE — 1126F PR PAIN SEVERITY QUANTIFIED, NO PAIN PRESENT: ICD-10-PCS | Mod: CPTII,S$GLB,,

## 2023-12-13 PROCEDURE — 99214 OFFICE O/P EST MOD 30 MIN: CPT | Mod: S$GLB,,,

## 2023-12-13 PROCEDURE — 1157F ADVNC CARE PLAN IN RCRD: CPT | Mod: CPTII,S$GLB,,

## 2023-12-13 PROCEDURE — 99999 PR PBB SHADOW E&M-EST. PATIENT-LVL IV: CPT | Mod: PBBFAC,,,

## 2023-12-13 PROCEDURE — 3288F FALL RISK ASSESSMENT DOCD: CPT | Mod: CPTII,S$GLB,,

## 2023-12-13 PROCEDURE — 3288F PR FALLS RISK ASSESSMENT DOCUMENTED: ICD-10-PCS | Mod: CPTII,S$GLB,,

## 2023-12-13 PROCEDURE — 1126F AMNT PAIN NOTED NONE PRSNT: CPT | Mod: CPTII,S$GLB,,

## 2023-12-13 PROCEDURE — 99999 PR PBB SHADOW E&M-EST. PATIENT-LVL IV: ICD-10-PCS | Mod: PBBFAC,,,

## 2023-12-13 PROCEDURE — 1101F PT FALLS ASSESS-DOCD LE1/YR: CPT | Mod: CPTII,S$GLB,,

## 2023-12-13 PROCEDURE — 3075F SYST BP GE 130 - 139MM HG: CPT | Mod: CPTII,S$GLB,,

## 2023-12-13 PROCEDURE — 3079F PR MOST RECENT DIASTOLIC BLOOD PRESSURE 80-89 MM HG: ICD-10-PCS | Mod: CPTII,S$GLB,,

## 2023-12-13 PROCEDURE — 3079F DIAST BP 80-89 MM HG: CPT | Mod: CPTII,S$GLB,,
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
## 2023-12-16 DIAGNOSIS — B02.31 HERPES ZOSTER CONJUNCTIVITIS: ICD-10-CM

## 2023-12-16 NOTE — TELEPHONE ENCOUNTER
Care Due:                  Date            Visit Type   Department     Provider  --------------------------------------------------------------------------------                                EP -                              PRIMARY      [facility]  Last Visit: 08-      CARE ([facility])   MEDICINE       Ashlie Adam  Next Visit: None Scheduled  None         None Found                                                            Last  Test          Frequency    Reason                     Performed    Due Date  --------------------------------------------------------------------------------    Office Visit  15 months..  sertraline...............  08-   11-    [facility] Embedded Care Due Messages. Reference number: [id number].   12/16/2023 2:09:13 PM CST

## 2023-12-18 RX ORDER — VALACYCLOVIR HYDROCHLORIDE 1 G/1
TABLET, FILM COATED ORAL
Qty: 10 TABLET | Refills: 1 | Status: SHIPPED | OUTPATIENT
Start: 2023-12-18 | End: 2024-02-09 | Stop reason: SDUPTHER

## 2024-01-09 ENCOUNTER — TELEPHONE (OUTPATIENT)
Dept: FAMILY MEDICINE | Facility: CLINIC | Age: 79
End: 2024-01-09
Payer: MEDICARE

## 2024-01-09 NOTE — TELEPHONE ENCOUNTER
Called pt about the appointment she was asking about being cx, but no answer and no vm to leave message. - America Romero [facility]

## 2024-01-09 NOTE — TELEPHONE ENCOUNTER
2nd phone call about scheduling appointment she saw Angelita on 12/13/23. She asked for her January appointment to be cancelled now trying to help her reschedule unable to get hold of patient on the phone tried twice so  far no vm is set up.- America Romero CCM
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
## 2024-01-10 NOTE — TELEPHONE ENCOUNTER
----- Message from Inez Valiente sent at 1/9/2024  4:20 PM CST -----  Regarding: pt called  Name of Who is Calling: MIKY BLANDON [[id number]]      What is the request in detail: requesting a call back about appt that was cxl. Pt stated that she was original scheduled  for 01/09 for an annual and she is upset . Requesting to see her physician only. Please advise       Can the clinic reply by [facility]: No       What Number to Call Back if not in [facility]: Telephone Information:          [phone]

## 2024-02-01 ENCOUNTER — TELEPHONE (OUTPATIENT)
Dept: FAMILY MEDICINE | Facility: CLINIC | Age: 79
End: 2024-02-01
Payer: MEDICARE

## 2024-02-02 NOTE — TELEPHONE ENCOUNTER
----- Message from Keith Leos sent at 2/1/2024  4:16 PM CST -----  Contact: phi  Type: Needs Medical Advice  Who Called:  [facility]  Best Call Back Number:   [facility] [address] [place] [address]  [address]  [place] [address]  Phone: [phone] Fax: [phone]      Additional Information: [facility] is calling from [facility] requesting a refill on sertraline (ZOLOFT) 100 MG tablet for pt.Please call backa nd advise.

## 2024-02-02 NOTE — TELEPHONE ENCOUNTER
Being addressed in refill encounter. Pt aware she will be notified when medication sent to pharmacy.

## 2024-02-09 ENCOUNTER — OFFICE VISIT (OUTPATIENT)
Dept: FAMILY MEDICINE | Facility: CLINIC | Age: 79
End: 2024-02-09
Payer: MEDICARE

## 2024-02-09 ENCOUNTER — LAB VISIT (OUTPATIENT)
Dept: LAB | Facility: HOSPITAL | Age: 79
End: 2024-02-09
Attending: FAMILY MEDICINE
Payer: MEDICARE

## 2024-02-09 VITALS
BODY MASS INDEX: 25.44 KG/M2 | WEIGHT: 138.25 LBS | SYSTOLIC BLOOD PRESSURE: 120 MMHG | HEIGHT: 62 IN | OXYGEN SATURATION: 97 % | HEART RATE: 70 BPM | DIASTOLIC BLOOD PRESSURE: 60 MMHG

## 2024-02-09 DIAGNOSIS — N81.6 RECTOCELE: ICD-10-CM

## 2024-02-09 DIAGNOSIS — G95.20 CORD COMPRESSION MYELOPATHY: ICD-10-CM

## 2024-02-09 DIAGNOSIS — B02.31 HERPES ZOSTER CONJUNCTIVITIS: ICD-10-CM

## 2024-02-09 DIAGNOSIS — F33.1 MAJOR DEPRESSIVE DISORDER, RECURRENT, MODERATE: ICD-10-CM

## 2024-02-09 DIAGNOSIS — Z79.899 DRUG THERAPY: ICD-10-CM

## 2024-02-09 DIAGNOSIS — F33.40 RECURRENT MAJOR DEPRESSIVE DISORDER, IN REMISSION: ICD-10-CM

## 2024-02-09 DIAGNOSIS — R26.89 BALANCE PROBLEM: ICD-10-CM

## 2024-02-09 DIAGNOSIS — K51.00 ULCERATIVE PANCOLITIS WITHOUT COMPLICATION: ICD-10-CM

## 2024-02-09 DIAGNOSIS — D69.2 NONTHROMBOCYTOPENIC PURPURA: ICD-10-CM

## 2024-02-09 DIAGNOSIS — F07.81 POST CONCUSSIVE SYNDROME: ICD-10-CM

## 2024-02-09 DIAGNOSIS — R39.9 URINARY SYMPTOM OR SIGN: Primary | ICD-10-CM

## 2024-02-09 DIAGNOSIS — Z00.00 ROUTINE GENERAL MEDICAL EXAMINATION AT A HEALTH CARE FACILITY: Primary | ICD-10-CM

## 2024-02-09 DIAGNOSIS — M46.99 UNSPECIFIED INFLAMMATORY SPONDYLOPATHY, MULTIPLE SITES IN SPINE: ICD-10-CM

## 2024-02-09 DIAGNOSIS — I65.23 BILATERAL CAROTID ARTERY STENOSIS: ICD-10-CM

## 2024-02-09 LAB
ALBUMIN SERPL BCP-MCNC: 3.9 G/DL (ref 3.5–5.2)
ALP SERPL-CCNC: 90 U/L (ref 55–135)
ALT SERPL W/O P-5'-P-CCNC: 15 U/L (ref 10–44)
ANION GAP SERPL CALC-SCNC: 12 MMOL/L (ref 8–16)
AST SERPL-CCNC: 24 U/L (ref 10–40)
BASOPHILS # BLD AUTO: 0.03 K/UL (ref 0–0.2)
BASOPHILS NFR BLD: 0.4 % (ref 0–1.9)
BILIRUB SERPL-MCNC: 1.2 MG/DL (ref 0.1–1)
BUN SERPL-MCNC: 22 MG/DL (ref 8–23)
CALCIUM SERPL-MCNC: 9.9 MG/DL (ref 8.7–10.5)
CHLORIDE SERPL-SCNC: 108 MMOL/L (ref 95–110)
CHOLEST SERPL-MCNC: 184 MG/DL (ref 120–199)
CHOLEST/HDLC SERPL: 2.9 {RATIO} (ref 2–5)
CO2 SERPL-SCNC: 22 MMOL/L (ref 23–29)
CREAT SERPL-MCNC: 0.7 MG/DL (ref 0.5–1.4)
DIFFERENTIAL METHOD BLD: ABNORMAL
EOSINOPHIL # BLD AUTO: 0.5 K/UL (ref 0–0.5)
EOSINOPHIL NFR BLD: 7 % (ref 0–8)
ERYTHROCYTE [DISTWIDTH] IN BLOOD BY AUTOMATED COUNT: 13.9 % (ref 11.5–14.5)
EST. GFR  (NO RACE VARIABLE): >60 ML/MIN/1.73 M^2
ESTIMATED AVG GLUCOSE: 82 MG/DL (ref 68–131)
GLUCOSE SERPL-MCNC: 103 MG/DL (ref 70–110)
HBA1C MFR BLD: 4.5 % (ref 4–5.6)
HCT VFR BLD AUTO: 40.5 % (ref 37–48.5)
HDLC SERPL-MCNC: 63 MG/DL (ref 40–75)
HDLC SERPL: 34.2 % (ref 20–50)
HGB BLD-MCNC: 13.6 G/DL (ref 12–16)
IMM GRANULOCYTES # BLD AUTO: 0.02 K/UL (ref 0–0.04)
IMM GRANULOCYTES NFR BLD AUTO: 0.3 % (ref 0–0.5)
LDLC SERPL CALC-MCNC: 104.8 MG/DL (ref 63–159)
LYMPHOCYTES # BLD AUTO: 0.7 K/UL (ref 1–4.8)
LYMPHOCYTES NFR BLD: 10 % (ref 18–48)
MCH RBC QN AUTO: 31.2 PG (ref 27–31)
MCHC RBC AUTO-ENTMCNC: 33.6 G/DL (ref 32–36)
MCV RBC AUTO: 93 FL (ref 82–98)
MONOCYTES # BLD AUTO: 0.5 K/UL (ref 0.3–1)
MONOCYTES NFR BLD: 6.3 % (ref 4–15)
NEUTROPHILS # BLD AUTO: 5.6 K/UL (ref 1.8–7.7)
NEUTROPHILS NFR BLD: 76 % (ref 38–73)
NONHDLC SERPL-MCNC: 121 MG/DL
NRBC BLD-RTO: 0 /100 WBC
PLATELET # BLD AUTO: 125 K/UL (ref 150–450)
PMV BLD AUTO: 9.4 FL (ref 9.2–12.9)
POTASSIUM SERPL-SCNC: 3.7 MMOL/L (ref 3.5–5.1)
PROT SERPL-MCNC: 7.4 G/DL (ref 6–8.4)
RBC # BLD AUTO: 4.36 M/UL (ref 4–5.4)
SODIUM SERPL-SCNC: 142 MMOL/L (ref 136–145)
TRIGL SERPL-MCNC: 81 MG/DL (ref 30–150)
TSH SERPL DL<=0.005 MIU/L-ACNC: 1.34 UIU/ML (ref 0.4–4)
WBC # BLD AUTO: 7.42 K/UL (ref 3.9–12.7)

## 2024-02-09 PROCEDURE — 84443 ASSAY THYROID STIM HORMONE: CPT

## 2024-02-09 PROCEDURE — 80061 LIPID PANEL: CPT

## 2024-02-09 PROCEDURE — 36415 COLL VENOUS BLD VENIPUNCTURE: CPT | Mod: PN

## 2024-02-09 PROCEDURE — 99999 PR PBB SHADOW E&M-EST. PATIENT-LVL IV: CPT | Mod: PBBFAC,,, | Performed by: FAMILY MEDICINE

## 2024-02-09 PROCEDURE — 99397 PER PM REEVAL EST PAT 65+ YR: CPT | Mod: S$GLB,,, | Performed by: FAMILY MEDICINE

## 2024-02-09 PROCEDURE — 83036 HEMOGLOBIN GLYCOSYLATED A1C: CPT

## 2024-02-09 PROCEDURE — 99213 OFFICE O/P EST LOW 20 MIN: CPT | Mod: 25,S$GLB,, | Performed by: FAMILY MEDICINE

## 2024-02-09 PROCEDURE — 80053 COMPREHEN METABOLIC PANEL: CPT

## 2024-02-09 PROCEDURE — 85025 COMPLETE CBC W/AUTO DIFF WBC: CPT

## 2024-02-09 RX ORDER — ACYCLOVIR 50 MG/G
OINTMENT TOPICAL
Qty: 30 G | Refills: 1 | Status: SHIPPED | OUTPATIENT
Start: 2024-02-09

## 2024-02-09 RX ORDER — ARIPIPRAZOLE 2 MG/1
2 TABLET ORAL DAILY
Qty: 30 TABLET | Refills: 2 | Status: SHIPPED | OUTPATIENT
Start: 2024-02-09 | End: 2024-05-21

## 2024-02-09 RX ORDER — VALACYCLOVIR HYDROCHLORIDE 1 G/1
TABLET, FILM COATED ORAL
Qty: 30 TABLET | Refills: 1 | Status: SHIPPED | OUTPATIENT
Start: 2024-02-09
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
## 2024-03-22 ENCOUNTER — OFFICE VISIT (OUTPATIENT)
Dept: FAMILY MEDICINE | Facility: CLINIC | Age: 79
End: 2024-03-22
Payer: MEDICARE

## 2024-03-22 VITALS — WEIGHT: 138.25 LBS | BODY MASS INDEX: 25.44 KG/M2 | HEIGHT: 62 IN

## 2024-03-22 DIAGNOSIS — F33.1 MAJOR DEPRESSIVE DISORDER, RECURRENT, MODERATE: Primary | ICD-10-CM

## 2024-03-22 DIAGNOSIS — D69.6 THROMBOCYTOPENIA: ICD-10-CM

## 2024-03-22 PROCEDURE — 99213 OFFICE O/P EST LOW 20 MIN: CPT | Mod: 95,,, | Performed by: FAMILY MEDICINE

## 2024-03-22 NOTE — PROGRESS NOTES
Subjective:       Patient ID: Zara Ann is a 79 y.o. female.    Chief Complaint: Follow-up      The patient location is: [place]  The chief complaint leading to consultation is: f/u  Visit type: Virtual visit with synchronous audio and video  Total time spent with patient: 10mins  Each patient to whom he or she provides medical services by telemedicine is:  (1) informed of the relationship between the physician and patient and the respective role of any other health care provider with respect to management of the patient; and (2) notified that he or she may decline to receive medical services by telemedicine and may withdraw from such care at any time.    Notes: Patient seen for f/u.   We started low-dose abilify at lov in addition to current zoloft (100mg BID). She does notice that her mood swings are better. No negative side effects.   Labs 2024 rev.     Review of Systems   Constitutional:  Negative for activity change and unexpected weight change.   HENT:  Negative for hearing loss, rhinorrhea and trouble swallowing.    Eyes:  Negative for discharge and visual disturbance.   Respiratory:  Negative for chest tightness and wheezing.    Cardiovascular:  Negative for chest pain and palpitations.   Gastrointestinal:  Negative for blood in stool, constipation, diarrhea and vomiting.   Endocrine: Negative for polydipsia and polyuria.   Genitourinary:  Negative for difficulty urinating, dysuria, hematuria and menstrual problem.   Musculoskeletal:  Negative for arthralgias, joint swelling and neck pain.   Neurological:  Positive for headaches. Negative for weakness.   Psychiatric/Behavioral:  Negative for confusion and dysphoric mood.        Objective:        Physical Exam  Vitals and nursing note reviewed.   Constitutional:       General: She is not in acute distress.     Appearance: Normal appearance. She is well-developed.   HENT:      Head: Normocephalic and atraumatic.   Eyes:      General: No scleral icterus.         Right eye: No discharge.         Left eye: No discharge.      Conjunctiva/sclera: Conjunctivae normal.   Pulmonary:      Effort: Pulmonary effort is normal. No respiratory distress.   Skin:     General: Skin is warm and dry.   Neurological:      General: No focal deficit present.      Mental Status: She is alert and oriented to person, place, and time.   Psychiatric:         Mood and Affect: Mood normal.         Behavior: Behavior normal.             Assessment:   Major depressive disorder, recurrent, moderate  Comments:  doing ok with low dose abilify in addition to zoloft, cont 2mg nightly    Thrombocytopenia  Comments:  slow dip over time, repeat lab in 4-6mos for monitoring  Orders:  -     CBC Auto Differential; Future; Expected date: 03/22/2024         Patient aware of limitations to assessment and exam of telemedicine. Deemed appropriate at this time given current covid-19 concerns.

## 2024-04-19 NOTE — TELEPHONE ENCOUNTER
No care due was identified.  Health [facility] Embedded Care Due Messages. Reference number: [id number].   4/19/2024 12:43:46 PM CDT

## 2024-04-20 RX ORDER — SERTRALINE HYDROCHLORIDE 100 MG/1
TABLET, FILM COATED ORAL
Qty: 180 TABLET | Refills: 3 | Status: SHIPPED | OUTPATIENT
Start: 2024-04-20

## 2024-04-20 NOTE — TELEPHONE ENCOUNTER
Refill Decision Note   Zaraizabel Ann  is requesting a refill authorization.  Brief Assessment and Rationale for Refill:  Approve     Medication Therapy Plan:         Comments:     Note composed:2:16 PM 04/20/2024

## 2024-04-30 DIAGNOSIS — Z00.00 ENCOUNTER FOR MEDICARE ANNUAL WELLNESS EXAM: ICD-10-CM

## 2024-05-18 DIAGNOSIS — F33.40 RECURRENT MAJOR DEPRESSIVE DISORDER, IN REMISSION: ICD-10-CM

## 2024-05-18 NOTE — TELEPHONE ENCOUNTER
No care due was identified.  Health [facility] Embedded Care Due Messages. Reference number: [id number].   5/18/2024 4:36:10 PM CDT

## 2024-05-20 NOTE — TELEPHONE ENCOUNTER
Please approve for ARIPiprazole (ABILIFY) 2 MG Tab   Last OV 03/22/24  Last refill date 02/09/24  Last labs 02/09/24  Next appt NA

## 2024-05-21 RX ORDER — ARIPIPRAZOLE 2 MG/1
2 TABLET ORAL
Qty: 30 TABLET | Refills: 3 | Status: SHIPPED | OUTPATIENT
Start: 2024-05-21

## 2024-09-19 DIAGNOSIS — F33.40 RECURRENT MAJOR DEPRESSIVE DISORDER, IN REMISSION: ICD-10-CM

## 2024-09-19 NOTE — TELEPHONE ENCOUNTER
No care due was identified.  Health [facility] Embedded Care Due Messages. Reference number: [id number].   9/19/2024 6:43:20 AM CDT

## 2024-09-20 RX ORDER — ARIPIPRAZOLE 2 MG/1
2 TABLET ORAL
Qty: 30 TABLET | Refills: 3 | Status: SHIPPED | OUTPATIENT
Start: 2024-09-20

## 2025-01-20 DIAGNOSIS — F33.40 RECURRENT MAJOR DEPRESSIVE DISORDER, IN REMISSION: ICD-10-CM

## 2025-01-20 NOTE — TELEPHONE ENCOUNTER
No care due was identified.  Health [facility] Embedded Care Due Messages. Reference number: [id number].   1/20/2025 6:42:36 AM CST

## 2025-01-21 NOTE — TELEPHONE ENCOUNTER
Refill Routing Note   Medication(s) are not appropriate for processing by [facility] for the following reason(s):        Outside of protocol    [facility] action(s):  Route               Appointments  past 12m or future 3m with PCP    Date Provider   Last Visit   3/22/2024 Ashlie Adam MD   Next Visit   Visit date not found Ashlie Adam MD   ED visits in past 90 days: 0        Note composed:1:17 PM 01/21/2025

## 2025-01-23 RX ORDER — ARIPIPRAZOLE 2 MG/1
2 TABLET ORAL
Qty: 30 TABLET | Refills: 0 | Status: SHIPPED | OUTPATIENT
Start: 2025-01-23

## 2025-02-18 DIAGNOSIS — F33.40 RECURRENT MAJOR DEPRESSIVE DISORDER, IN REMISSION: ICD-10-CM

## 2025-02-18 NOTE — TELEPHONE ENCOUNTER
No care due was identified.  [facility] Embedded Care Due Messages. Reference number: [id number].   2/18/2025 6:44:09 AM CST

## 2025-02-19 RX ORDER — ARIPIPRAZOLE 2 MG/1
2 TABLET ORAL
Qty: 30 TABLET | Refills: 3 | Status: SHIPPED | OUTPATIENT
Start: 2025-02-19

## 2025-02-19 NOTE — TELEPHONE ENCOUNTER
Refill Routing Note   Medication(s) are not appropriate for processing by [facility] for the following reason(s):        Outside of protocol    [facility] action(s):  Route             Appointments  past 12m or future 3m with PCP    Date Provider   Last Visit   3/22/2024 Ashlie Adam MD   Next Visit   Visit date not found Ashlie Adam MD   ED visits in past 90 days: 0        Note composed:8:09 PM 02/18/2025

## 2025-03-31 NOTE — PROGRESS NOTES
FINAL PATHOLOGIC DIAGNOSIS  Skin, left arm, re-excision:  -FOCAL RESIDUAL MALIGNANT MELANOMA IN SITU, COMPLETELY EXCISED  -SURGICAL MARGINS ARE NEGATIVE FOR MALIGNANT MELANOMA IN-SITU  -SCAR (POST-SURGICAL)  Diagnosed by: Fabrice Leon  (Electronically Signed: 2018-11-27 11:17:30)    CC: 73 y.o.female patient is here for suture removal.     HPI: Patient is two weeks s/p excision of a melanoma in situ on the left arm, with subsequent repair   Patient reports no problems.    EXAM:  Sutures intact.  Wound healing well.  Good approximation of skin edges.  No undue erythema to surrounding skin or signs or symptoms of infection.    Pathology: as noted above    IMPRESSION:  Healing well post excision and repair  Path as noted above    PLAN:  Site cleaned with peroxide, sutures removed  Dressed with petrolatum, Telfa and tape  Discussed pathology results  Reviewed further care and expected course  Followup to Dr. Collazo in 3-4 months; PRN to me    
Bed/Stretcher in lowest position, wheels locked, appropriate side rails in place/Call bell, personal items and telephone in reach/Instruct patient to call for assistance before getting out of bed/chair/stretcher/Non-slip footwear applied when patient is off stretcher/[place] to call system/Physically safe environment - no spills, clutter or unnecessary equipment/Purposeful proactive rounding/Room/bathroom lighting operational, light cord in reach

## 2025-04-14 PROBLEM — R29.898 WEAKNESS OF RIGHT LOWER EXTREMITY: Status: ACTIVE | Noted: 2025-04-14

## 2025-04-14 PROBLEM — M25.561 RIGHT KNEE PAIN: Status: ACTIVE | Noted: 2025-04-14

## 2025-04-14 PROBLEM — M25.661 DECREASED RANGE OF MOTION (ROM) OF RIGHT KNEE: Status: ACTIVE | Noted: 2025-04-14

## 2025-05-23 RX ORDER — SERTRALINE HYDROCHLORIDE 100 MG/1
100 TABLET, FILM COATED ORAL 2 TIMES DAILY
Qty: 180 TABLET | Refills: 2 | Status: SHIPPED | OUTPATIENT
Start: 2025-05-23

## 2025-05-23 NOTE — TELEPHONE ENCOUNTER
Refill Routing Note   Medication(s) are not appropriate for processing by [facility] for the following reason(s):        Non-participating provider    [facility] action(s):  Route               Appointments  past 12m or future 3m with PCP    Date Provider   Last Visit   3/18/2025 Mya Hdz MD   Next Visit   Visit date not found Mya Hdz MD   ED visits in past 90 days: 0        Note composed:10:36 PM 05/22/2025

## 2025-08-29 ENCOUNTER — TELEPHONE (OUTPATIENT)
Dept: FAMILY MEDICINE | Facility: CLINIC | Age: 80
End: 2025-08-29
Payer: MEDICARE

## (undated) DEVICE — SPONGE GAUZE 16PLY 4X4

## (undated) DEVICE — GOWN B1 X-LG X-LONG

## (undated) DEVICE — SEE MEDLINE ITEM 152622

## (undated) DEVICE — DRESSING N ADH OIL EMUL 3X3

## (undated) DEVICE — SPONGE SUPER KERLIX 6X6.75IN

## (undated) DEVICE — BANDAGE ESMARK LATEX FREE 4INX

## (undated) DEVICE — NDL BOX COUNTER

## (undated) DEVICE — COVER SURG LIGHT HANDLE

## (undated) DEVICE — PAD CAST SPECIALIST STRL 4

## (undated) DEVICE — SEE MEDLINE ITEM 157131

## (undated) DEVICE — SEE MEDLINE ITEM 157117

## (undated) DEVICE — APPLICATOR CHLORAPREP ORN 26ML

## (undated) DEVICE — GLOVE PROTEXIS PI CLASSIC 8.0

## (undated) DEVICE — PACK ARTHROSCOPY W/ISO BAC

## (undated) DEVICE — PENCIL ROCKER SWITCH 10FT CORD

## (undated) DEVICE — SYR EAR ULCER SGL USE 3 OZ

## (undated) DEVICE — TOURNIQUET SB QC DP 18X4IN

## (undated) DEVICE — GLOVE SURG PLYSPHRN ORTH SZ7.5

## (undated) DEVICE — SEE MEDLINE ITEM 146308

## (undated) DEVICE — GLOVE 7.5 PROTEXIS PI BLUE

## (undated) DEVICE — SLEEVE SCD EXPRESS CALF MEDIUM

## (undated) DEVICE — STRAP OR TABLE 5IN X 72IN

## (undated) DEVICE — NDL SAFETY 25G X 1.5 ECLIPSE

## (undated) DEVICE — SYS LABEL CORRECT MED

## (undated) DEVICE — DRAPE STERI-DRAPE 1000 17X11IN

## (undated) DEVICE — CORD BIPOLAR 12 FOOT

## (undated) DEVICE — SUT ETHILON 3-0 PS2 18 BLK

## (undated) DEVICE — SKINMARKER W/RULER DEVON

## (undated) DEVICE — GLOVE PROTEXIS PI CLASSIC 7.5

## (undated) DEVICE — SYR 10CC LUER LOCK

## (undated) DEVICE — SOL 9P NACL IRR PIC IL

## (undated) DEVICE — ELECTRODE REM PLYHSV RETURN 9

## (undated) DEVICE — BLADE SURG #15 CARBON STEEL

## (undated) DEVICE — CONTAINER SPECIMEN STRL 4OZ

## (undated) DEVICE — SEE MEDLINE ITEM 152487